# Patient Record
Sex: MALE | Race: OTHER | NOT HISPANIC OR LATINO | URBAN - METROPOLITAN AREA
[De-identification: names, ages, dates, MRNs, and addresses within clinical notes are randomized per-mention and may not be internally consistent; named-entity substitution may affect disease eponyms.]

---

## 2018-12-29 ENCOUNTER — INPATIENT (INPATIENT)
Facility: HOSPITAL | Age: 83
LOS: 3 days | Discharge: HOME | DRG: 392 | End: 2019-01-02
Attending: INTERNAL MEDICINE | Admitting: INTERNAL MEDICINE
Payer: COMMERCIAL

## 2018-12-29 VITALS
SYSTOLIC BLOOD PRESSURE: 124 MMHG | TEMPERATURE: 98 F | HEIGHT: 65 IN | DIASTOLIC BLOOD PRESSURE: 68 MMHG | WEIGHT: 171.96 LBS | RESPIRATION RATE: 20 BRPM | HEART RATE: 99 BPM

## 2018-12-29 DIAGNOSIS — K92.2 GASTROINTESTINAL HEMORRHAGE, UNSPECIFIED: ICD-10-CM

## 2018-12-29 DIAGNOSIS — Z29.9 ENCOUNTER FOR PROPHYLACTIC MEASURES, UNSPECIFIED: ICD-10-CM

## 2018-12-29 DIAGNOSIS — N17.9 ACUTE KIDNEY FAILURE, UNSPECIFIED: ICD-10-CM

## 2018-12-29 DIAGNOSIS — C61 MALIGNANT NEOPLASM OF PROSTATE: ICD-10-CM

## 2018-12-29 DIAGNOSIS — D64.9 ANEMIA, UNSPECIFIED: ICD-10-CM

## 2018-12-29 DIAGNOSIS — I10 ESSENTIAL (PRIMARY) HYPERTENSION: ICD-10-CM

## 2018-12-29 LAB
ALBUMIN SERPL ELPH-MCNC: 2.7 G/DL — LOW (ref 3.5–5)
ALP SERPL-CCNC: 52 U/L — SIGNIFICANT CHANGE UP (ref 40–120)
ALT FLD-CCNC: 24 U/L DA — SIGNIFICANT CHANGE UP (ref 10–60)
ANION GAP SERPL CALC-SCNC: 5 MMOL/L — SIGNIFICANT CHANGE UP (ref 5–17)
APTT BLD: 30.3 SEC — SIGNIFICANT CHANGE UP (ref 27.5–36.3)
AST SERPL-CCNC: 25 U/L — SIGNIFICANT CHANGE UP (ref 10–40)
BASOPHILS # BLD AUTO: 0.1 K/UL — SIGNIFICANT CHANGE UP (ref 0–0.2)
BASOPHILS # BLD AUTO: 0.1 K/UL — SIGNIFICANT CHANGE UP (ref 0–0.2)
BASOPHILS NFR BLD AUTO: 1.6 % — SIGNIFICANT CHANGE UP (ref 0–2)
BASOPHILS NFR BLD AUTO: 1.8 % — SIGNIFICANT CHANGE UP (ref 0–2)
BILIRUB SERPL-MCNC: 0.3 MG/DL — SIGNIFICANT CHANGE UP (ref 0.2–1.2)
BUN SERPL-MCNC: 26 MG/DL — HIGH (ref 7–18)
CALCIUM SERPL-MCNC: 7.8 MG/DL — LOW (ref 8.4–10.5)
CHLORIDE SERPL-SCNC: 108 MMOL/L — SIGNIFICANT CHANGE UP (ref 96–108)
CK MB BLD-MCNC: 0.7 % — SIGNIFICANT CHANGE UP (ref 0–3.5)
CK MB BLD-MCNC: 0.7 % — SIGNIFICANT CHANGE UP (ref 0–3.5)
CK MB CFR SERPL CALC: 3.5 NG/ML — SIGNIFICANT CHANGE UP (ref 0–3.6)
CK MB CFR SERPL CALC: 4.5 NG/ML — HIGH (ref 0–3.6)
CK SERPL-CCNC: 496 U/L — HIGH (ref 35–232)
CK SERPL-CCNC: 603 U/L — HIGH (ref 35–232)
CO2 SERPL-SCNC: 27 MMOL/L — SIGNIFICANT CHANGE UP (ref 22–31)
CREAT SERPL-MCNC: 1.41 MG/DL — HIGH (ref 0.5–1.3)
EOSINOPHIL # BLD AUTO: 0.1 K/UL — SIGNIFICANT CHANGE UP (ref 0–0.5)
EOSINOPHIL # BLD AUTO: 0.2 K/UL — SIGNIFICANT CHANGE UP (ref 0–0.5)
EOSINOPHIL NFR BLD AUTO: 1.8 % — SIGNIFICANT CHANGE UP (ref 0–6)
EOSINOPHIL NFR BLD AUTO: 3.6 % — SIGNIFICANT CHANGE UP (ref 0–6)
GLUCOSE SERPL-MCNC: 123 MG/DL — HIGH (ref 70–99)
HCT VFR BLD CALC: 27.8 % — LOW (ref 39–50)
HCT VFR BLD CALC: 29.9 % — LOW (ref 39–50)
HGB BLD-MCNC: 8.6 G/DL — LOW (ref 13–17)
HGB BLD-MCNC: 9.4 G/DL — LOW (ref 13–17)
INR BLD: 1.07 RATIO — SIGNIFICANT CHANGE UP (ref 0.88–1.16)
LYMPHOCYTES # BLD AUTO: 1.3 K/UL — SIGNIFICANT CHANGE UP (ref 1–3.3)
LYMPHOCYTES # BLD AUTO: 1.5 K/UL — SIGNIFICANT CHANGE UP (ref 1–3.3)
LYMPHOCYTES # BLD AUTO: 23.7 % — SIGNIFICANT CHANGE UP (ref 13–44)
LYMPHOCYTES # BLD AUTO: 29.5 % — SIGNIFICANT CHANGE UP (ref 13–44)
MCHC RBC-ENTMCNC: 30.8 GM/DL — LOW (ref 32–36)
MCHC RBC-ENTMCNC: 31.4 GM/DL — LOW (ref 32–36)
MCHC RBC-ENTMCNC: 31.5 PG — SIGNIFICANT CHANGE UP (ref 27–34)
MCHC RBC-ENTMCNC: 32 PG — SIGNIFICANT CHANGE UP (ref 27–34)
MCV RBC AUTO: 101.9 FL — HIGH (ref 80–100)
MCV RBC AUTO: 102.2 FL — HIGH (ref 80–100)
MONOCYTES # BLD AUTO: 0.8 K/UL — SIGNIFICANT CHANGE UP (ref 0–0.9)
MONOCYTES # BLD AUTO: 0.9 K/UL — SIGNIFICANT CHANGE UP (ref 0–0.9)
MONOCYTES NFR BLD AUTO: 15.4 % — HIGH (ref 2–14)
MONOCYTES NFR BLD AUTO: 16.3 % — HIGH (ref 2–14)
NEUTROPHILS # BLD AUTO: 2.5 K/UL — SIGNIFICANT CHANGE UP (ref 1.8–7.4)
NEUTROPHILS # BLD AUTO: 3.2 K/UL — SIGNIFICANT CHANGE UP (ref 1.8–7.4)
NEUTROPHILS NFR BLD AUTO: 49.5 % — SIGNIFICANT CHANGE UP (ref 43–77)
NEUTROPHILS NFR BLD AUTO: 56.6 % — SIGNIFICANT CHANGE UP (ref 43–77)
OB PNL STL: POSITIVE
PLATELET # BLD AUTO: 188 K/UL — SIGNIFICANT CHANGE UP (ref 150–400)
PLATELET # BLD AUTO: 188 K/UL — SIGNIFICANT CHANGE UP (ref 150–400)
POTASSIUM SERPL-MCNC: 5.2 MMOL/L — SIGNIFICANT CHANGE UP (ref 3.5–5.3)
POTASSIUM SERPL-SCNC: 5.2 MMOL/L — SIGNIFICANT CHANGE UP (ref 3.5–5.3)
PROT SERPL-MCNC: 6 G/DL — SIGNIFICANT CHANGE UP (ref 6–8.3)
PROTHROM AB SERPL-ACNC: 11.9 SEC — SIGNIFICANT CHANGE UP (ref 10–12.9)
RBC # BLD: 2.72 M/UL — LOW (ref 4.2–5.8)
RBC # BLD: 2.93 M/UL — LOW (ref 4.2–5.8)
RBC # FLD: 12.3 % — SIGNIFICANT CHANGE UP (ref 10.3–14.5)
RBC # FLD: 12.6 % — SIGNIFICANT CHANGE UP (ref 10.3–14.5)
SODIUM SERPL-SCNC: 140 MMOL/L — SIGNIFICANT CHANGE UP (ref 135–145)
TROPONIN I SERPL-MCNC: <0.015 NG/ML — SIGNIFICANT CHANGE UP (ref 0–0.04)
TROPONIN I SERPL-MCNC: <0.015 NG/ML — SIGNIFICANT CHANGE UP (ref 0–0.04)
WBC # BLD: 5.1 K/UL — SIGNIFICANT CHANGE UP (ref 3.8–10.5)
WBC # BLD: 5.6 K/UL — SIGNIFICANT CHANGE UP (ref 3.8–10.5)
WBC # FLD AUTO: 5.1 K/UL — SIGNIFICANT CHANGE UP (ref 3.8–10.5)
WBC # FLD AUTO: 5.6 K/UL — SIGNIFICANT CHANGE UP (ref 3.8–10.5)

## 2018-12-29 PROCEDURE — 99285 EMERGENCY DEPT VISIT HI MDM: CPT

## 2018-12-29 RX ORDER — PANTOPRAZOLE SODIUM 20 MG/1
40 TABLET, DELAYED RELEASE ORAL
Qty: 0 | Refills: 0 | Status: DISCONTINUED | OUTPATIENT
Start: 2018-12-29 | End: 2019-01-02

## 2018-12-29 RX ORDER — CEFTRIAXONE 500 MG/1
1 INJECTION, POWDER, FOR SOLUTION INTRAMUSCULAR; INTRAVENOUS ONCE
Qty: 0 | Refills: 0 | Status: DISCONTINUED | OUTPATIENT
Start: 2018-12-29 | End: 2018-12-29

## 2018-12-29 RX ORDER — PANTOPRAZOLE SODIUM 20 MG/1
40 TABLET, DELAYED RELEASE ORAL
Qty: 0 | Refills: 0 | Status: DISCONTINUED | OUTPATIENT
Start: 2018-12-29 | End: 2018-12-29

## 2018-12-29 RX ORDER — PANTOPRAZOLE SODIUM 20 MG/1
80 TABLET, DELAYED RELEASE ORAL ONCE
Qty: 0 | Refills: 0 | Status: COMPLETED | OUTPATIENT
Start: 2018-12-29 | End: 2018-12-29

## 2018-12-29 RX ADMIN — PANTOPRAZOLE SODIUM 40 MILLIGRAM(S): 20 TABLET, DELAYED RELEASE ORAL at 17:50

## 2018-12-29 RX ADMIN — PANTOPRAZOLE SODIUM 80 MILLIGRAM(S): 20 TABLET, DELAYED RELEASE ORAL at 12:36

## 2018-12-29 NOTE — H&P ADULT - NSHPPHYSICALEXAM_GEN_ALL_CORE
· Constitutional	Well-developed, well nourished  · Eyes	conjuctivae clear  · ENMT	No oral lesions; no gross abnormalities  · Neck	No bruits; no thyromegaly or nodules   · Back	No deformity or limitation of movement   · Respiratory	Breath Sounds equal & clear to percussion & auscultation, no accessory muscle use  · Cardiovascular	Regular rate & rhythm, normal S1, S2; no murmurs, gallops or rubs; no S3, S4  · Gastrointestinal	Soft, non-tender, no hepatosplenomegaly, normal bowel sounds  · Extremities	No cyanosis, clubbing or edema   · Neurological	Alert & oriented; no sensory, motor or coordination deficits, normal reflexes  · Musculoskeletal	No joint pain, swelling or deformity; no limitation of movement · Constitutional	Well-developed, well nourished  · Eyes	conjuctivae clear  · ENMT	No oral lesions; no gross abnormalities  · Neck	No bruits; no thyromegaly or nodules   · Back	No deformity or limitation of movement   · Respiratory	Breath Sounds equal & clear to percussion & auscultation, no accessory muscle use  · Cardiovascular	Regular rate & rhythm, normal S1, S2; no murmurs, gallops or rubs; no S3, S4  · Gastrointestinal	Soft, non-tender, no hepatosplenomegaly, normal bowel sounds  · Extremities	left leg - incision site form previous injury - clean site   · Neurological	Alert & oriented; no sensory, motor or coordination deficits, normal reflexes  · Musculoskeletal	No joint pain, swelling or deformity; no limitation of movement

## 2018-12-29 NOTE — ED ADULT NURSE NOTE - CHPI ED NUR SYMPTOMS NEG
no nausea/no dysuria/no fever/no hematuria/no abdominal distension/no burning urination/no chills/no diarrhea/no vomiting

## 2018-12-29 NOTE — H&P ADULT - PROBLEM SELECTOR PLAN 1
presented with blood BM for 2 days - 3-4 episodes   Hb level of 9.4 on admission - h/o anemia with unknown baseline  FOBT positive   maroon stool on rectal exam- no hemorrhoid noted   coags WNL, type and screen obtained, blood consent in chart   EKG- NSR @ 83 with no STT wave changes  GI Dr. Pillai presented with blood BM for 2 days - 3-4 episodes   Hb level of 9.4 on admission - h/o anemia with unknown baseline  FOBT positive   maroon stool on rectal exam- no hemorrhoid noted  orthostatic negative   coags WNL, type and screen obtained, blood consent in chart   EKG- NSR @ 83 with no STT wave changes with T1 negative   holding aspirin and  DVT prophylaxis   CBC q12  GI Dr. Pillai presented with blood BM for 2 days - 3-4 episodes   Hb level of 9.4 on admission - h/o anemia with unknown baseline  FOBT positive   maroon stool on rectal exam- no hemorrhoid noted  orthostatic negative   coags WNL, type and screen obtained, blood consent in chart   EKG- NSR @ 83 with no STT wave changes with T1 negative   holding aspirin and  DVT prophylaxis   will start on clear liquid diet as no signs of active bleeding   protonix daily   CBC q12  GI Dr. Pillai presented with blood BM for 2 days - 3-4 episodes   Hb level of 9.4 on admission - h/o anemia with unknown baseline  FOBT positive   maroon stool on rectal exam- no hemorrhoid noted  orthostatic negative   coags WNL, type and screen obtained, blood consent in chart   EKG- NSR @ 83 with no STT wave changes with T1 negative   holding aspirin and  DVT prophylaxis   will start on clear liquid diet as no signs of active bleeding   protonix daily   CBC q8  GI Dr. Pillai presented with blood BM for 2 days - 3-4 episodes   Hb level of 9.4 on admission - h/o anemia with unknown baseline  FOBT positive   maroon stool on rectal exam- no hemorrhoid noted  orthostatic negative   coags WNL, type and screen obtained, blood consent in chart   EKG- NSR @ 83 with no STT wave changes with T1 negative   holding aspirin and  DVT prophylaxis   will start on clear liquid diet as no signs of active bleeding   protonix 40 BID  CBC q8  GI Dr. Pillai presented with blood BM for 2 days - 3-4 episodes   Hb level of 9.4 on admission - h/o anemia with unknown baseline  FOBT positive   maroon stool on rectal exam- no hemorrhoid noted  orthostatic negative   coags WNL, type and screen obtained, blood consent in chart   EKG- NSR @ 83 with no STT wave changes with T1 negative   holding aspirin and  DVT prophylaxis   will start on clear liquid diet as no signs of active bleeding   protonix 40 BID  CBC q8  GI Dr. Woods

## 2018-12-29 NOTE — H&P ADULT - ASSESSMENT
89/M form home independent at baseline with PMH of prostate cancer, HTN and anemia (unknown baseline) presents to ED with bloody BM for last 2 days. Patient mentions that for last 2 dyas he had about 3-4 BM with tena blood noted with it. Denies any previous episode, abdominal pain, fever, dizziness or history of hemorrhoids in past. Denies nausea, vomiting, diarrhea, abdominal pain, SOB, chest pain, WATSON, palpitations, dizziness, headache, cough, wheezing, joint pain or swelling, fever, chills.       In ED:   vitals: 124/68, 99, 97.8, 20 (99)  labs pertinent for H/H of 9.4/29.9  coags WNL   FOBT positive 89/M form home independent at baseline with PMH of prostate cancer, HTN and anemia (unknown baseline) presents to ED with bloody BM for last 2 days. Patient mentions that for last 2 dyas he had about 3-4 BM with tena blood noted with it. Denies any previous episode, abdominal pain, fever, dizziness or history of hemorrhoids in past. Denies nausea, vomiting, diarrhea, abdominal pain, SOB, chest pain, WATSON, palpitations, dizziness, headache, cough, wheezing, joint pain or swelling, fever, chills.       Never had colonoscopy.     In ED:   vitals: 124/68, 99, 97.8, 20 (99)  labs pertinent for H/H of 9.4/29.9  coags WNL   FOBT positive

## 2018-12-29 NOTE — H&P ADULT - PROBLEM SELECTOR PLAN 2
unknown baseline   follow anemia panel, vitamin B12 and folate level   primary team to obtain records form PCP

## 2018-12-29 NOTE — H&P ADULT - PROBLEM SELECTOR PLAN 4
IMPROVE VTE Individual Risk Assessment          RISK                                                          Points  [  ] Previous VTE                                                3  [  ] Thrombophilia                                             2  [  ] Lower limb paralysis                                   2        (unable to hold up >15 seconds)    [  ] Current Cancer                                             2         (within 6 months)  [x  ] Immobilization > 24 hrs                              1  [  ] ICU/CCU stay > 24 hours                             1  [ x ] Age > 60                                                         1    IMPROVE VTE Score: 2  will hold chemical anticoagulation 2/2 concern for active bleed   SCD on lisinopril 5 mg at home   will hold in setting of bleeding and KOLBY

## 2018-12-29 NOTE — ED PROVIDER NOTE - PROGRESS NOTE DETAILS
coag normal h/h 9.4. normal plt.  no active bleeding, no abd pain  admit to med for GIB stable.  artemio fierro to see

## 2018-12-29 NOTE — ED ADULT NURSE NOTE - OBJECTIVE STATEMENT
Pt c/o dark stools sInce yesterday, denies N/V/D, denies pain, denies previous similar episodes in the past

## 2018-12-29 NOTE — H&P ADULT - NSHPLABSRESULTS_GEN_ALL_CORE
Vital Signs Last 24 Hrs  T(C): 36.6 (29 Dec 2018 10:44), Max: 36.6 (29 Dec 2018 10:44)  T(F): 97.8 (29 Dec 2018 10:44), Max: 97.8 (29 Dec 2018 10:44)  HR: 99 (29 Dec 2018 10:44) (99 - 99)  BP: 124/68 (29 Dec 2018 10:44) (124/68 - 124/68)  RR: 20 (29 Dec 2018 10:44) (20 - 20)                            9.4    5.6   )-----------( 188      ( 29 Dec 2018 12:18 )             29.9       12-29    140  |  108  |  26<H>  ----------------------------<  123<H>  5.2   |  27  |  1.41<H>    Ca    7.8<L>      29 Dec 2018 12:18    TPro  6.0  /  Alb  2.7<L>  /  TBili  0.3  /  DBili  x   /  AST  25  /  ALT  24  /  AlkPhos  52  12-29          PT/INR - ( 29 Dec 2018 12:18 )   PT: 11.9 sec;   INR: 1.07 ratio         PTT - ( 29 Dec 2018 12:18 )  PTT:30.3 sec

## 2018-12-29 NOTE — ED PROVIDER NOTE - OBJECTIVE STATEMENT
89 y/o M pt with a PMHx of Prostate Cancer and HTN and no significant PSHx presents to ED c/o dark stools x2 days. Pt states that is not on any blood thinners. Pt denies nausea, vomiting, abd pain, fevers, chills, or any other acute complaints. NKDA.

## 2018-12-29 NOTE — H&P ADULT - PROBLEM SELECTOR PLAN 5
IMPROVE VTE Individual Risk Assessment          RISK                                                          Points  [  ] Previous VTE                                                3  [  ] Thrombophilia                                             2  [  ] Lower limb paralysis                                   2        (unable to hold up >15 seconds)    [  ] Current Cancer                                             2         (within 6 months)  [x  ] Immobilization > 24 hrs                              1  [  ] ICU/CCU stay > 24 hours                             1  [ x ] Age > 60                                                         1    IMPROVE VTE Score: 2  will hold chemical anticoagulation 2/2 concern for active bleed   SCD

## 2018-12-29 NOTE — H&P ADULT - HISTORY OF PRESENT ILLNESS
89/M form home independent at baseline with PMH of prostate cancer, HTN and anemia presents to ED with bloody BM for last 2 days. Patient mentions that for last 2 dyas he had about 3-4 BM with tena blood noted with it. Denies any previous episode, abdominal pain, fever, dizziness or history of hemorrhoids in past. Denies nausea, vomiting, diarrhea, abdominal pain, SOB, chest pain, WATSON, palpitations, dizziness, headache, cough, wheezing, joint pain or swelling, fever, chills.

## 2018-12-30 LAB
ABO RH CONFIRMATION: SIGNIFICANT CHANGE UP
ANION GAP SERPL CALC-SCNC: 5 MMOL/L — SIGNIFICANT CHANGE UP (ref 5–17)
BASOPHILS # BLD AUTO: 0.1 K/UL — SIGNIFICANT CHANGE UP (ref 0–0.2)
BASOPHILS NFR BLD AUTO: 1.4 % — SIGNIFICANT CHANGE UP (ref 0–2)
BUN SERPL-MCNC: 20 MG/DL — HIGH (ref 7–18)
CALCIUM SERPL-MCNC: 7.6 MG/DL — LOW (ref 8.4–10.5)
CHLORIDE SERPL-SCNC: 111 MMOL/L — HIGH (ref 96–108)
CHOLEST SERPL-MCNC: 115 MG/DL — SIGNIFICANT CHANGE UP (ref 10–199)
CK MB BLD-MCNC: 0.7 % — SIGNIFICANT CHANGE UP (ref 0–3.5)
CK MB CFR SERPL CALC: 3.1 NG/ML — SIGNIFICANT CHANGE UP (ref 0–3.6)
CK SERPL-CCNC: 425 U/L — HIGH (ref 35–232)
CO2 SERPL-SCNC: 27 MMOL/L — SIGNIFICANT CHANGE UP (ref 22–31)
CREAT SERPL-MCNC: 1.09 MG/DL — SIGNIFICANT CHANGE UP (ref 0.5–1.3)
EOSINOPHIL # BLD AUTO: 0.2 K/UL — SIGNIFICANT CHANGE UP (ref 0–0.5)
EOSINOPHIL NFR BLD AUTO: 4.9 % — SIGNIFICANT CHANGE UP (ref 0–6)
FERRITIN SERPL-MCNC: 45 NG/ML — SIGNIFICANT CHANGE UP (ref 30–400)
FOLATE SERPL-MCNC: >20 NG/ML — SIGNIFICANT CHANGE UP
GLUCOSE SERPL-MCNC: 106 MG/DL — HIGH (ref 70–99)
HAPTOGLOB SERPL-MCNC: 105 MG/DL — SIGNIFICANT CHANGE UP (ref 34–200)
HCT VFR BLD CALC: 25.4 % — LOW (ref 39–50)
HCT VFR BLD CALC: 25.4 % — LOW (ref 39–50)
HCT VFR BLD CALC: 29.7 % — LOW (ref 39–50)
HDLC SERPL-MCNC: 39 MG/DL — LOW
HGB BLD-MCNC: 7.7 G/DL — LOW (ref 13–17)
HGB BLD-MCNC: 7.8 G/DL — LOW (ref 13–17)
HGB BLD-MCNC: 9.6 G/DL — LOW (ref 13–17)
IRON SATN MFR SERPL: 32 % — SIGNIFICANT CHANGE UP (ref 20–55)
IRON SATN MFR SERPL: 76 UG/DL — SIGNIFICANT CHANGE UP (ref 65–170)
LDH SERPL L TO P-CCNC: 253 U/L — HIGH (ref 120–225)
LIPID PNL WITH DIRECT LDL SERPL: 59 MG/DL — SIGNIFICANT CHANGE UP
LYMPHOCYTES # BLD AUTO: 1.2 K/UL — SIGNIFICANT CHANGE UP (ref 1–3.3)
LYMPHOCYTES # BLD AUTO: 26.4 % — SIGNIFICANT CHANGE UP (ref 13–44)
MAGNESIUM SERPL-MCNC: 2.2 MG/DL — SIGNIFICANT CHANGE UP (ref 1.6–2.6)
MCHC RBC-ENTMCNC: 30.5 GM/DL — LOW (ref 32–36)
MCHC RBC-ENTMCNC: 30.8 GM/DL — LOW (ref 32–36)
MCHC RBC-ENTMCNC: 30.9 PG — SIGNIFICANT CHANGE UP (ref 27–34)
MCHC RBC-ENTMCNC: 31.2 PG — SIGNIFICANT CHANGE UP (ref 27–34)
MCHC RBC-ENTMCNC: 32 PG — SIGNIFICANT CHANGE UP (ref 27–34)
MCHC RBC-ENTMCNC: 32.4 GM/DL — SIGNIFICANT CHANGE UP (ref 32–36)
MCV RBC AUTO: 100.5 FL — HIGH (ref 80–100)
MCV RBC AUTO: 102.5 FL — HIGH (ref 80–100)
MCV RBC AUTO: 98.9 FL — SIGNIFICANT CHANGE UP (ref 80–100)
MONOCYTES # BLD AUTO: 0.6 K/UL — SIGNIFICANT CHANGE UP (ref 0–0.9)
MONOCYTES NFR BLD AUTO: 13.7 % — SIGNIFICANT CHANGE UP (ref 2–14)
NEUTROPHILS # BLD AUTO: 2.4 K/UL — SIGNIFICANT CHANGE UP (ref 1.8–7.4)
NEUTROPHILS NFR BLD AUTO: 53.7 % — SIGNIFICANT CHANGE UP (ref 43–77)
PHOSPHATE SERPL-MCNC: 3.4 MG/DL — SIGNIFICANT CHANGE UP (ref 2.5–4.5)
PLATELET # BLD AUTO: 146 K/UL — LOW (ref 150–400)
PLATELET # BLD AUTO: 179 K/UL — SIGNIFICANT CHANGE UP (ref 150–400)
PLATELET # BLD AUTO: 187 K/UL — SIGNIFICANT CHANGE UP (ref 150–400)
POTASSIUM SERPL-MCNC: 5.2 MMOL/L — SIGNIFICANT CHANGE UP (ref 3.5–5.3)
POTASSIUM SERPL-SCNC: 5.2 MMOL/L — SIGNIFICANT CHANGE UP (ref 3.5–5.3)
RBC # BLD: 2.48 M/UL — LOW (ref 4.2–5.8)
RBC # BLD: 2.52 M/UL — LOW (ref 4.2–5.8)
RBC # BLD: 3.01 M/UL — LOW (ref 4.2–5.8)
RBC # FLD: 12.1 % — SIGNIFICANT CHANGE UP (ref 10.3–14.5)
RBC # FLD: 12.7 % — SIGNIFICANT CHANGE UP (ref 10.3–14.5)
RBC # FLD: 15 % — HIGH (ref 10.3–14.5)
SODIUM SERPL-SCNC: 143 MMOL/L — SIGNIFICANT CHANGE UP (ref 135–145)
TIBC SERPL-MCNC: 240 UG/DL — LOW (ref 250–450)
TOTAL CHOLESTEROL/HDL RATIO MEASUREMENT: 2.9 RATIO — LOW (ref 3.4–9.6)
TRIGL SERPL-MCNC: 83 MG/DL — SIGNIFICANT CHANGE UP (ref 10–149)
TROPONIN I SERPL-MCNC: <0.015 NG/ML — SIGNIFICANT CHANGE UP (ref 0–0.04)
TSH SERPL-MCNC: 1.21 UU/ML — SIGNIFICANT CHANGE UP (ref 0.34–4.82)
UIBC SERPL-MCNC: 164 UG/DL — SIGNIFICANT CHANGE UP (ref 110–370)
VIT B12 SERPL-MCNC: 307 PG/ML — SIGNIFICANT CHANGE UP (ref 232–1245)
WBC # BLD: 4.4 K/UL — SIGNIFICANT CHANGE UP (ref 3.8–10.5)
WBC # BLD: 5.5 K/UL — SIGNIFICANT CHANGE UP (ref 3.8–10.5)
WBC # BLD: 7.9 K/UL — SIGNIFICANT CHANGE UP (ref 3.8–10.5)
WBC # FLD AUTO: 4.4 K/UL — SIGNIFICANT CHANGE UP (ref 3.8–10.5)
WBC # FLD AUTO: 5.5 K/UL — SIGNIFICANT CHANGE UP (ref 3.8–10.5)
WBC # FLD AUTO: 7.9 K/UL — SIGNIFICANT CHANGE UP (ref 3.8–10.5)

## 2018-12-30 PROCEDURE — 74177 CT ABD & PELVIS W/CONTRAST: CPT | Mod: 26

## 2018-12-30 PROCEDURE — 99223 1ST HOSP IP/OBS HIGH 75: CPT

## 2018-12-30 RX ORDER — SOD SULF/SODIUM/NAHCO3/KCL/PEG
4000 SOLUTION, RECONSTITUTED, ORAL ORAL ONCE
Qty: 0 | Refills: 0 | Status: COMPLETED | OUTPATIENT
Start: 2018-12-30 | End: 2018-12-30

## 2018-12-30 RX ADMIN — PANTOPRAZOLE SODIUM 40 MILLIGRAM(S): 20 TABLET, DELAYED RELEASE ORAL at 17:26

## 2018-12-30 RX ADMIN — Medication 4000 MILLILITER(S): at 17:26

## 2018-12-30 RX ADMIN — PANTOPRAZOLE SODIUM 40 MILLIGRAM(S): 20 TABLET, DELAYED RELEASE ORAL at 06:07

## 2018-12-30 NOTE — CONSULT NOTE ADULT - ASSESSMENT
Continues to have rectal bleeding.   Plan for Colonoscopy tomorrow  Clears today   4 liters Gl   NPO post midnight

## 2018-12-30 NOTE — CHART NOTE - NSCHARTNOTEFT_GEN_A_CORE
Pt had total of 3 bloody bowel movments over the day and got 1 unit of prbc, post transfusion hb was 9.6, will follow cbc am

## 2018-12-30 NOTE — CONSULT NOTE ADULT - SUBJECTIVE AND OBJECTIVE BOX
Patient is a 89y old  Male who presents with a chief complaint of Dark stool for 2 days (30 Dec 2018 09:22)    HPI: 89y Male  presents with a chief complaint of         . The patient has a significant past medical history of           .     REVIEW OF SYSTEMS  Constitutional:   No fever, no fatigue, no pallor, no night sweats, no weight loss.  HEENT:   No eye pain, no vision changes, no icterus, no mouth ulcers.  Respiratory:   No shortness of breath, no cough, no respiratory distress.   Cardiovascular:   No chest pain, no palpitations.   Gastrointestinal: No abdominal pain, no nausea, no vomiting , no diahrrea, no constipation, no hematochezia,no melena.  Skin:   No rashes, no jaundice, no eczema.   Musculoskeletal:   No joint pain, no swelling, no myalgia.   Neurologic:   No headache, no seizure, no weakness.   Genitourinary:   No dysuria, no decreased urine output.  Psychiatric:  No depression, no anxiety,   Endocrine:   No thyroid disease, no diabetes.  Heme/Lymphatic:   No anemia, no blood transfusions, no lymph node enlargement, no bleeding, no bruising.  ___________________________________________________________________________________________  Allergies    No Known Allergies    Intolerances      MEDICATIONS  (STANDING):  pantoprazole  Injectable 40 milliGRAM(s) IV Push two times a day    MEDICATIONS  (PRN):      PAST MEDICAL & SURGICAL HISTORY:  Prostate cancer  Hypertension  No significant past surgical history    FAMILY HISTORY:  No pertinent family history in first degree relatives    Social History: No hsitory of : Tobacco use, IVDA, EToH  ______________________________________________________________________________________    PHYSICAL EXAM    Daily     Daily Weight in k.4 (30 Dec 2018 05:13)  BMI: 28.6 (12-29 @ 10:44)  Change in Weight:  Vital Signs Last 24 Hrs  T(C): 36.7 (30 Dec 2018 10:28), Max: 36.9 (30 Dec 2018 05:13)  T(F): 98.1 (30 Dec 2018 10:28), Max: 98.4 (30 Dec 2018 05:13)  HR: 83 (30 Dec 2018 10:) (80 - 90)  BP: 108/56 (30 Dec 2018 10:) (103/70 - 113/51)  BP(mean): --  RR: 18 (30 Dec 2018 10:28) (17 - 19)  SpO2: 100% (30 Dec 2018 10:) (98% - 100%)    General:  Well developed, well nourished, alert and active, no pallor, NAD.  HEENT:    Normal appearance of conjunctiva, ears, nose, lips, oropharynx, and oral mucosa, anicteric.  Neck:  No masses, no asymmetry.  Lymph Nodes:  No lymphadenopathy.   Cardiovascular:  RRR normal S1/S2, no murmur.  Respiratory:  CTA B/L, normal respiratory effort.   Abdominal:   soft, no masses or tenderness, normoactive BS, NT/ND, no HSM.  Extremities:   No clubbing or cyanosis, normal capillary refill, no edema.   Skin:   No rash, jaundice, lesions, eczema.   Musculoskeletal:  No joint swelling, erythema or tenderness.   Neuro: No focal deficits.   Other:   _______________________________________________________________________________________________  Lab Results:                          7.8    4.4   )-----------( 179      ( 30 Dec 2018 07:34 )             25.4     12-30    143  |  111<H>  |  20<H>  ----------------------------<  106<H>  5.2   |  27  |  1.09    Ca    7.6<L>      30 Dec 2018 07:34  Phos  3.4       Mg     2.2         TPro  6.0  /  Alb  2.7<L>  /  TBili  0.3  /  DBili  x   /  AST  25  /  ALT  24  /  AlkPhos  52      LIVER FUNCTIONS - ( 29 Dec 2018 12:18 )  Alb: 2.7 g/dL / Pro: 6.0 g/dL / ALK PHOS: 52 U/L / ALT: 24 U/L DA / AST: 25 U/L / GGT: x           PT/INR - ( 29 Dec 2018 12:18 )   PT: 11.9 sec;   INR: 1.07 ratio         PTT - ( 29 Dec 2018 12:18 )  PTT:30.3 sec  Triglycerides, Serum: 83 mg/dL ( @ 07:34)    CARDIAC MARKERS ( 30 Dec 2018 07:34 )  <0.015 ng/mL / x     / 425 U/L / x     / 3.1 ng/mL  CARDIAC MARKERS ( 29 Dec 2018 22:41 )  <0.015 ng/mL / x     / 496 U/L / x     / 3.5 ng/mL  CARDIAC MARKERS ( 29 Dec 2018 14:54 )  <0.015 ng/mL / x     / 603 U/L / x     / 4.5 ng/mL      Stool Results:          RADIOLOGY RESULTS:    SURGICAL PATHOLOGY:

## 2018-12-30 NOTE — PROGRESS NOTE ADULT - SUBJECTIVE AND OBJECTIVE BOX
Patient is a 89y old  Male who presents with a chief complaint of Dark stool for 2 days (29 Dec 2018 14:48)    pt seen in icu [  ], reg med floor [ x  ], bed [ x ], chair at bedside [   ], a+o x3 [x], lethargic [  ],  nad [ x ]    no c/o abd pain, n/v, dizziness or sob        Allergies    No Known Allergies        Vitals    T(F): 98.4 (12-30-18 @ 05:13), Max: 98.4 (12-30-18 @ 05:13)  HR: 80 (12-30-18 @ 05:13) (80 - 99)  BP: 106/57 (12-30-18 @ 05:13) (103/70 - 124/68)  RR: 19 (12-30-18 @ 05:13) (17 - 20)  SpO2: 100% (12-30-18 @ 05:13) (98% - 100%)  Wt(kg): --  CAPILLARY BLOOD GLUCOSE          Labs                          7.8    4.4   )-----------( 179      ( 30 Dec 2018 07:34 )             25.4       12-30    143  |  111<H>  |  20<H>  ----------------------------<  106<H>  5.2   |  27  |  1.09    Ca    7.6<L>      30 Dec 2018 07:34  Phos  3.4     12-30  Mg     2.2     12-30    TPro  6.0  /  Alb  2.7<L>  /  TBili  0.3  /  DBili  x   /  AST  25  /  ALT  24  /  AlkPhos  52  12-29      CARDIAC MARKERS ( 30 Dec 2018 07:34 )  <0.015 ng/mL / x     / x     / x     / x      CARDIAC MARKERS ( 29 Dec 2018 22:41 )  <0.015 ng/mL / x     / 496 U/L / x     / 3.5 ng/mL  CARDIAC MARKERS ( 29 Dec 2018 14:54 )  <0.015 ng/mL / x     / 603 U/L / x     / 4.5 ng/mL        Radiology Results            Meds    MEDICATIONS  (STANDING):  pantoprazole  Injectable 40 milliGRAM(s) IV Push two times a day      MEDICATIONS  (PRN):      Physical Exam    Neuro :  no focal deficits  Respiratory: CTA B/L  CV: RRR, S1S2, no murmurs,   Abdominal: Soft, NT, ND +BS,  Extremities: No edema, + peripheral pulses    ASSESSMENT    Gastrointestinal hemorrhage  r/o pud  r/o diverticular bleed  r/o avm's  anemia 2nd to acute blood 2nd to gi bleed  s/p justin  h/o Prostate cancer  Hypertension  No significant past surgical history      PLAN    cont to monitor cbc  cont protonix  transfuse prbc for hemoglobin 7 or less  gi cons  f/u ct abd/pelv with contrast  cont current meds Patient is a 89y old  Male who presents with a chief complaint of Dark stool for 2 days (29 Dec 2018 14:48)    pt seen in icu [  ], reg med floor [ x  ], bed [ x ], chair at bedside [   ], a+o x3 [x], lethargic [  ],  nad [ x ]    no c/o abd pain, n/v, dizziness or sob        Allergies    No Known Allergies        Vitals    T(F): 98.4 (12-30-18 @ 05:13), Max: 98.4 (12-30-18 @ 05:13)  HR: 80 (12-30-18 @ 05:13) (80 - 99)  BP: 106/57 (12-30-18 @ 05:13) (103/70 - 124/68)  RR: 19 (12-30-18 @ 05:13) (17 - 20)  SpO2: 100% (12-30-18 @ 05:13) (98% - 100%)  Wt(kg): --  CAPILLARY BLOOD GLUCOSE          Labs                          7.8    4.4   )-----------( 179      ( 30 Dec 2018 07:34 )             25.4       12-30    143  |  111<H>  |  20<H>  ----------------------------<  106<H>  5.2   |  27  |  1.09    Ca    7.6<L>      30 Dec 2018 07:34  Phos  3.4     12-30  Mg     2.2     12-30    TPro  6.0  /  Alb  2.7<L>  /  TBili  0.3  /  DBili  x   /  AST  25  /  ALT  24  /  AlkPhos  52  12-29      CARDIAC MARKERS ( 30 Dec 2018 07:34 )  <0.015 ng/mL / x     / x     / x     / x      CARDIAC MARKERS ( 29 Dec 2018 22:41 )  <0.015 ng/mL / x     / 496 U/L / x     / 3.5 ng/mL  CARDIAC MARKERS ( 29 Dec 2018 14:54 )  <0.015 ng/mL / x     / 603 U/L / x     / 4.5 ng/mL        Radiology Results            Meds    MEDICATIONS  (STANDING):  pantoprazole  Injectable 40 milliGRAM(s) IV Push two times a day      MEDICATIONS  (PRN):      Physical Exam    Neuro :  no focal deficits  Respiratory: CTA B/L  CV: RRR, S1S2, no murmurs,   Abdominal: Soft, NT, ND +BS,  Extremities: No edema, + peripheral pulses    ASSESSMENT    Gastrointestinal hemorrhage  r/o pud  r/o diverticular bleed  r/o avm's  anemia 2nd to acute blood 2nd to gi bleed  s/p justin  h/o Prostate cancer  Hypertension  No significant past surgical history      PLAN    cont to monitor cbc  cont protonix  transfuse prbc for hemoglobin 7 or less  gi cons  f/u ct abd/pelv with contrast  icu cons if pt continues to bleed  cont current meds

## 2018-12-31 LAB
24R-OH-CALCIDIOL SERPL-MCNC: 13.7 NG/ML — LOW (ref 30–80)
ANION GAP SERPL CALC-SCNC: 7 MMOL/L — SIGNIFICANT CHANGE UP (ref 5–17)
BASOPHILS # BLD AUTO: 0.1 K/UL — SIGNIFICANT CHANGE UP (ref 0–0.2)
BASOPHILS NFR BLD AUTO: 1.1 % — SIGNIFICANT CHANGE UP (ref 0–2)
BUN SERPL-MCNC: 15 MG/DL — SIGNIFICANT CHANGE UP (ref 7–18)
CALCIUM SERPL-MCNC: 7.7 MG/DL — LOW (ref 8.4–10.5)
CHLORIDE SERPL-SCNC: 106 MMOL/L — SIGNIFICANT CHANGE UP (ref 96–108)
CO2 SERPL-SCNC: 28 MMOL/L — SIGNIFICANT CHANGE UP (ref 22–31)
CREAT SERPL-MCNC: 1.06 MG/DL — SIGNIFICANT CHANGE UP (ref 0.5–1.3)
EOSINOPHIL # BLD AUTO: 0.2 K/UL — SIGNIFICANT CHANGE UP (ref 0–0.5)
EOSINOPHIL NFR BLD AUTO: 2.4 % — SIGNIFICANT CHANGE UP (ref 0–6)
GLUCOSE SERPL-MCNC: 123 MG/DL — HIGH (ref 70–99)
HBA1C BLD-MCNC: 5.7 % — HIGH (ref 4–5.6)
HCT VFR BLD CALC: 25.2 % — LOW (ref 39–50)
HCT VFR BLD CALC: 27.1 % — LOW (ref 39–50)
HGB BLD-MCNC: 8.1 G/DL — LOW (ref 13–17)
HGB BLD-MCNC: 8.9 G/DL — LOW (ref 13–17)
INR BLD: 1.07 RATIO — SIGNIFICANT CHANGE UP (ref 0.88–1.16)
LYMPHOCYTES # BLD AUTO: 1.6 K/UL — SIGNIFICANT CHANGE UP (ref 1–3.3)
LYMPHOCYTES # BLD AUTO: 19 % — SIGNIFICANT CHANGE UP (ref 13–44)
MCHC RBC-ENTMCNC: 31.6 PG — SIGNIFICANT CHANGE UP (ref 27–34)
MCHC RBC-ENTMCNC: 31.8 PG — SIGNIFICANT CHANGE UP (ref 27–34)
MCHC RBC-ENTMCNC: 32 GM/DL — SIGNIFICANT CHANGE UP (ref 32–36)
MCHC RBC-ENTMCNC: 32.8 GM/DL — SIGNIFICANT CHANGE UP (ref 32–36)
MCV RBC AUTO: 96.8 FL — SIGNIFICANT CHANGE UP (ref 80–100)
MCV RBC AUTO: 98.9 FL — SIGNIFICANT CHANGE UP (ref 80–100)
MONOCYTES # BLD AUTO: 1 K/UL — HIGH (ref 0–0.9)
MONOCYTES NFR BLD AUTO: 11.6 % — SIGNIFICANT CHANGE UP (ref 2–14)
NEUTROPHILS # BLD AUTO: 5.5 K/UL — SIGNIFICANT CHANGE UP (ref 1.8–7.4)
NEUTROPHILS NFR BLD AUTO: 65.9 % — SIGNIFICANT CHANGE UP (ref 43–77)
PLATELET # BLD AUTO: 187 K/UL — SIGNIFICANT CHANGE UP (ref 150–400)
PLATELET # BLD AUTO: 202 K/UL — SIGNIFICANT CHANGE UP (ref 150–400)
POTASSIUM SERPL-MCNC: 4.2 MMOL/L — SIGNIFICANT CHANGE UP (ref 3.5–5.3)
POTASSIUM SERPL-SCNC: 4.2 MMOL/L — SIGNIFICANT CHANGE UP (ref 3.5–5.3)
PROTHROM AB SERPL-ACNC: 11.9 SEC — SIGNIFICANT CHANGE UP (ref 10–12.9)
RBC # BLD: 2.55 M/UL — LOW (ref 4.2–5.8)
RBC # BLD: 2.8 M/UL — LOW (ref 4.2–5.8)
RBC # FLD: 15 % — HIGH (ref 10.3–14.5)
RBC # FLD: 15.1 % — HIGH (ref 10.3–14.5)
SODIUM SERPL-SCNC: 141 MMOL/L — SIGNIFICANT CHANGE UP (ref 135–145)
TRANSFERRIN SERPL-MCNC: 165 MG/DL — LOW (ref 200–360)
WBC # BLD: 8.4 K/UL — SIGNIFICANT CHANGE UP (ref 3.8–10.5)
WBC # BLD: 8.4 K/UL — SIGNIFICANT CHANGE UP (ref 3.8–10.5)
WBC # FLD AUTO: 8.4 K/UL — SIGNIFICANT CHANGE UP (ref 3.8–10.5)
WBC # FLD AUTO: 8.4 K/UL — SIGNIFICANT CHANGE UP (ref 3.8–10.5)

## 2018-12-31 PROCEDURE — 45378 DIAGNOSTIC COLONOSCOPY: CPT

## 2018-12-31 RX ORDER — SENNA PLUS 8.6 MG/1
2 TABLET ORAL AT BEDTIME
Qty: 0 | Refills: 0 | Status: DISCONTINUED | OUTPATIENT
Start: 2018-12-31 | End: 2019-01-02

## 2018-12-31 RX ORDER — SODIUM CHLORIDE 9 MG/ML
1000 INJECTION, SOLUTION INTRAVENOUS
Qty: 0 | Refills: 0 | Status: DISCONTINUED | OUTPATIENT
Start: 2018-12-31 | End: 2018-12-31

## 2018-12-31 RX ORDER — DOCUSATE SODIUM 100 MG
100 CAPSULE ORAL DAILY
Qty: 0 | Refills: 0 | Status: DISCONTINUED | OUTPATIENT
Start: 2018-12-31 | End: 2019-01-02

## 2018-12-31 RX ADMIN — PANTOPRAZOLE SODIUM 40 MILLIGRAM(S): 20 TABLET, DELAYED RELEASE ORAL at 17:17

## 2018-12-31 RX ADMIN — SENNA PLUS 2 TABLET(S): 8.6 TABLET ORAL at 22:02

## 2018-12-31 RX ADMIN — PANTOPRAZOLE SODIUM 40 MILLIGRAM(S): 20 TABLET, DELAYED RELEASE ORAL at 06:06
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
## 2018-12-31 NOTE — PHYSICAL THERAPY INITIAL EVALUATION ADULT - CRITERIA FOR SKILLED THERAPEUTIC INTERVENTIONS
Pt, present with above noted chronic impairments and is independent in fucntional mobility/activities at his long term baseline status. He is independent in HEP. Therefore, skilled, therapeutic PT intervention is not indicated at this time.

## 2018-12-31 NOTE — PROGRESS NOTE ADULT - SUBJECTIVE AND OBJECTIVE BOX
Patient is a 89y old  Male who presents with a chief complaint of Dark stool for 2 days (30 Dec 2018 10:43)    pt seen in icu [  ], reg med floor [ x  ], bed [ x ], chair at bedside [   ], a+o x3 [x], lethargic [  ],  nad [ x ]    no c/o abd pain, n/v, dizziness or sob      Allergies    No Known Allergies        Vitals    T(F): 98.5 (12-31-18 @ 05:10), Max: 98.5 (12-31-18 @ 05:10)  HR: 84 (12-31-18 @ 05:10) (83 - 89)  BP: 105/57 (12-31-18 @ 05:10) (105/57 - 127/60)  RR: 16 (12-31-18 @ 05:10) (16 - 18)  SpO2: 98% (12-31-18 @ 05:10) (98% - 100%)  Wt(kg): --  CAPILLARY BLOOD GLUCOSE          Labs                          8.1    8.4   )-----------( 187      ( 31 Dec 2018 06:39 )             25.2       12-31    141  |  106  |  15  ----------------------------<  123<H>  4.2   |  28  |  1.06    Ca    7.7<L>      31 Dec 2018 06:39  Phos  3.4     12-30  Mg     2.2     12-30    TPro  6.0  /  Alb  2.7<L>  /  TBili  0.3  /  DBili  x   /  AST  25  /  ALT  24  /  AlkPhos  52  12-29      CARDIAC MARKERS ( 30 Dec 2018 07:34 )  <0.015 ng/mL / x     / 425 U/L / x     / 3.1 ng/mL  CARDIAC MARKERS ( 29 Dec 2018 22:41 )  <0.015 ng/mL / x     / 496 U/L / x     / 3.5 ng/mL  CARDIAC MARKERS ( 29 Dec 2018 14:54 )  <0.015 ng/mL / x     / 603 U/L / x     / 4.5 ng/mL            Radiology Results      < from: CT Abdomen and Pelvis w/ IV Cont (12.30.18 @ 10:18) >    Impression:   -No acute abdominal/pelvic pathology.  -Moderate proximal sigmoid diverticulosis.   -Multilevel vertebral body endplate sclerotic changes likely due to   degenerative disease rather than blastic metastasis; correlation with   bone scan can be obtained if clinically warranted.    < end of copied text >        Meds    MEDICATIONS  (STANDING):  dextrose 5% + sodium chloride 0.45%. 1000 milliLiter(s) (70 mL/Hr) IV Continuous <Continuous>  pantoprazole  Injectable 40 milliGRAM(s) IV Push two times a day      MEDICATIONS  (PRN):      Physical Exam      Neuro :  no focal deficits  Respiratory: CTA B/L  CV: RRR, S1S2, no murmurs,   Abdominal: Soft, NT, ND +BS,  Extremities: No edema, + peripheral pulses    ASSESSMENT    Gastrointestinal hemorrhage  r/o pud  r/o diverticular bleed  r/o avm's  anemia 2nd to acute blood 2nd to gi bleed  s/p justin  h/o Prostate cancer  Hypertension  No significant past surgical history      PLAN    cont to monitor cbc  cont protonix  pt s/p transfusion 1 prbc   hemoglobin still trending down  ct abd/pelv with -No acute abdominal/pelvic pathology. Moderate proximal sigmoid diverticulosis noted above.  gi f/u pt for colonoscopy today  icu cons if pt continues to bleed  cont current meds

## 2018-12-31 NOTE — PROGRESS NOTE ADULT - PROBLEM SELECTOR PLAN 1
p/w blood BM for 2 days - 3-4 episodes   Hb level of 9.4 on admission - h/o anemia with unknown baseline  FOBT positive   maroon stool on rectal exam- no ext. hemorrhoid noted  orthostatic negative   holding aspirin and  DVT prophylaxis   colonoscopy showed diverticulosis and int. hemorrhoid, no active bleeding  on protonix 40 BID, stool softner and high fiber diet  on regular diet  GI Dr. Woods

## 2018-12-31 NOTE — PROGRESS NOTE ADULT - SUBJECTIVE AND OBJECTIVE BOX
PGY 1 Note discussed with supervising resident and primary attending    Patient is a 89y old  Male who presents with a chief complaint of Dark stool for 2 days (31 Dec 2018 09:14)      INTERVAL HPI/OVERNIGHT EVENTS: had 4 episodes of bloody bowel movement overnight, likely due to Golytely, colonoscopy showed diverticuosis, int. hemorrhoid, no active bleeding, advanced diet and added stool softners    MEDICATIONS  (STANDING):  dextrose 5% + sodium chloride 0.9%. 1000 milliLiter(s) (75 mL/Hr) IV Continuous <Continuous>  docusate sodium 100 milliGRAM(s) Oral daily  pantoprazole  Injectable 40 milliGRAM(s) IV Push two times a day  senna 2 Tablet(s) Oral at bedtime    MEDICATIONS  (PRN):      __________________________________________________  REVIEW OF SYSTEMS:    CONSTITUTIONAL: No fever,   EYES: no acute visual disturbances  NECK: No pain or stiffness  RESPIRATORY: No cough; No shortness of breath  CARDIOVASCULAR: No chest pain, no palpitations  GASTROINTESTINAL: No pain. No nausea or vomiting; No diarrhea   NEUROLOGICAL: No headache or numbness, no tremors  MUSCULOSKELETAL: No joint pain, no muscle pain  GENITOURINARY: no dysuria, no frequency, no hesitancy  PSYCHIATRY: no depression , no anxiety  ALL OTHER  ROS negative        Vital Signs Last 24 Hrs  T(C): 36.9 (31 Dec 2018 05:10), Max: 36.9 (31 Dec 2018 05:10)  T(F): 98.5 (31 Dec 2018 05:10), Max: 98.5 (31 Dec 2018 05:10)  HR: 114 (31 Dec 2018 10:00) (84 - 114)  BP: 137/70 (31 Dec 2018 10:00) (105/57 - 137/70)  BP(mean): --  RR: 16 (31 Dec 2018 05:10) (16 - 18)  SpO2: 100% (31 Dec 2018 10:00) (98% - 100%)    ________________________________________________  PHYSICAL EXAM:  GENERAL: NAD  HEENT: Normocephalic;  conjunctivae and sclerae clear; moist mucous membranes;   NECK : supple  CHEST/LUNG: Clear to auscultation bilaterally with good air entry   HEART: S1 S2  regular; no murmurs, gallops or rubs  ABDOMEN: Soft, Nontender, Nondistended; Bowel sounds present  EXTREMITIES: no cyanosis; no edema; no calf tenderness  SKIN: warm and dry; no rash  NERVOUS SYSTEM:  Awake and alert; Oriented  to place, person and time ; no new deficits    _________________________________________________  LABS:                        8.1    8.4   )-----------( 187      ( 31 Dec 2018 06:39 )             25.2     12-31    141  |  106  |  15  ----------------------------<  123<H>  4.2   |  28  |  1.06    Ca    7.7<L>      31 Dec 2018 06:39  Phos  3.4     12-30  Mg     2.2     12-30      PT/INR - ( 31 Dec 2018 06:39 )   PT: 11.9 sec;   INR: 1.07 ratio             CAPILLARY BLOOD GLUCOSE            RADIOLOGY & ADDITIONAL TESTS:    Imaging Personally Reviewed:  YES    Consultant(s) Notes Reviewed:   YES    Care Discussed with Consultants : YES    Plan of care was discussed with patient and /or primary care giver; all questions and concerns were addressed and care was aligned with patient's wishes.
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
## 2018-12-31 NOTE — PROGRESS NOTE ADULT - ASSESSMENT
89/M form home independent at baseline with PMH of prostate cancer, HTN and anemia (unknown baseline) presents to ED with bloody BM for last 2 days. Patient mentions that for last 2 dyas he had about 3-4 BM with tena blood noted with it. Denies any previous episode, abdominal pain, fever, dizziness or history of hemorrhoids in past. Denies nausea, vomiting, diarrhea, abdominal pain, SOB, chest pain, WATSON, palpitations, dizziness, headache, cough, wheezing, joint pain or swelling, fever, chills.       Never had colonoscopy.     In ED:   vitals: 124/68, 99, 97.8, 20 (99)  labs pertinent for H/H of 9.4/29.9  coags WNL   FOBT positive

## 2019-01-01 LAB
ANION GAP SERPL CALC-SCNC: 8 MMOL/L — SIGNIFICANT CHANGE UP (ref 5–17)
BUN SERPL-MCNC: 12 MG/DL — SIGNIFICANT CHANGE UP (ref 7–18)
CALCIUM SERPL-MCNC: 7.5 MG/DL — LOW (ref 8.4–10.5)
CHLORIDE SERPL-SCNC: 106 MMOL/L — SIGNIFICANT CHANGE UP (ref 96–108)
CO2 SERPL-SCNC: 26 MMOL/L — SIGNIFICANT CHANGE UP (ref 22–31)
CREAT SERPL-MCNC: 1.09 MG/DL — SIGNIFICANT CHANGE UP (ref 0.5–1.3)
GLUCOSE SERPL-MCNC: 114 MG/DL — HIGH (ref 70–99)
HCT VFR BLD CALC: 24 % — LOW (ref 39–50)
HCT VFR BLD CALC: 25.7 % — LOW (ref 39–50)
HCT VFR BLD CALC: 28.2 % — LOW (ref 39–50)
HGB BLD-MCNC: 7.4 G/DL — LOW (ref 13–17)
HGB BLD-MCNC: 8.1 G/DL — LOW (ref 13–17)
HGB BLD-MCNC: 9.4 G/DL — LOW (ref 13–17)
MCHC RBC-ENTMCNC: 30.7 PG — SIGNIFICANT CHANGE UP (ref 27–34)
MCHC RBC-ENTMCNC: 30.8 GM/DL — LOW (ref 32–36)
MCHC RBC-ENTMCNC: 30.8 PG — SIGNIFICANT CHANGE UP (ref 27–34)
MCHC RBC-ENTMCNC: 31.6 GM/DL — LOW (ref 32–36)
MCHC RBC-ENTMCNC: 31.7 PG — SIGNIFICANT CHANGE UP (ref 27–34)
MCHC RBC-ENTMCNC: 33.3 GM/DL — SIGNIFICANT CHANGE UP (ref 32–36)
MCV RBC AUTO: 100 FL — SIGNIFICANT CHANGE UP (ref 80–100)
MCV RBC AUTO: 95.1 FL — SIGNIFICANT CHANGE UP (ref 80–100)
MCV RBC AUTO: 97.3 FL — SIGNIFICANT CHANGE UP (ref 80–100)
PLATELET # BLD AUTO: 179 K/UL — SIGNIFICANT CHANGE UP (ref 150–400)
PLATELET # BLD AUTO: 182 K/UL — SIGNIFICANT CHANGE UP (ref 150–400)
PLATELET # BLD AUTO: 202 K/UL — SIGNIFICANT CHANGE UP (ref 150–400)
POTASSIUM SERPL-MCNC: 3.9 MMOL/L — SIGNIFICANT CHANGE UP (ref 3.5–5.3)
POTASSIUM SERPL-SCNC: 3.9 MMOL/L — SIGNIFICANT CHANGE UP (ref 3.5–5.3)
RBC # BLD: 2.4 M/UL — LOW (ref 4.2–5.8)
RBC # BLD: 2.64 M/UL — LOW (ref 4.2–5.8)
RBC # BLD: 2.96 M/UL — LOW (ref 4.2–5.8)
RBC # FLD: 14.5 % — SIGNIFICANT CHANGE UP (ref 10.3–14.5)
RBC # FLD: 14.6 % — HIGH (ref 10.3–14.5)
RBC # FLD: 14.7 % — HIGH (ref 10.3–14.5)
SODIUM SERPL-SCNC: 140 MMOL/L — SIGNIFICANT CHANGE UP (ref 135–145)
WBC # BLD: 6.6 K/UL — SIGNIFICANT CHANGE UP (ref 3.8–10.5)
WBC # BLD: 6.9 K/UL — SIGNIFICANT CHANGE UP (ref 3.8–10.5)
WBC # BLD: 7.2 K/UL — SIGNIFICANT CHANGE UP (ref 3.8–10.5)
WBC # FLD AUTO: 6.6 K/UL — SIGNIFICANT CHANGE UP (ref 3.8–10.5)
WBC # FLD AUTO: 6.9 K/UL — SIGNIFICANT CHANGE UP (ref 3.8–10.5)
WBC # FLD AUTO: 7.2 K/UL — SIGNIFICANT CHANGE UP (ref 3.8–10.5)

## 2019-01-01 RX ADMIN — PANTOPRAZOLE SODIUM 40 MILLIGRAM(S): 20 TABLET, DELAYED RELEASE ORAL at 05:25

## 2019-01-01 RX ADMIN — Medication 100 MILLIGRAM(S): at 11:40

## 2019-01-01 RX ADMIN — SENNA PLUS 2 TABLET(S): 8.6 TABLET ORAL at 22:02

## 2019-01-01 RX ADMIN — PANTOPRAZOLE SODIUM 40 MILLIGRAM(S): 20 TABLET, DELAYED RELEASE ORAL at 17:37

## 2019-01-01 NOTE — PROGRESS NOTE ADULT - SUBJECTIVE AND OBJECTIVE BOX
PGY 1 Note discussed with supervising resident and primary attending    Patient is a 89y old  Male who presents with a chief complaint of Dark stool for 2 days (01 Jan 2019 08:26)      INTERVAL HPI/OVERNIGHT EVENTS: no bowel movement overnight, asymptomatic, no active bleeding however Hb dropped from 8.9 to 7.4, repeat was 8.1, will give 1 PRBC.    MEDICATIONS  (STANDING):  docusate sodium 100 milliGRAM(s) Oral daily  pantoprazole  Injectable 40 milliGRAM(s) IV Push two times a day  senna 2 Tablet(s) Oral at bedtime    MEDICATIONS  (PRN):      __________________________________________________  REVIEW OF SYSTEMS:    CONSTITUTIONAL: No fever,   EYES: no acute visual disturbances  NECK: No pain or stiffness  RESPIRATORY: No cough; No shortness of breath  CARDIOVASCULAR: No chest pain, no palpitations  GASTROINTESTINAL: No pain. No nausea or vomiting; No diarrhea   NEUROLOGICAL: No headache or numbness, no tremors  MUSCULOSKELETAL: No joint pain, no muscle pain  GENITOURINARY: no dysuria, no frequency, no hesitancy  PSYCHIATRY: no depression , no anxiety  ALL OTHER  ROS negative        Vital Signs Last 24 Hrs  T(C): 36.4 (01 Jan 2019 10:31), Max: 36.8 (31 Dec 2018 21:16)  T(F): 97.5 (01 Jan 2019 10:31), Max: 98.3 (31 Dec 2018 21:16)  HR: 87 (01 Jan 2019 10:31) (87 - 88)  BP: 110/57 (01 Jan 2019 10:31) (102/50 - 115/42)  BP(mean): --  RR: 16 (01 Jan 2019 10:31) (14 - 17)  SpO2: 99% (01 Jan 2019 10:31) (98% - 100%)    ________________________________________________  PHYSICAL EXAM:  GENERAL: NAD  HEENT: Normocephalic;  conjunctivae and sclerae clear; moist mucous membranes;   NECK : supple  CHEST/LUNG: Clear to auscultation bilaterally with good air entry   HEART: S1 S2  regular; no murmurs, gallops or rubs  ABDOMEN: Soft, Nontender, Nondistended; Bowel sounds present  EXTREMITIES: no cyanosis; no edema; no calf tenderness  SKIN: warm and dry; no rash  NERVOUS SYSTEM:  Awake and alert; Oriented  to place, person and time ; no new deficits    _________________________________________________  LABS:                        8.1    6.9   )-----------( 202      ( 01 Jan 2019 09:08 )             25.7     01-01    140  |  106  |  12  ----------------------------<  114<H>  3.9   |  26  |  1.09    Ca    7.5<L>      01 Jan 2019 07:26      PT/INR - ( 31 Dec 2018 06:39 )   PT: 11.9 sec;   INR: 1.07 ratio             CAPILLARY BLOOD GLUCOSE            RADIOLOGY & ADDITIONAL TESTS:    Imaging Personally Reviewed:   YES    Consultant(s) Notes Reviewed:   YES    Care Discussed with Consultants :  YES    Plan of care was discussed with patient and /or primary care giver; all questions and concerns were addressed and care was aligned with patient's wishes.

## 2019-01-01 NOTE — PROGRESS NOTE ADULT - PROBLEM SELECTOR PLAN 1
no bowel movement overnight, asymptomatic, no active bleeding however Hb dropped from 8.9 to 7.4, repeat was 8.1, will give 1 PRBC.  p/w blood BM for 2 days - 3-4 episodes   Hb level of 9.4 on admission - h/o anemia with unknown baseline  FOBT positive   maroon stool on rectal exam- no ext. hemorrhoid noted  orthostatic negative   holding aspirin and  DVT prophylaxis   colonoscopy showed diverticulosis and int. hemorrhoid, no active bleeding  on protonix 40 BID, stool softner and high fiber diet  on regular diet  GI Dr. Woods  FU CBC at 4pm

## 2019-01-01 NOTE — PROGRESS NOTE ADULT - SUBJECTIVE AND OBJECTIVE BOX
Patient is a 89y old  Male who presents with a chief complaint of Dark stool for 2 days (31 Dec 2018 15:42)    pt seen in icu [  ], reg med floor [ x  ], bed [ x ], chair at bedside [   ], a+o x3 [x], lethargic [  ],  nad [ x ]    no c/o abd pain, bleeding per rectum, melena, n/v, dizziness or sob        Allergies    No Known Allergies        Vitals    T(F): 98 (01-01-19 @ 04:41), Max: 98.3 (12-31-18 @ 21:16)  HR: 88 (01-01-19 @ 04:41) (88 - 114)  BP: 115/42 (01-01-19 @ 04:41) (102/50 - 137/70)  RR: 17 (01-01-19 @ 04:41) (14 - 17)  SpO2: 98% (01-01-19 @ 04:41) (98% - 100%)  Wt(kg): --  CAPILLARY BLOOD GLUCOSE          Labs                          7.4    6.6   )-----------( 179      ( 01 Jan 2019 07:26 )             24.0       01-01    140  |  106  |  12  ----------------------------<  114<H>  3.9   |  26  |  1.09    Ca    7.5<L>      01 Jan 2019 07:26                  Radiology Results      Meds    MEDICATIONS  (STANDING):  docusate sodium 100 milliGRAM(s) Oral daily  pantoprazole  Injectable 40 milliGRAM(s) IV Push two times a day  senna 2 Tablet(s) Oral at bedtime      MEDICATIONS  (PRN):      Physical Exam    Neuro :  no focal deficits  Respiratory: CTA B/L  CV: RRR, S1S2, no murmurs,   Abdominal: Soft, NT, ND +BS,  Extremities: No edema, + peripheral pulses    ASSESSMENT    Gastrointestinal hemorrhage possible 2nd diverticular bleed  anemia 2nd to acute blood 2nd to gi bleed  s/p justin  h/o Prostate cancer  Hypertension  No significant past surgical history      PLAN    cont to monitor cbc  cont protonix  pt s/p transfusion 1 prbc   hemoglobin still trending down this am  repeat cbc  if hemoglobin still low transfuse 2 units prbc  gi f/u  ct abd/pelv with -No acute abdominal/pelvic pathology. Moderate proximal sigmoid diverticulosis noted   s/p colonoscopy with internal hemorrhoids, diverticulosis, no active bleeding  cont colace and senna  cont current meds

## 2019-01-02 VITALS
OXYGEN SATURATION: 95 % | HEART RATE: 86 BPM | SYSTOLIC BLOOD PRESSURE: 136 MMHG | DIASTOLIC BLOOD PRESSURE: 77 MMHG | TEMPERATURE: 98 F | RESPIRATION RATE: 18 BRPM

## 2019-01-02 LAB
ANION GAP SERPL CALC-SCNC: 8 MMOL/L — SIGNIFICANT CHANGE UP (ref 5–17)
BUN SERPL-MCNC: 16 MG/DL — SIGNIFICANT CHANGE UP (ref 7–18)
CALCIUM SERPL-MCNC: 7.7 MG/DL — LOW (ref 8.4–10.5)
CHLORIDE SERPL-SCNC: 105 MMOL/L — SIGNIFICANT CHANGE UP (ref 96–108)
CO2 SERPL-SCNC: 26 MMOL/L — SIGNIFICANT CHANGE UP (ref 22–31)
CREAT SERPL-MCNC: 1.09 MG/DL — SIGNIFICANT CHANGE UP (ref 0.5–1.3)
GLUCOSE SERPL-MCNC: 110 MG/DL — HIGH (ref 70–99)
HCT VFR BLD CALC: 28.9 % — LOW (ref 39–50)
HGB BLD-MCNC: 9.4 G/DL — LOW (ref 13–17)
MCHC RBC-ENTMCNC: 31.9 PG — SIGNIFICANT CHANGE UP (ref 27–34)
MCHC RBC-ENTMCNC: 32.4 GM/DL — SIGNIFICANT CHANGE UP (ref 32–36)
MCV RBC AUTO: 98.4 FL — SIGNIFICANT CHANGE UP (ref 80–100)
PLATELET # BLD AUTO: 214 K/UL — SIGNIFICANT CHANGE UP (ref 150–400)
POTASSIUM SERPL-MCNC: 3.9 MMOL/L — SIGNIFICANT CHANGE UP (ref 3.5–5.3)
POTASSIUM SERPL-SCNC: 3.9 MMOL/L — SIGNIFICANT CHANGE UP (ref 3.5–5.3)
RBC # BLD: 2.94 M/UL — LOW (ref 4.2–5.8)
RBC # FLD: 15 % — HIGH (ref 10.3–14.5)
SODIUM SERPL-SCNC: 139 MMOL/L — SIGNIFICANT CHANGE UP (ref 135–145)
WBC # BLD: 7.8 K/UL — SIGNIFICANT CHANGE UP (ref 3.8–10.5)
WBC # FLD AUTO: 7.8 K/UL — SIGNIFICANT CHANGE UP (ref 3.8–10.5)

## 2019-01-02 PROCEDURE — 93005 ELECTROCARDIOGRAM TRACING: CPT

## 2019-01-02 PROCEDURE — 83010 ASSAY OF HAPTOGLOBIN QUANT: CPT

## 2019-01-02 PROCEDURE — 99285 EMERGENCY DEPT VISIT HI MDM: CPT | Mod: 25

## 2019-01-02 PROCEDURE — 36415 COLL VENOUS BLD VENIPUNCTURE: CPT

## 2019-01-02 PROCEDURE — 82272 OCCULT BLD FECES 1-3 TESTS: CPT

## 2019-01-02 PROCEDURE — 74177 CT ABD & PELVIS W/CONTRAST: CPT

## 2019-01-02 PROCEDURE — 84100 ASSAY OF PHOSPHORUS: CPT

## 2019-01-02 PROCEDURE — 82728 ASSAY OF FERRITIN: CPT

## 2019-01-02 PROCEDURE — 97162 PT EVAL MOD COMPLEX 30 MIN: CPT

## 2019-01-02 PROCEDURE — 86923 COMPATIBILITY TEST ELECTRIC: CPT

## 2019-01-02 PROCEDURE — 83735 ASSAY OF MAGNESIUM: CPT

## 2019-01-02 PROCEDURE — 82306 VITAMIN D 25 HYDROXY: CPT

## 2019-01-02 PROCEDURE — 86850 RBC ANTIBODY SCREEN: CPT

## 2019-01-02 PROCEDURE — 86900 BLOOD TYPING SEROLOGIC ABO: CPT

## 2019-01-02 PROCEDURE — 80061 LIPID PANEL: CPT

## 2019-01-02 PROCEDURE — 83550 IRON BINDING TEST: CPT

## 2019-01-02 PROCEDURE — 82607 VITAMIN B-12: CPT

## 2019-01-02 PROCEDURE — 83036 HEMOGLOBIN GLYCOSYLATED A1C: CPT

## 2019-01-02 PROCEDURE — 83540 ASSAY OF IRON: CPT

## 2019-01-02 PROCEDURE — P9040: CPT

## 2019-01-02 PROCEDURE — 82550 ASSAY OF CK (CPK): CPT

## 2019-01-02 PROCEDURE — 80053 COMPREHEN METABOLIC PANEL: CPT

## 2019-01-02 PROCEDURE — 82553 CREATINE MB FRACTION: CPT

## 2019-01-02 PROCEDURE — 84443 ASSAY THYROID STIM HORMONE: CPT

## 2019-01-02 PROCEDURE — 80048 BASIC METABOLIC PNL TOTAL CA: CPT

## 2019-01-02 PROCEDURE — 85610 PROTHROMBIN TIME: CPT

## 2019-01-02 PROCEDURE — 83615 LACTATE (LD) (LDH) ENZYME: CPT

## 2019-01-02 PROCEDURE — 96374 THER/PROPH/DIAG INJ IV PUSH: CPT

## 2019-01-02 PROCEDURE — 85027 COMPLETE CBC AUTOMATED: CPT

## 2019-01-02 PROCEDURE — 84484 ASSAY OF TROPONIN QUANT: CPT

## 2019-01-02 PROCEDURE — 84466 ASSAY OF TRANSFERRIN: CPT

## 2019-01-02 PROCEDURE — 36430 TRANSFUSION BLD/BLD COMPNT: CPT

## 2019-01-02 PROCEDURE — 85730 THROMBOPLASTIN TIME PARTIAL: CPT

## 2019-01-02 PROCEDURE — 82746 ASSAY OF FOLIC ACID SERUM: CPT

## 2019-01-02 PROCEDURE — 86901 BLOOD TYPING SEROLOGIC RH(D): CPT

## 2019-01-02 RX ORDER — ASPIRIN/CALCIUM CARB/MAGNESIUM 324 MG
1 TABLET ORAL
Qty: 0 | Refills: 0 | COMMUNITY

## 2019-01-02 RX ORDER — SENNA PLUS 8.6 MG/1
2 TABLET ORAL
Qty: 30 | Refills: 0
Start: 2019-01-02 | End: 2019-01-16

## 2019-01-02 RX ORDER — DOCUSATE SODIUM 100 MG
1 CAPSULE ORAL
Qty: 15 | Refills: 0
Start: 2019-01-02 | End: 2019-01-16

## 2019-01-02 RX ADMIN — Medication 100 MILLIGRAM(S): at 12:00

## 2019-01-02 RX ADMIN — PANTOPRAZOLE SODIUM 40 MILLIGRAM(S): 20 TABLET, DELAYED RELEASE ORAL at 06:16

## 2019-01-02 NOTE — DISCHARGE NOTE ADULT - INSTRUCTIONS
Recommend the DASH Diet. This diet emphasizes vegetables, fruits, and fat-free or low-fat dairy products.  Includes whole grains, fish, poultry, beans, seeds, nuts, and vegetable oils. Please limit sodium, sweets, sugary beverages, and red meats.  Patient oriented on diet and refers to understand.

## 2019-01-02 NOTE — DISCHARGE NOTE ADULT - PLAN OF CARE
Resolution of symptoms You presented with blood in stool. You received total 2 units PRBC. Your colonoscopy showed diverticulosis and internal hemorrhoid, no active bleeding. Please continue taking stool softner and high fiber diet. Your hemoglobin is stable and no bloody bowel movement. Please follow up with gastroenterologist and PCP In 1 week after discharge. Continue with blood pressure medication. Maintain a healthy diet that consist of low sugar, low fat, low sodium diet. Exercise frequently if possible.  Follow up with primary care physician in one week after discharge.

## 2019-01-02 NOTE — PROGRESS NOTE ADULT - SUBJECTIVE AND OBJECTIVE BOX
Patient is a 89y old  Male who presents with a chief complaint of Dark stool for 2 days (02 Jan 2019 09:48)    pt seen in icu [  ], reg med floor [ x  ], bed [ x ], chair at bedside [   ], a+o x3 [x], lethargic [  ],  nad [ x ]      Allergies    No Known Allergies        Vitals    T(F): 98 (01-02-19 @ 12:01), Max: 98.6 (01-01-19 @ 14:00)  HR: 86 (01-02-19 @ 12:01) (67 - 86)  BP: 136/77 (01-02-19 @ 12:01) (119/63 - 136/77)  RR: 18 (01-02-19 @ 12:01) (16 - 18)  SpO2: 95% (01-02-19 @ 12:01) (95% - 100%)  Wt(kg): --  CAPILLARY BLOOD GLUCOSE          Labs                          9.4    7.8   )-----------( 214      ( 02 Jan 2019 08:30 )             28.9       01-02    139  |  105  |  16  ----------------------------<  110<H>  3.9   |  26  |  1.09    Ca    7.7<L>      02 Jan 2019 08:30                  Radiology Results      Meds    MEDICATIONS  (STANDING):  docusate sodium 100 milliGRAM(s) Oral daily  senna 2 Tablet(s) Oral at bedtime      MEDICATIONS  (PRN):      Physical Exam      Neuro :  no focal deficits  Respiratory: CTA B/L  CV: RRR, S1S2, no murmurs,   Abdominal: Soft, NT, ND +BS,  Extremities: No edema, + peripheral pulses    ASSESSMENT    Gastrointestinal hemorrhage possible 2nd diverticular bleed  anemia 2nd to acute blood 2nd to gi bleed  s/p justin  h/o Prostate cancer  Hypertension  No significant past surgical history      PLAN      cont protonix  pt s/p transfusion 1 more prbc   h/h stable  gi f/u  ct abd/pelv with -No acute abdominal/pelvic pathology. Moderate proximal sigmoid diverticulosis noted   s/p colonoscopy with internal hemorrhoids, diverticulosis, no active bleeding  cont colace and senna  cont current meds  pt stable for d/c

## 2019-01-02 NOTE — PROGRESS NOTE ADULT - REASON FOR ADMISSION
Dark stool for 2 days

## 2019-01-02 NOTE — DISCHARGE NOTE ADULT - MEDICATION SUMMARY - MEDICATIONS TO TAKE
I will START or STAY ON the medications listed below when I get home from the hospital:    lisinopril 5 mg oral tablet  --  by mouth 2 times a day  -- Indication: For Hypertension    docusate sodium 100 mg oral capsule  -- 1 cap(s) by mouth once a day  -- Indication: For diverticulosis    senna oral tablet  -- 2 tab(s) by mouth once a day (at bedtime)  -- Indication: For diverticulosis

## 2019-01-02 NOTE — DISCHARGE NOTE ADULT - HOSPITAL COURSE
89/M from home independent at baseline with PMH of prostate cancer, HTN and anemia (unknown baseline) presented to ED with bloody BM for 2 days. In ED: vitals: 124/68, 99, 97.8, 20 (99), labs pertinent for H/H of 9.4/29.9, coags WNL, FOBT positive. His colonoscopy showed diverticulosis and int. hemorrhoid, no active bleeding. He is on stool softner and high fiber diet. He received 2 units PRBC total. His hemoglobin is stable and no bloody bowel movement. GI Dr. Woods was consulted.  His BP is stable and will continue his home meds.  Patient is stable for discharge per attending and is advised to follow up with PCP as outpatient.  Please refer to patient's complete medical chart with documents for a full hospital course, for this is only a brief summary.

## 2019-01-02 NOTE — DISCHARGE NOTE ADULT - CARE PLAN
Principal Discharge DX:	Diverticula of colon  Goal:	Resolution of symptoms  Assessment and plan of treatment:	You presented with blood in stool. You received total 2 units PRBC. Your colonoscopy showed diverticulosis and internal hemorrhoid, no active bleeding. Please continue taking stool softner and high fiber diet. Your hemoglobin is stable and no bloody bowel movement. Please follow up with gastroenterologist and PCP In 1 week after discharge.  Secondary Diagnosis:	Hypertension  Assessment and plan of treatment:	Continue with blood pressure medication. Maintain a healthy diet that consist of low sugar, low fat, low sodium diet. Exercise frequently if possible.  Follow up with primary care physician in one week after discharge.

## 2019-01-02 NOTE — DISCHARGE NOTE ADULT - PATIENT PORTAL LINK FT
You can access the [facility] Patient Portal, offered by [facility], by registering with the following website: [email or web address]

## 2019-08-14 NOTE — PATIENT PROFILE ADULT - DO YOU FEEL UNSAFE AT WORK?
Andreina guido for D/C planning with [employer] [phone]   She would like to be called with information or questions no

## 2019-09-09 ENCOUNTER — EMERGENCY (EMERGENCY)
Facility: HOSPITAL | Age: 84
LOS: 1 days | Discharge: HOME | End: 2019-09-09
Attending: EMERGENCY MEDICINE
Payer: COMMERCIAL

## 2019-09-09 VITALS
SYSTOLIC BLOOD PRESSURE: 147 MMHG | RESPIRATION RATE: 20 BRPM | DIASTOLIC BLOOD PRESSURE: 81 MMHG | OXYGEN SATURATION: 97 % | HEIGHT: 63 IN | WEIGHT: 171.96 LBS | HEART RATE: 81 BPM | TEMPERATURE: 98 F

## 2019-09-09 VITALS — SYSTOLIC BLOOD PRESSURE: 156 MMHG | DIASTOLIC BLOOD PRESSURE: 84 MMHG | HEART RATE: 77 BPM

## 2019-09-09 PROCEDURE — 93971 EXTREMITY STUDY: CPT

## 2019-09-09 PROCEDURE — 99283 EMERGENCY DEPT VISIT LOW MDM: CPT

## 2019-09-09 PROCEDURE — 93971 EXTREMITY STUDY: CPT | Mod: 26,LT

## 2019-09-09 PROCEDURE — 99284 EMERGENCY DEPT VISIT MOD MDM: CPT | Mod: 25

## 2019-09-09 NOTE — ED PROVIDER NOTE - PROGRESS NOTE DETAILS
D/w Dr. Rainey, states it was a negative DVT u/s and Xray and to follow up in her office this week, and agrees will have pt f/u with vascular for possible PAD.
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
## 2019-09-09 NOTE — ED PROVIDER NOTE - PATIENT PORTAL LINK FT
You can access the FollowMyHealth Patient Portal offered by [facility] by registering at the following website: [email or web address]. By joining [facility]’s FollowMyHealth portal, you will also be able to view your health information using other applications (apps) compatible with our system.

## 2019-09-09 NOTE — ED PROVIDER NOTE - PHYSICAL EXAMINATION
Afebrile, hemodynamically stable, saturating well on RA  NAD, well appearing  Head NCAT  EOMI grossly, anicteric  MMM  No JVD  RRR, nml S1/S2, no m/r/g  Lungs CTAB, no w/r/r  Abd soft, NT, ND, nml BS, no rebound or guarding  AAO, CN's 3-12 grossly intact  LLE postop/graft appearance, appearance of woody change of L leg with mild 0.5+ pitting edema, mild 0.5+ RLE pitting edema  Skin warm, dry

## 2019-09-09 NOTE — ED PROVIDER NOTE - CLINICAL SUMMARY MEDICAL DECISION MAKING FREE TEXT BOX
Character low suspicion for acute fluid overload and no e/o overload otherwise. No e/o DVT on today's u/s and 3 wks ago with low suspicion for acute DVT. Appearance low suspicion for acute PAD, will f/u as outpt with vascular and PMD regarding this for further w/u. No e/o cellulitis or compartment syndrome. Character of high suspicion for acute on chronic PVD 2/2 long standing chronic HTN. Character low suspicion for acute fluid overload and no e/o overload otherwise. No e/o DVT on today's u/s and 3 wks ago with low suspicion for acute DVT. Appearance low suspicion for acute PAD, will f/u as outpt with vascular and PMD regarding this for further w/u. No e/o cellulitis or compartment syndrome. Character of high suspicion for acute on chronic PVD 2/2 long standing chronic HTN. Patient is well appearing, NAD, afebrile, hemodynamically stable. Leg wrapped. Discharged with instructions in further symptomatic care, return precautions, and need for PMD and vascular f/u.
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
## 2019-09-09 NOTE — ED PROVIDER NOTE - NSFOLLOWUPINSTRUCTIONS_ED_ALL_ED_FT
Please follow up with your primary care doctor and vascular surgery in 1-2 days.  Please return here if you have worsening swelling, pain, fever, or any other symptoms.

## 2019-09-09 NOTE — ED ADULT TRIAGE NOTE - CHIEF COMPLAINT QUOTE
Swelling to left ankle x 1 week. No recent5 injury Swelling to left ankle x 1 week. No recent5 injury Has brace on left leg.

## 2020-04-07 NOTE — ED PROVIDER NOTE - CROS ED ROS STATEMENT
Subjective:       Patient ID: Kavita Tineo is a 31 y.o. female.    Chief Complaint: No chief complaint on file.    Pt with ADD  Symptoms onset in elementary school- tested positive at that age  Pt states symptoms worsened in her 20s - college, had a baby - difficulty completing task and focusing  Pt has been on vyvanse 40 mg since 2011- no issues , no anxiety, no heart palpitations, no chest pain- pt has increased focus, task completion, and job performance on medication  questionaire complete    The patient location is: [facility]  The chief complaint leading to consultation is: ADD  Visit type: Virtual visit with synchronous audio and video  Total time spent with patient:12 minutes, had to convert to audio after a few minutes due to poor connection  Each patient to whom he or she provides medical services by telemedicine is:  (1) informed of the relationship between the physician and patient and the respective role of any other health care provider with respect to management of the patient; and (2) notified that he or she may decline to receive medical services by telemedicine and may withdraw from such care at any time.        Review of Systems   Constitutional: Negative.    HENT: Negative.    Eyes: Negative.    Respiratory: Negative.    Cardiovascular: Negative.    Gastrointestinal: Negative.    Endocrine: Negative.    Genitourinary: Negative.    Musculoskeletal: Negative.    Skin: Negative.    Hematological: Negative.    Psychiatric/Behavioral: Positive for decreased concentration.       Objective:      Physical Exam   Constitutional: She is oriented to person, place, and time. She appears well-developed and well-nourished.   HENT:   Head: Normocephalic.   Eyes: Right eye exhibits no discharge. Left eye exhibits no discharge.   Cardiovascular:   No perioral cyanosis   Pulmonary/Chest: Effort normal.   No audible wheeze   Musculoskeletal:   Freely moves all visible extremitites   Neurological: She is alert  and oriented to person, place, and time.   Skin:   No visible rash   Psychiatric: She has a normal mood and affect. Her behavior is normal. Judgment and thought content normal.       Assessment:       No diagnosis found.    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

## 2021-03-24 NOTE — H&P ADULT - NSHPROSALLOTHERNEGRD_GEN_ALL_CORE
All other review of systems negative, except as noted in HPI
[FreeTextEntry3] : IEliceo Shabtai MD, personally saw and evaluated the patient and developed the plan as documented above. I concur or have edited the note as appropriate.\par

## 2022-01-01 ENCOUNTER — INPATIENT (INPATIENT)
Facility: HOSPITAL | Age: 87
LOS: 7 days | End: 2022-01-27
Attending: STUDENT IN AN ORGANIZED HEALTH CARE EDUCATION/TRAINING PROGRAM | Admitting: STUDENT IN AN ORGANIZED HEALTH CARE EDUCATION/TRAINING PROGRAM
Payer: MEDICARE

## 2022-01-01 VITALS
DIASTOLIC BLOOD PRESSURE: 54 MMHG | TEMPERATURE: 98 F | RESPIRATION RATE: 22 BRPM | SYSTOLIC BLOOD PRESSURE: 85 MMHG | HEART RATE: 102 BPM

## 2022-01-01 VITALS
SYSTOLIC BLOOD PRESSURE: 111 MMHG | DIASTOLIC BLOOD PRESSURE: 73 MMHG | OXYGEN SATURATION: 100 % | RESPIRATION RATE: 18 BRPM | TEMPERATURE: 99 F | HEART RATE: 81 BPM | HEIGHT: 63 IN

## 2022-01-01 DIAGNOSIS — R31.0 GROSS HEMATURIA: ICD-10-CM

## 2022-01-01 DIAGNOSIS — D64.9 ANEMIA, UNSPECIFIED: ICD-10-CM

## 2022-01-01 DIAGNOSIS — E87.5 HYPERKALEMIA: ICD-10-CM

## 2022-01-01 DIAGNOSIS — N17.9 ACUTE KIDNEY FAILURE, UNSPECIFIED: ICD-10-CM

## 2022-01-01 DIAGNOSIS — I47.1 SUPRAVENTRICULAR TACHYCARDIA: ICD-10-CM

## 2022-01-01 DIAGNOSIS — N19 UNSPECIFIED KIDNEY FAILURE: ICD-10-CM

## 2022-01-01 DIAGNOSIS — Z29.9 ENCOUNTER FOR PROPHYLACTIC MEASURES, UNSPECIFIED: ICD-10-CM

## 2022-01-01 DIAGNOSIS — E87.1 HYPO-OSMOLALITY AND HYPONATREMIA: ICD-10-CM

## 2022-01-01 DIAGNOSIS — C61 MALIGNANT NEOPLASM OF PROSTATE: ICD-10-CM

## 2022-01-01 DIAGNOSIS — Z01.818 ENCOUNTER FOR OTHER PREPROCEDURAL EXAMINATION: ICD-10-CM

## 2022-01-01 DIAGNOSIS — N13.9 OBSTRUCTIVE AND REFLUX UROPATHY, UNSPECIFIED: ICD-10-CM

## 2022-01-01 LAB
ALBUMIN SERPL ELPH-MCNC: 2.1 G/DL — LOW (ref 3.3–5)
ALBUMIN SERPL ELPH-MCNC: 2.2 G/DL — LOW (ref 3.3–5)
ALBUMIN SERPL ELPH-MCNC: 2.4 G/DL — LOW (ref 3.3–5)
ALBUMIN SERPL ELPH-MCNC: 2.6 G/DL — LOW (ref 3.3–5)
ALBUMIN SERPL ELPH-MCNC: 2.6 G/DL — LOW (ref 3.3–5)
ALBUMIN SERPL ELPH-MCNC: 3.1 G/DL — LOW (ref 3.3–5)
ALP SERPL-CCNC: 54 U/L — SIGNIFICANT CHANGE UP (ref 40–120)
ALP SERPL-CCNC: 61 U/L — SIGNIFICANT CHANGE UP (ref 40–120)
ALP SERPL-CCNC: 62 U/L — SIGNIFICANT CHANGE UP (ref 40–120)
ALP SERPL-CCNC: 63 U/L — SIGNIFICANT CHANGE UP (ref 40–120)
ALP SERPL-CCNC: 65 U/L — SIGNIFICANT CHANGE UP (ref 40–120)
ALP SERPL-CCNC: 67 U/L — SIGNIFICANT CHANGE UP (ref 40–120)
ALP SERPL-CCNC: 73 U/L — SIGNIFICANT CHANGE UP (ref 40–120)
ALP SERPL-CCNC: 74 U/L — SIGNIFICANT CHANGE UP (ref 40–120)
ALT FLD-CCNC: 12 U/L — SIGNIFICANT CHANGE UP (ref 4–41)
ALT FLD-CCNC: 12 U/L — SIGNIFICANT CHANGE UP (ref 4–41)
ALT FLD-CCNC: 13 U/L — SIGNIFICANT CHANGE UP (ref 4–41)
ALT FLD-CCNC: 15 U/L — SIGNIFICANT CHANGE UP (ref 4–41)
ALT FLD-CCNC: 16 U/L — SIGNIFICANT CHANGE UP (ref 4–41)
ALT FLD-CCNC: 17 U/L — SIGNIFICANT CHANGE UP (ref 4–41)
ALT FLD-CCNC: 18 U/L — SIGNIFICANT CHANGE UP (ref 4–41)
ALT FLD-CCNC: 20 U/L — SIGNIFICANT CHANGE UP (ref 4–41)
ANION GAP SERPL CALC-SCNC: 10 MMOL/L — SIGNIFICANT CHANGE UP (ref 7–14)
ANION GAP SERPL CALC-SCNC: 11 MMOL/L — SIGNIFICANT CHANGE UP (ref 7–14)
ANION GAP SERPL CALC-SCNC: 12 MMOL/L — SIGNIFICANT CHANGE UP (ref 7–14)
ANION GAP SERPL CALC-SCNC: 14 MMOL/L — SIGNIFICANT CHANGE UP (ref 7–14)
ANION GAP SERPL CALC-SCNC: 14 MMOL/L — SIGNIFICANT CHANGE UP (ref 7–14)
ANION GAP SERPL CALC-SCNC: 15 MMOL/L — HIGH (ref 7–14)
ANION GAP SERPL CALC-SCNC: 15 MMOL/L — HIGH (ref 7–14)
ANION GAP SERPL CALC-SCNC: 17 MMOL/L — HIGH (ref 7–14)
ANION GAP SERPL CALC-SCNC: 20 MMOL/L — HIGH (ref 7–14)
ANION GAP SERPL CALC-SCNC: 8 MMOL/L — SIGNIFICANT CHANGE UP (ref 7–14)
ANION GAP SERPL CALC-SCNC: 9 MMOL/L — SIGNIFICANT CHANGE UP (ref 7–14)
APPEARANCE UR: ABNORMAL
APTT BLD: 29.1 SEC — SIGNIFICANT CHANGE UP (ref 27–36.3)
APTT BLD: 31.5 SEC — SIGNIFICANT CHANGE UP (ref 27–36.3)
APTT BLD: 32.9 SEC — SIGNIFICANT CHANGE UP (ref 27–36.3)
AST SERPL-CCNC: 14 U/L — SIGNIFICANT CHANGE UP (ref 4–40)
AST SERPL-CCNC: 14 U/L — SIGNIFICANT CHANGE UP (ref 4–40)
AST SERPL-CCNC: 15 U/L — SIGNIFICANT CHANGE UP (ref 4–40)
AST SERPL-CCNC: 16 U/L — SIGNIFICANT CHANGE UP (ref 4–40)
AST SERPL-CCNC: 17 U/L — SIGNIFICANT CHANGE UP (ref 4–40)
AST SERPL-CCNC: 17 U/L — SIGNIFICANT CHANGE UP (ref 4–40)
AST SERPL-CCNC: 18 U/L — SIGNIFICANT CHANGE UP (ref 4–40)
AST SERPL-CCNC: 21 U/L — SIGNIFICANT CHANGE UP (ref 4–40)
BACTERIA # UR AUTO: ABNORMAL
BASE EXCESS BLDV CALC-SCNC: -6.4 MMOL/L — LOW (ref -2–3)
BASE EXCESS BLDV CALC-SCNC: -7.6 MMOL/L — LOW (ref -2–3)
BASE EXCESS BLDV CALC-SCNC: -9.1 MMOL/L — LOW (ref -2–3)
BASE EXCESS BLDV CALC-SCNC: -9.2 MMOL/L — LOW (ref -2–3)
BASE EXCESS BLDV CALC-SCNC: 0.1 MMOL/L — SIGNIFICANT CHANGE UP (ref -2–3)
BASOPHILS # BLD AUTO: 0 K/UL — SIGNIFICANT CHANGE UP (ref 0–0.2)
BASOPHILS # BLD AUTO: 0.01 K/UL — SIGNIFICANT CHANGE UP (ref 0–0.2)
BASOPHILS # BLD AUTO: 0.02 K/UL — SIGNIFICANT CHANGE UP (ref 0–0.2)
BASOPHILS NFR BLD AUTO: 0 % — SIGNIFICANT CHANGE UP (ref 0–2)
BASOPHILS NFR BLD AUTO: 0.1 % — SIGNIFICANT CHANGE UP (ref 0–2)
BASOPHILS NFR BLD AUTO: 0.2 % — SIGNIFICANT CHANGE UP (ref 0–2)
BILIRUB SERPL-MCNC: 0.3 MG/DL — SIGNIFICANT CHANGE UP (ref 0.2–1.2)
BILIRUB SERPL-MCNC: 0.4 MG/DL — SIGNIFICANT CHANGE UP (ref 0.2–1.2)
BILIRUB SERPL-MCNC: 0.4 MG/DL — SIGNIFICANT CHANGE UP (ref 0.2–1.2)
BILIRUB SERPL-MCNC: 0.6 MG/DL — SIGNIFICANT CHANGE UP (ref 0.2–1.2)
BILIRUB SERPL-MCNC: 0.6 MG/DL — SIGNIFICANT CHANGE UP (ref 0.2–1.2)
BILIRUB SERPL-MCNC: 0.7 MG/DL — SIGNIFICANT CHANGE UP (ref 0.2–1.2)
BILIRUB SERPL-MCNC: 0.8 MG/DL — SIGNIFICANT CHANGE UP (ref 0.2–1.2)
BILIRUB SERPL-MCNC: 1 MG/DL — SIGNIFICANT CHANGE UP (ref 0.2–1.2)
BILIRUB UR-MCNC: NEGATIVE — SIGNIFICANT CHANGE UP
BLD GP AB SCN SERPL QL: NEGATIVE — SIGNIFICANT CHANGE UP
BLOOD GAS VENOUS COMPREHENSIVE RESULT: SIGNIFICANT CHANGE UP
BUN SERPL-MCNC: 41 MG/DL — HIGH (ref 7–23)
BUN SERPL-MCNC: 42 MG/DL — HIGH (ref 7–23)
BUN SERPL-MCNC: 46 MG/DL — HIGH (ref 7–23)
BUN SERPL-MCNC: 51 MG/DL — HIGH (ref 7–23)
BUN SERPL-MCNC: 52 MG/DL — HIGH (ref 7–23)
BUN SERPL-MCNC: 56 MG/DL — HIGH (ref 7–23)
BUN SERPL-MCNC: 63 MG/DL — HIGH (ref 7–23)
BUN SERPL-MCNC: 64 MG/DL — HIGH (ref 7–23)
BUN SERPL-MCNC: 64 MG/DL — HIGH (ref 7–23)
BUN SERPL-MCNC: 65 MG/DL — HIGH (ref 7–23)
BUN SERPL-MCNC: 93 MG/DL — HIGH (ref 7–23)
BUN SERPL-MCNC: 95 MG/DL — HIGH (ref 7–23)
BUN SERPL-MCNC: 96 MG/DL — HIGH (ref 7–23)
BUN SERPL-MCNC: 97 MG/DL — HIGH (ref 7–23)
CALCIUM SERPL-MCNC: 7.3 MG/DL — LOW (ref 8.4–10.5)
CALCIUM SERPL-MCNC: 7.4 MG/DL — LOW (ref 8.4–10.5)
CALCIUM SERPL-MCNC: 7.4 MG/DL — LOW (ref 8.4–10.5)
CALCIUM SERPL-MCNC: 7.5 MG/DL — LOW (ref 8.4–10.5)
CALCIUM SERPL-MCNC: 7.5 MG/DL — LOW (ref 8.4–10.5)
CALCIUM SERPL-MCNC: 7.9 MG/DL — LOW (ref 8.4–10.5)
CALCIUM SERPL-MCNC: 8 MG/DL — LOW (ref 8.4–10.5)
CALCIUM SERPL-MCNC: 8.2 MG/DL — LOW (ref 8.4–10.5)
CALCIUM SERPL-MCNC: 8.2 MG/DL — LOW (ref 8.4–10.5)
CALCIUM SERPL-MCNC: 8.3 MG/DL — LOW (ref 8.4–10.5)
CALCIUM SERPL-MCNC: 8.4 MG/DL — SIGNIFICANT CHANGE UP (ref 8.4–10.5)
CALCIUM SERPL-MCNC: 8.5 MG/DL — SIGNIFICANT CHANGE UP (ref 8.4–10.5)
CALCIUM SERPL-MCNC: 8.5 MG/DL — SIGNIFICANT CHANGE UP (ref 8.4–10.5)
CALCIUM SERPL-MCNC: 8.6 MG/DL — SIGNIFICANT CHANGE UP (ref 8.4–10.5)
CALCIUM SERPL-MCNC: 8.6 MG/DL — SIGNIFICANT CHANGE UP (ref 8.4–10.5)
CALCIUM SERPL-MCNC: 8.7 MG/DL — SIGNIFICANT CHANGE UP (ref 8.4–10.5)
CALCIUM SERPL-MCNC: 8.8 MG/DL — SIGNIFICANT CHANGE UP (ref 8.4–10.5)
CALCIUM SERPL-MCNC: 8.8 MG/DL — SIGNIFICANT CHANGE UP (ref 8.4–10.5)
CHLORIDE BLDV-SCNC: 100 MMOL/L — SIGNIFICANT CHANGE UP (ref 96–108)
CHLORIDE BLDV-SCNC: 93 MMOL/L — LOW (ref 96–108)
CHLORIDE BLDV-SCNC: 94 MMOL/L — LOW (ref 96–108)
CHLORIDE BLDV-SCNC: 95 MMOL/L — LOW (ref 96–108)
CHLORIDE BLDV-SCNC: 96 MMOL/L — SIGNIFICANT CHANGE UP (ref 96–108)
CHLORIDE SERPL-SCNC: 100 MMOL/L — SIGNIFICANT CHANGE UP (ref 98–107)
CHLORIDE SERPL-SCNC: 88 MMOL/L — LOW (ref 98–107)
CHLORIDE SERPL-SCNC: 89 MMOL/L — LOW (ref 98–107)
CHLORIDE SERPL-SCNC: 91 MMOL/L — LOW (ref 98–107)
CHLORIDE SERPL-SCNC: 91 MMOL/L — LOW (ref 98–107)
CHLORIDE SERPL-SCNC: 93 MMOL/L — LOW (ref 98–107)
CHLORIDE SERPL-SCNC: 93 MMOL/L — LOW (ref 98–107)
CHLORIDE SERPL-SCNC: 94 MMOL/L — LOW (ref 98–107)
CHLORIDE SERPL-SCNC: 95 MMOL/L — LOW (ref 98–107)
CHLORIDE SERPL-SCNC: 96 MMOL/L — LOW (ref 98–107)
CHLORIDE SERPL-SCNC: 97 MMOL/L — LOW (ref 98–107)
CHLORIDE SERPL-SCNC: 97 MMOL/L — LOW (ref 98–107)
CHLORIDE SERPL-SCNC: 98 MMOL/L — SIGNIFICANT CHANGE UP (ref 98–107)
CK MB BLD-MCNC: 2.5 % — SIGNIFICANT CHANGE UP (ref 0–2.5)
CK MB CFR SERPL CALC: 3.2 NG/ML — SIGNIFICANT CHANGE UP
CK SERPL-CCNC: 127 U/L — SIGNIFICANT CHANGE UP (ref 30–200)
CO2 BLDV-SCNC: 18.1 MMOL/L — LOW (ref 22–26)
CO2 BLDV-SCNC: 18.9 MMOL/L — LOW (ref 22–26)
CO2 BLDV-SCNC: 20.2 MMOL/L — LOW (ref 22–26)
CO2 BLDV-SCNC: 20.7 MMOL/L — LOW (ref 22–26)
CO2 BLDV-SCNC: 25.8 MMOL/L — SIGNIFICANT CHANGE UP (ref 22–26)
CO2 SERPL-SCNC: 16 MMOL/L — LOW (ref 22–31)
CO2 SERPL-SCNC: 17 MMOL/L — LOW (ref 22–31)
CO2 SERPL-SCNC: 18 MMOL/L — LOW (ref 22–31)
CO2 SERPL-SCNC: 19 MMOL/L — LOW (ref 22–31)
CO2 SERPL-SCNC: 20 MMOL/L — LOW (ref 22–31)
CO2 SERPL-SCNC: 22 MMOL/L — SIGNIFICANT CHANGE UP (ref 22–31)
CO2 SERPL-SCNC: 23 MMOL/L — SIGNIFICANT CHANGE UP (ref 22–31)
CO2 SERPL-SCNC: 24 MMOL/L — SIGNIFICANT CHANGE UP (ref 22–31)
CO2 SERPL-SCNC: 25 MMOL/L — SIGNIFICANT CHANGE UP (ref 22–31)
CO2 SERPL-SCNC: 26 MMOL/L — SIGNIFICANT CHANGE UP (ref 22–31)
CO2 SERPL-SCNC: 27 MMOL/L — SIGNIFICANT CHANGE UP (ref 22–31)
COLOR SPEC: ABNORMAL
CORTIS AM PEAK SERPL-MCNC: SIGNIFICANT CHANGE UP UG/DL (ref 6–18.4)
CREAT ?TM UR-MCNC: <4 MG/DL — SIGNIFICANT CHANGE UP
CREAT SERPL-MCNC: 10.11 MG/DL — HIGH (ref 0.5–1.3)
CREAT SERPL-MCNC: 10.68 MG/DL — HIGH (ref 0.5–1.3)
CREAT SERPL-MCNC: 10.79 MG/DL — HIGH (ref 0.5–1.3)
CREAT SERPL-MCNC: 5.97 MG/DL — HIGH (ref 0.5–1.3)
CREAT SERPL-MCNC: 6.96 MG/DL — HIGH (ref 0.5–1.3)
CREAT SERPL-MCNC: 7.62 MG/DL — HIGH (ref 0.5–1.3)
CREAT SERPL-MCNC: 7.62 MG/DL — HIGH (ref 0.5–1.3)
CREAT SERPL-MCNC: 8.04 MG/DL — HIGH (ref 0.5–1.3)
CREAT SERPL-MCNC: 8.07 MG/DL — HIGH (ref 0.5–1.3)
CREAT SERPL-MCNC: 8.09 MG/DL — HIGH (ref 0.5–1.3)
CREAT SERPL-MCNC: 8.09 MG/DL — HIGH (ref 0.5–1.3)
CREAT SERPL-MCNC: 8.14 MG/DL — HIGH (ref 0.5–1.3)
CREAT SERPL-MCNC: 8.59 MG/DL — HIGH (ref 0.5–1.3)
CREAT SERPL-MCNC: 8.62 MG/DL — HIGH (ref 0.5–1.3)
CREAT SERPL-MCNC: 9.82 MG/DL — HIGH (ref 0.5–1.3)
CREAT SERPL-MCNC: 9.87 MG/DL — HIGH (ref 0.5–1.3)
CREAT SERPL-MCNC: 9.9 MG/DL — HIGH (ref 0.5–1.3)
CREAT SERPL-MCNC: 9.96 MG/DL — HIGH (ref 0.5–1.3)
CRP SERPL-MCNC: 123.9 MG/L — HIGH
CULTURE RESULTS: NO GROWTH — SIGNIFICANT CHANGE UP
CULTURE RESULTS: SIGNIFICANT CHANGE UP
DIALYSIS INSTRUMENT RESULT - HEPATITIS B SURFACE ANTIGEN: NEGATIVE — SIGNIFICANT CHANGE UP
DIFF PNL FLD: ABNORMAL
EOSINOPHIL # BLD AUTO: 0.04 K/UL — SIGNIFICANT CHANGE UP (ref 0–0.5)
EOSINOPHIL # BLD AUTO: 0.08 K/UL — SIGNIFICANT CHANGE UP (ref 0–0.5)
EOSINOPHIL # BLD AUTO: 0.11 K/UL — SIGNIFICANT CHANGE UP (ref 0–0.5)
EOSINOPHIL # BLD AUTO: 0.13 K/UL — SIGNIFICANT CHANGE UP (ref 0–0.5)
EOSINOPHIL # BLD AUTO: 0.15 K/UL — SIGNIFICANT CHANGE UP (ref 0–0.5)
EOSINOPHIL NFR BLD AUTO: 0.4 % — SIGNIFICANT CHANGE UP (ref 0–6)
EOSINOPHIL NFR BLD AUTO: 0.8 % — SIGNIFICANT CHANGE UP (ref 0–6)
EOSINOPHIL NFR BLD AUTO: 0.8 % — SIGNIFICANT CHANGE UP (ref 0–6)
EOSINOPHIL NFR BLD AUTO: 1.2 % — SIGNIFICANT CHANGE UP (ref 0–6)
EOSINOPHIL NFR BLD AUTO: 1.4 % — SIGNIFICANT CHANGE UP (ref 0–6)
FERRITIN SERPL-MCNC: 102 NG/ML — SIGNIFICANT CHANGE UP (ref 30–400)
FERRITIN SERPL-MCNC: 170 NG/ML — SIGNIFICANT CHANGE UP (ref 30–400)
FIBRINOGEN PPP-MCNC: 515 MG/DL — SIGNIFICANT CHANGE UP (ref 290–520)
GAS PNL BLDV: 121 MMOL/L — LOW (ref 136–145)
GAS PNL BLDV: 122 MMOL/L — LOW (ref 136–145)
GAS PNL BLDV: 123 MMOL/L — LOW (ref 136–145)
GAS PNL BLDV: 125 MMOL/L — LOW (ref 136–145)
GAS PNL BLDV: 131 MMOL/L — LOW (ref 136–145)
GAS PNL BLDV: SIGNIFICANT CHANGE UP
GLUCOSE BLDC GLUCOMTR-MCNC: 102 MG/DL — HIGH (ref 70–99)
GLUCOSE BLDC GLUCOMTR-MCNC: 108 MG/DL — HIGH (ref 70–99)
GLUCOSE BLDC GLUCOMTR-MCNC: 121 MG/DL — HIGH (ref 70–99)
GLUCOSE BLDC GLUCOMTR-MCNC: 122 MG/DL — HIGH (ref 70–99)
GLUCOSE BLDC GLUCOMTR-MCNC: 133 MG/DL — HIGH (ref 70–99)
GLUCOSE BLDC GLUCOMTR-MCNC: 158 MG/DL — HIGH (ref 70–99)
GLUCOSE BLDC GLUCOMTR-MCNC: 184 MG/DL — HIGH (ref 70–99)
GLUCOSE BLDC GLUCOMTR-MCNC: 73 MG/DL — SIGNIFICANT CHANGE UP (ref 70–99)
GLUCOSE BLDC GLUCOMTR-MCNC: 76 MG/DL — SIGNIFICANT CHANGE UP (ref 70–99)
GLUCOSE BLDC GLUCOMTR-MCNC: 84 MG/DL — SIGNIFICANT CHANGE UP (ref 70–99)
GLUCOSE BLDC GLUCOMTR-MCNC: 84 MG/DL — SIGNIFICANT CHANGE UP (ref 70–99)
GLUCOSE BLDC GLUCOMTR-MCNC: 86 MG/DL — SIGNIFICANT CHANGE UP (ref 70–99)
GLUCOSE BLDC GLUCOMTR-MCNC: 91 MG/DL — SIGNIFICANT CHANGE UP (ref 70–99)
GLUCOSE BLDV-MCNC: 100 MG/DL — HIGH (ref 70–99)
GLUCOSE BLDV-MCNC: 116 MG/DL — HIGH (ref 70–99)
GLUCOSE BLDV-MCNC: 145 MG/DL — HIGH (ref 70–99)
GLUCOSE BLDV-MCNC: 237 MG/DL — HIGH (ref 70–99)
GLUCOSE BLDV-MCNC: 85 MG/DL — SIGNIFICANT CHANGE UP (ref 70–99)
GLUCOSE SERPL-MCNC: 100 MG/DL — HIGH (ref 70–99)
GLUCOSE SERPL-MCNC: 103 MG/DL — HIGH (ref 70–99)
GLUCOSE SERPL-MCNC: 107 MG/DL — HIGH (ref 70–99)
GLUCOSE SERPL-MCNC: 108 MG/DL — HIGH (ref 70–99)
GLUCOSE SERPL-MCNC: 109 MG/DL — HIGH (ref 70–99)
GLUCOSE SERPL-MCNC: 115 MG/DL — HIGH (ref 70–99)
GLUCOSE SERPL-MCNC: 121 MG/DL — HIGH (ref 70–99)
GLUCOSE SERPL-MCNC: 137 MG/DL — HIGH (ref 70–99)
GLUCOSE SERPL-MCNC: 137 MG/DL — HIGH (ref 70–99)
GLUCOSE SERPL-MCNC: 157 MG/DL — HIGH (ref 70–99)
GLUCOSE SERPL-MCNC: 242 MG/DL — HIGH (ref 70–99)
GLUCOSE SERPL-MCNC: 76 MG/DL — SIGNIFICANT CHANGE UP (ref 70–99)
GLUCOSE SERPL-MCNC: 80 MG/DL — SIGNIFICANT CHANGE UP (ref 70–99)
GLUCOSE SERPL-MCNC: 82 MG/DL — SIGNIFICANT CHANGE UP (ref 70–99)
GLUCOSE SERPL-MCNC: 86 MG/DL — SIGNIFICANT CHANGE UP (ref 70–99)
GLUCOSE SERPL-MCNC: 96 MG/DL — SIGNIFICANT CHANGE UP (ref 70–99)
GLUCOSE UR QL: ABNORMAL
HBV CORE AB SER-ACNC: SIGNIFICANT CHANGE UP
HBV CORE IGM SER-ACNC: SIGNIFICANT CHANGE UP
HBV SURFACE AB SER-ACNC: <3 MIU/ML — LOW
HBV SURFACE AB SER-ACNC: SIGNIFICANT CHANGE UP
HBV SURFACE AG SER-ACNC: SIGNIFICANT CHANGE UP
HCO3 BLDV-SCNC: 17 MMOL/L — LOW (ref 22–29)
HCO3 BLDV-SCNC: 18 MMOL/L — LOW (ref 22–29)
HCO3 BLDV-SCNC: 19 MMOL/L — LOW (ref 22–29)
HCO3 BLDV-SCNC: 19 MMOL/L — LOW (ref 22–29)
HCO3 BLDV-SCNC: 25 MMOL/L — SIGNIFICANT CHANGE UP (ref 22–29)
HCT VFR BLD CALC: 21.1 % — LOW (ref 39–50)
HCT VFR BLD CALC: 21.4 % — LOW (ref 39–50)
HCT VFR BLD CALC: 21.9 % — LOW (ref 39–50)
HCT VFR BLD CALC: 22.3 % — LOW (ref 39–50)
HCT VFR BLD CALC: 22.4 % — LOW (ref 39–50)
HCT VFR BLD CALC: 22.8 % — LOW (ref 39–50)
HCT VFR BLD CALC: 22.8 % — LOW (ref 39–50)
HCT VFR BLD CALC: 23.5 % — LOW (ref 39–50)
HCT VFR BLD CALC: 23.9 % — LOW (ref 39–50)
HCT VFR BLD CALC: 24.5 % — LOW (ref 39–50)
HCT VFR BLD CALC: 26.3 % — LOW (ref 39–50)
HCT VFR BLD CALC: 26.4 % — LOW (ref 39–50)
HCT VFR BLD CALC: 26.8 % — LOW (ref 39–50)
HCT VFR BLDA CALC: 17 % — CRITICAL LOW (ref 39–51)
HCT VFR BLDA CALC: 17 % — CRITICAL LOW (ref 39–51)
HCT VFR BLDA CALC: 20 % — CRITICAL LOW (ref 39–51)
HCT VFR BLDA CALC: 22 % — LOW (ref 39–51)
HCT VFR BLDA CALC: 25 % — LOW (ref 39–51)
HCV AB S/CO SERPL IA: 0.13 S/CO — SIGNIFICANT CHANGE UP (ref 0–0.99)
HCV AB SERPL-IMP: SIGNIFICANT CHANGE UP
HGB BLD CALC-MCNC: 5.6 G/DL — CRITICAL LOW (ref 13–17)
HGB BLD CALC-MCNC: 5.7 G/DL — CRITICAL LOW (ref 13–17)
HGB BLD CALC-MCNC: 6.5 G/DL — CRITICAL LOW (ref 13–17)
HGB BLD CALC-MCNC: 7.3 G/DL — LOW (ref 13–17)
HGB BLD CALC-MCNC: 8.2 G/DL — LOW (ref 13–17)
HGB BLD-MCNC: 6.5 G/DL — CRITICAL LOW (ref 13–17)
HGB BLD-MCNC: 6.7 G/DL — CRITICAL LOW (ref 13–17)
HGB BLD-MCNC: 6.9 G/DL — CRITICAL LOW (ref 13–17)
HGB BLD-MCNC: 7.1 G/DL — LOW (ref 13–17)
HGB BLD-MCNC: 7.2 G/DL — LOW (ref 13–17)
HGB BLD-MCNC: 7.3 G/DL — LOW (ref 13–17)
HGB BLD-MCNC: 7.3 G/DL — LOW (ref 13–17)
HGB BLD-MCNC: 7.5 G/DL — LOW (ref 13–17)
HGB BLD-MCNC: 7.8 G/DL — LOW (ref 13–17)
HGB BLD-MCNC: 7.8 G/DL — LOW (ref 13–17)
HGB BLD-MCNC: 8.4 G/DL — LOW (ref 13–17)
HGB BLD-MCNC: 8.5 G/DL — LOW (ref 13–17)
HGB BLD-MCNC: 8.5 G/DL — LOW (ref 13–17)
HYALINE CASTS # UR AUTO: SIGNIFICANT CHANGE UP /LPF (ref 0–7)
IANC: 11.1 K/UL — HIGH (ref 1.5–8.5)
IANC: 7.62 K/UL — SIGNIFICANT CHANGE UP (ref 1.5–8.5)
IANC: 8.06 K/UL — SIGNIFICANT CHANGE UP (ref 1.5–8.5)
IANC: 8.23 K/UL — SIGNIFICANT CHANGE UP (ref 1.5–8.5)
IANC: 8.43 K/UL — SIGNIFICANT CHANGE UP (ref 1.5–8.5)
IMM GRANULOCYTES NFR BLD AUTO: 0.6 % — SIGNIFICANT CHANGE UP (ref 0–1.5)
IMM GRANULOCYTES NFR BLD AUTO: 0.6 % — SIGNIFICANT CHANGE UP (ref 0–1.5)
IMM GRANULOCYTES NFR BLD AUTO: 0.7 % — SIGNIFICANT CHANGE UP (ref 0–1.5)
IMM GRANULOCYTES NFR BLD AUTO: 0.9 % — SIGNIFICANT CHANGE UP (ref 0–1.5)
IMM GRANULOCYTES NFR BLD AUTO: 0.9 % — SIGNIFICANT CHANGE UP (ref 0–1.5)
INR BLD: 1.08 RATIO — SIGNIFICANT CHANGE UP (ref 0.88–1.16)
INR BLD: 1.1 RATIO — SIGNIFICANT CHANGE UP (ref 0.88–1.16)
INR BLD: 1.14 RATIO — SIGNIFICANT CHANGE UP (ref 0.88–1.16)
IRON SATN MFR SERPL: 126 UG/DL — SIGNIFICANT CHANGE UP (ref 45–165)
IRON SATN MFR SERPL: 66 % — HIGH (ref 14–50)
KETONES UR-MCNC: NEGATIVE — SIGNIFICANT CHANGE UP
LACTATE BLDV-MCNC: 1.2 MMOL/L — SIGNIFICANT CHANGE UP (ref 0.5–2)
LACTATE BLDV-MCNC: 2.2 MMOL/L — HIGH (ref 0.5–2)
LACTATE BLDV-MCNC: 3.2 MMOL/L — HIGH (ref 0.5–2)
LACTATE BLDV-MCNC: 4 MMOL/L — CRITICAL HIGH (ref 0.5–2)
LACTATE BLDV-MCNC: 4.5 MMOL/L — CRITICAL HIGH (ref 0.5–2)
LDH SERPL L TO P-CCNC: 329 U/L — HIGH (ref 135–225)
LEUKOCYTE ESTERASE UR-ACNC: NEGATIVE — SIGNIFICANT CHANGE UP
LYMPHOCYTES # BLD AUTO: 0.65 K/UL — LOW (ref 1–3.3)
LYMPHOCYTES # BLD AUTO: 0.76 K/UL — LOW (ref 1–3.3)
LYMPHOCYTES # BLD AUTO: 0.81 K/UL — LOW (ref 1–3.3)
LYMPHOCYTES # BLD AUTO: 1.01 K/UL — SIGNIFICANT CHANGE UP (ref 1–3.3)
LYMPHOCYTES # BLD AUTO: 1.04 K/UL — SIGNIFICANT CHANGE UP (ref 1–3.3)
LYMPHOCYTES # BLD AUTO: 10.3 % — LOW (ref 13–44)
LYMPHOCYTES # BLD AUTO: 4.9 % — LOW (ref 13–44)
LYMPHOCYTES # BLD AUTO: 7.3 % — LOW (ref 13–44)
LYMPHOCYTES # BLD AUTO: 7.9 % — LOW (ref 13–44)
LYMPHOCYTES # BLD AUTO: 9.7 % — LOW (ref 13–44)
MAGNESIUM SERPL-MCNC: 2 MG/DL — SIGNIFICANT CHANGE UP (ref 1.6–2.6)
MAGNESIUM SERPL-MCNC: 2.1 MG/DL — SIGNIFICANT CHANGE UP (ref 1.6–2.6)
MAGNESIUM SERPL-MCNC: 2.2 MG/DL — SIGNIFICANT CHANGE UP (ref 1.6–2.6)
MAGNESIUM SERPL-MCNC: 2.3 MG/DL — SIGNIFICANT CHANGE UP (ref 1.6–2.6)
MAGNESIUM SERPL-MCNC: 2.3 MG/DL — SIGNIFICANT CHANGE UP (ref 1.6–2.6)
MAGNESIUM SERPL-MCNC: 2.4 MG/DL — SIGNIFICANT CHANGE UP (ref 1.6–2.6)
MAGNESIUM SERPL-MCNC: 2.4 MG/DL — SIGNIFICANT CHANGE UP (ref 1.6–2.6)
MAGNESIUM SERPL-MCNC: 2.5 MG/DL — SIGNIFICANT CHANGE UP (ref 1.6–2.6)
MCHC RBC-ENTMCNC: 26.3 PG — LOW (ref 27–34)
MCHC RBC-ENTMCNC: 26.9 PG — LOW (ref 27–34)
MCHC RBC-ENTMCNC: 27 PG — SIGNIFICANT CHANGE UP (ref 27–34)
MCHC RBC-ENTMCNC: 27.2 PG — SIGNIFICANT CHANGE UP (ref 27–34)
MCHC RBC-ENTMCNC: 27.2 PG — SIGNIFICANT CHANGE UP (ref 27–34)
MCHC RBC-ENTMCNC: 27.5 PG — SIGNIFICANT CHANGE UP (ref 27–34)
MCHC RBC-ENTMCNC: 27.6 PG — SIGNIFICANT CHANGE UP (ref 27–34)
MCHC RBC-ENTMCNC: 27.7 PG — SIGNIFICANT CHANGE UP (ref 27–34)
MCHC RBC-ENTMCNC: 27.7 PG — SIGNIFICANT CHANGE UP (ref 27–34)
MCHC RBC-ENTMCNC: 27.8 PG — SIGNIFICANT CHANGE UP (ref 27–34)
MCHC RBC-ENTMCNC: 27.9 PG — SIGNIFICANT CHANGE UP (ref 27–34)
MCHC RBC-ENTMCNC: 27.9 PG — SIGNIFICANT CHANGE UP (ref 27–34)
MCHC RBC-ENTMCNC: 28.1 PG — SIGNIFICANT CHANGE UP (ref 27–34)
MCHC RBC-ENTMCNC: 29.9 GM/DL — LOW (ref 32–36)
MCHC RBC-ENTMCNC: 30.8 GM/DL — LOW (ref 32–36)
MCHC RBC-ENTMCNC: 31.3 GM/DL — LOW (ref 32–36)
MCHC RBC-ENTMCNC: 31.4 GM/DL — LOW (ref 32–36)
MCHC RBC-ENTMCNC: 31.6 GM/DL — LOW (ref 32–36)
MCHC RBC-ENTMCNC: 31.8 GM/DL — LOW (ref 32–36)
MCHC RBC-ENTMCNC: 31.8 GM/DL — LOW (ref 32–36)
MCHC RBC-ENTMCNC: 32 GM/DL — SIGNIFICANT CHANGE UP (ref 32–36)
MCHC RBC-ENTMCNC: 32.2 GM/DL — SIGNIFICANT CHANGE UP (ref 32–36)
MCHC RBC-ENTMCNC: 32.2 GM/DL — SIGNIFICANT CHANGE UP (ref 32–36)
MCHC RBC-ENTMCNC: 32.3 GM/DL — SIGNIFICANT CHANGE UP (ref 32–36)
MCHC RBC-ENTMCNC: 33.2 GM/DL — SIGNIFICANT CHANGE UP (ref 32–36)
MCHC RBC-ENTMCNC: 33.3 GM/DL — SIGNIFICANT CHANGE UP (ref 32–36)
MCV RBC AUTO: 83.2 FL — SIGNIFICANT CHANGE UP (ref 80–100)
MCV RBC AUTO: 83.6 FL — SIGNIFICANT CHANGE UP (ref 80–100)
MCV RBC AUTO: 83.6 FL — SIGNIFICANT CHANGE UP (ref 80–100)
MCV RBC AUTO: 83.9 FL — SIGNIFICANT CHANGE UP (ref 80–100)
MCV RBC AUTO: 85.1 FL — SIGNIFICANT CHANGE UP (ref 80–100)
MCV RBC AUTO: 85.4 FL — SIGNIFICANT CHANGE UP (ref 80–100)
MCV RBC AUTO: 86.6 FL — SIGNIFICANT CHANGE UP (ref 80–100)
MCV RBC AUTO: 87.1 FL — SIGNIFICANT CHANGE UP (ref 80–100)
MCV RBC AUTO: 87.1 FL — SIGNIFICANT CHANGE UP (ref 80–100)
MCV RBC AUTO: 87.6 FL — SIGNIFICANT CHANGE UP (ref 80–100)
MCV RBC AUTO: 88 FL — SIGNIFICANT CHANGE UP (ref 80–100)
MCV RBC AUTO: 88.2 FL — SIGNIFICANT CHANGE UP (ref 80–100)
MCV RBC AUTO: 91.1 FL — SIGNIFICANT CHANGE UP (ref 80–100)
MONOCYTES # BLD AUTO: 1.04 K/UL — HIGH (ref 0–0.9)
MONOCYTES # BLD AUTO: 1.06 K/UL — HIGH (ref 0–0.9)
MONOCYTES # BLD AUTO: 1.14 K/UL — HIGH (ref 0–0.9)
MONOCYTES # BLD AUTO: 1.22 K/UL — HIGH (ref 0–0.9)
MONOCYTES # BLD AUTO: 1.27 K/UL — HIGH (ref 0–0.9)
MONOCYTES NFR BLD AUTO: 10 % — SIGNIFICANT CHANGE UP (ref 2–14)
MONOCYTES NFR BLD AUTO: 10.4 % — SIGNIFICANT CHANGE UP (ref 2–14)
MONOCYTES NFR BLD AUTO: 10.9 % — SIGNIFICANT CHANGE UP (ref 2–14)
MONOCYTES NFR BLD AUTO: 12.6 % — SIGNIFICANT CHANGE UP (ref 2–14)
MONOCYTES NFR BLD AUTO: 9.2 % — SIGNIFICANT CHANGE UP (ref 2–14)
MRSA PCR RESULT.: SIGNIFICANT CHANGE UP
NEUTROPHILS # BLD AUTO: 11.1 K/UL — HIGH (ref 1.8–7.4)
NEUTROPHILS # BLD AUTO: 7.62 K/UL — HIGH (ref 1.8–7.4)
NEUTROPHILS # BLD AUTO: 8.06 K/UL — HIGH (ref 1.8–7.4)
NEUTROPHILS # BLD AUTO: 8.23 K/UL — HIGH (ref 1.8–7.4)
NEUTROPHILS # BLD AUTO: 8.43 K/UL — HIGH (ref 1.8–7.4)
NEUTROPHILS NFR BLD AUTO: 75.3 % — SIGNIFICANT CHANGE UP (ref 43–77)
NEUTROPHILS NFR BLD AUTO: 77.2 % — HIGH (ref 43–77)
NEUTROPHILS NFR BLD AUTO: 80.6 % — HIGH (ref 43–77)
NEUTROPHILS NFR BLD AUTO: 80.9 % — HIGH (ref 43–77)
NEUTROPHILS NFR BLD AUTO: 84 % — HIGH (ref 43–77)
NITRITE UR-MCNC: NEGATIVE — SIGNIFICANT CHANGE UP
NRBC # BLD: 0 /100 WBCS — SIGNIFICANT CHANGE UP
NRBC # BLD: 1 /100 WBCS — SIGNIFICANT CHANGE UP
NRBC # FLD: 0 K/UL — SIGNIFICANT CHANGE UP
NRBC # FLD: 0.08 K/UL — HIGH
OSMOLALITY SERPL: 299 MOSM/KG — HIGH (ref 275–295)
OSMOLALITY UR: 275 MOSM/KG — SIGNIFICANT CHANGE UP (ref 50–1200)
PCO2 BLDV: 37 MMHG — LOW (ref 42–55)
PCO2 BLDV: 37 MMHG — LOW (ref 42–55)
PCO2 BLDV: 38 MMHG — LOW (ref 42–55)
PCO2 BLDV: 42 MMHG — SIGNIFICANT CHANGE UP (ref 42–55)
PCO2 BLDV: 45 MMHG — SIGNIFICANT CHANGE UP (ref 42–55)
PH BLDV: 7.23 — LOW (ref 7.32–7.43)
PH BLDV: 7.24 — LOW (ref 7.32–7.43)
PH BLDV: 7.27 — LOW (ref 7.32–7.43)
PH BLDV: 7.32 — SIGNIFICANT CHANGE UP (ref 7.32–7.43)
PH BLDV: 7.42 — SIGNIFICANT CHANGE UP (ref 7.32–7.43)
PH UR: 8.5 — HIGH (ref 5–8)
PHOSPHATE SERPL-MCNC: 3.6 MG/DL — SIGNIFICANT CHANGE UP (ref 2.5–4.5)
PHOSPHATE SERPL-MCNC: 3.7 MG/DL — SIGNIFICANT CHANGE UP (ref 2.5–4.5)
PHOSPHATE SERPL-MCNC: 4.6 MG/DL — HIGH (ref 2.5–4.5)
PHOSPHATE SERPL-MCNC: 4.7 MG/DL — HIGH (ref 2.5–4.5)
PHOSPHATE SERPL-MCNC: 4.7 MG/DL — HIGH (ref 2.5–4.5)
PHOSPHATE SERPL-MCNC: 4.8 MG/DL — HIGH (ref 2.5–4.5)
PHOSPHATE SERPL-MCNC: 4.9 MG/DL — HIGH (ref 2.5–4.5)
PHOSPHATE SERPL-MCNC: 5.2 MG/DL — HIGH (ref 2.5–4.5)
PHOSPHATE SERPL-MCNC: 5.4 MG/DL — HIGH (ref 2.5–4.5)
PHOSPHATE SERPL-MCNC: 5.6 MG/DL — HIGH (ref 2.5–4.5)
PHOSPHATE SERPL-MCNC: 5.7 MG/DL — HIGH (ref 2.5–4.5)
PHOSPHATE SERPL-MCNC: 6 MG/DL — HIGH (ref 2.5–4.5)
PHOSPHATE SERPL-MCNC: 6.2 MG/DL — HIGH (ref 2.5–4.5)
PLATELET # BLD AUTO: 220 K/UL — SIGNIFICANT CHANGE UP (ref 150–400)
PLATELET # BLD AUTO: 230 K/UL — SIGNIFICANT CHANGE UP (ref 150–400)
PLATELET # BLD AUTO: 230 K/UL — SIGNIFICANT CHANGE UP (ref 150–400)
PLATELET # BLD AUTO: 235 K/UL — SIGNIFICANT CHANGE UP (ref 150–400)
PLATELET # BLD AUTO: 238 K/UL — SIGNIFICANT CHANGE UP (ref 150–400)
PLATELET # BLD AUTO: 245 K/UL — SIGNIFICANT CHANGE UP (ref 150–400)
PLATELET # BLD AUTO: 246 K/UL — SIGNIFICANT CHANGE UP (ref 150–400)
PLATELET # BLD AUTO: 247 K/UL — SIGNIFICANT CHANGE UP (ref 150–400)
PLATELET # BLD AUTO: 249 K/UL — SIGNIFICANT CHANGE UP (ref 150–400)
PLATELET # BLD AUTO: 261 K/UL — SIGNIFICANT CHANGE UP (ref 150–400)
PLATELET # BLD AUTO: 269 K/UL — SIGNIFICANT CHANGE UP (ref 150–400)
PLATELET # BLD AUTO: 283 K/UL — SIGNIFICANT CHANGE UP (ref 150–400)
PLATELET # BLD AUTO: 335 K/UL — SIGNIFICANT CHANGE UP (ref 150–400)
PO2 BLDV: 23 MMHG — SIGNIFICANT CHANGE UP
PO2 BLDV: 31 MMHG — SIGNIFICANT CHANGE UP
PO2 BLDV: 33 MMHG — SIGNIFICANT CHANGE UP
PO2 BLDV: 33 MMHG — SIGNIFICANT CHANGE UP
PO2 BLDV: 45 MMHG — SIGNIFICANT CHANGE UP
POTASSIUM BLDV-SCNC: 4.2 MMOL/L — SIGNIFICANT CHANGE UP (ref 3.5–5.1)
POTASSIUM BLDV-SCNC: 6.2 MMOL/L — CRITICAL HIGH (ref 3.5–5.1)
POTASSIUM BLDV-SCNC: 6.4 MMOL/L — CRITICAL HIGH (ref 3.5–5.1)
POTASSIUM BLDV-SCNC: 6.9 MMOL/L — CRITICAL HIGH (ref 3.5–5.1)
POTASSIUM BLDV-SCNC: 7.4 MMOL/L — CRITICAL HIGH (ref 3.5–5.1)
POTASSIUM SERPL-MCNC: 4.1 MMOL/L — SIGNIFICANT CHANGE UP (ref 3.5–5.3)
POTASSIUM SERPL-MCNC: 4.2 MMOL/L — SIGNIFICANT CHANGE UP (ref 3.5–5.3)
POTASSIUM SERPL-MCNC: 4.9 MMOL/L — SIGNIFICANT CHANGE UP (ref 3.5–5.3)
POTASSIUM SERPL-MCNC: 5 MMOL/L — SIGNIFICANT CHANGE UP (ref 3.5–5.3)
POTASSIUM SERPL-MCNC: 5.1 MMOL/L — SIGNIFICANT CHANGE UP (ref 3.5–5.3)
POTASSIUM SERPL-MCNC: 5.2 MMOL/L — SIGNIFICANT CHANGE UP (ref 3.5–5.3)
POTASSIUM SERPL-MCNC: 5.2 MMOL/L — SIGNIFICANT CHANGE UP (ref 3.5–5.3)
POTASSIUM SERPL-MCNC: 5.4 MMOL/L — HIGH (ref 3.5–5.3)
POTASSIUM SERPL-MCNC: 5.5 MMOL/L — HIGH (ref 3.5–5.3)
POTASSIUM SERPL-MCNC: 6.3 MMOL/L — CRITICAL HIGH (ref 3.5–5.3)
POTASSIUM SERPL-MCNC: 6.3 MMOL/L — CRITICAL HIGH (ref 3.5–5.3)
POTASSIUM SERPL-MCNC: 6.4 MMOL/L — CRITICAL HIGH (ref 3.5–5.3)
POTASSIUM SERPL-MCNC: 6.5 MMOL/L — CRITICAL HIGH (ref 3.5–5.3)
POTASSIUM SERPL-MCNC: 6.8 MMOL/L — CRITICAL HIGH (ref 3.5–5.3)
POTASSIUM SERPL-MCNC: 7.1 MMOL/L — CRITICAL HIGH (ref 3.5–5.3)
POTASSIUM SERPL-MCNC: 7.2 MMOL/L — CRITICAL HIGH (ref 3.5–5.3)
POTASSIUM SERPL-SCNC: 4.1 MMOL/L — SIGNIFICANT CHANGE UP (ref 3.5–5.3)
POTASSIUM SERPL-SCNC: 4.2 MMOL/L — SIGNIFICANT CHANGE UP (ref 3.5–5.3)
POTASSIUM SERPL-SCNC: 4.9 MMOL/L — SIGNIFICANT CHANGE UP (ref 3.5–5.3)
POTASSIUM SERPL-SCNC: 5 MMOL/L — SIGNIFICANT CHANGE UP (ref 3.5–5.3)
POTASSIUM SERPL-SCNC: 5.1 MMOL/L — SIGNIFICANT CHANGE UP (ref 3.5–5.3)
POTASSIUM SERPL-SCNC: 5.2 MMOL/L — SIGNIFICANT CHANGE UP (ref 3.5–5.3)
POTASSIUM SERPL-SCNC: 5.2 MMOL/L — SIGNIFICANT CHANGE UP (ref 3.5–5.3)
POTASSIUM SERPL-SCNC: 5.4 MMOL/L — HIGH (ref 3.5–5.3)
POTASSIUM SERPL-SCNC: 5.5 MMOL/L — HIGH (ref 3.5–5.3)
POTASSIUM SERPL-SCNC: 6.3 MMOL/L — CRITICAL HIGH (ref 3.5–5.3)
POTASSIUM SERPL-SCNC: 6.3 MMOL/L — CRITICAL HIGH (ref 3.5–5.3)
POTASSIUM SERPL-SCNC: 6.4 MMOL/L — CRITICAL HIGH (ref 3.5–5.3)
POTASSIUM SERPL-SCNC: 6.5 MMOL/L — CRITICAL HIGH (ref 3.5–5.3)
POTASSIUM SERPL-SCNC: 6.8 MMOL/L — CRITICAL HIGH (ref 3.5–5.3)
POTASSIUM SERPL-SCNC: 7.1 MMOL/L — CRITICAL HIGH (ref 3.5–5.3)
POTASSIUM SERPL-SCNC: 7.2 MMOL/L — CRITICAL HIGH (ref 3.5–5.3)
PROCALCITONIN SERPL-MCNC: 0.49 NG/ML — HIGH (ref 0.02–0.1)
PROCALCITONIN SERPL-MCNC: SIGNIFICANT CHANGE UP NG/ML (ref 0.02–0.1)
PROT SERPL-MCNC: 5.1 G/DL — LOW (ref 6–8.3)
PROT SERPL-MCNC: 5.2 G/DL — LOW (ref 6–8.3)
PROT SERPL-MCNC: 5.4 G/DL — LOW (ref 6–8.3)
PROT SERPL-MCNC: 5.6 G/DL — LOW (ref 6–8.3)
PROT SERPL-MCNC: 5.9 G/DL — LOW (ref 6–8.3)
PROT SERPL-MCNC: 6.1 G/DL — SIGNIFICANT CHANGE UP (ref 6–8.3)
PROT SERPL-MCNC: 6.3 G/DL — SIGNIFICANT CHANGE UP (ref 6–8.3)
PROT SERPL-MCNC: 7.3 G/DL — SIGNIFICANT CHANGE UP (ref 6–8.3)
PROT UR-MCNC: ABNORMAL
PROTHROM AB SERPL-ACNC: 12.4 SEC — SIGNIFICANT CHANGE UP (ref 10.6–13.6)
PROTHROM AB SERPL-ACNC: 12.6 SEC — SIGNIFICANT CHANGE UP (ref 10.6–13.6)
PROTHROM AB SERPL-ACNC: 13 SEC — SIGNIFICANT CHANGE UP (ref 10.6–13.6)
RBC # BLD: 2.46 M/UL — LOW (ref 4.2–5.8)
RBC # BLD: 2.47 M/UL — LOW (ref 4.2–5.8)
RBC # BLD: 2.56 M/UL — LOW (ref 4.2–5.8)
RBC # BLD: 2.56 M/UL — LOW (ref 4.2–5.8)
RBC # BLD: 2.59 M/UL — LOW (ref 4.2–5.8)
RBC # BLD: 2.62 M/UL — LOW (ref 4.2–5.8)
RBC # BLD: 2.68 M/UL — LOW (ref 4.2–5.8)
RBC # BLD: 2.71 M/UL — LOW (ref 4.2–5.8)
RBC # BLD: 2.8 M/UL — LOW (ref 4.2–5.8)
RBC # BLD: 2.83 M/UL — LOW (ref 4.2–5.8)
RBC # BLD: 3.03 M/UL — LOW (ref 4.2–5.8)
RBC # BLD: 3.06 M/UL — LOW (ref 4.2–5.8)
RBC # BLD: 3.16 M/UL — LOW (ref 4.2–5.8)
RBC # FLD: 15 % — HIGH (ref 10.3–14.5)
RBC # FLD: 15.4 % — HIGH (ref 10.3–14.5)
RBC # FLD: 15.6 % — HIGH (ref 10.3–14.5)
RBC # FLD: 15.7 % — HIGH (ref 10.3–14.5)
RBC # FLD: 15.7 % — HIGH (ref 10.3–14.5)
RBC # FLD: 15.9 % — HIGH (ref 10.3–14.5)
RBC # FLD: 15.9 % — HIGH (ref 10.3–14.5)
RBC # FLD: 16 % — HIGH (ref 10.3–14.5)
RBC # FLD: 16.3 % — HIGH (ref 10.3–14.5)
RBC # FLD: 16.4 % — HIGH (ref 10.3–14.5)
RBC # FLD: 16.6 % — HIGH (ref 10.3–14.5)
RBC # FLD: 16.8 % — HIGH (ref 10.3–14.5)
RBC # FLD: 17.1 % — HIGH (ref 10.3–14.5)
RBC CASTS # UR COMP ASSIST: >50 /HPF — SIGNIFICANT CHANGE UP (ref 0–4)
RH IG SCN BLD-IMP: POSITIVE — SIGNIFICANT CHANGE UP
S AUREUS DNA NOSE QL NAA+PROBE: SIGNIFICANT CHANGE UP
SAO2 % BLDV: 37.7 % — SIGNIFICANT CHANGE UP
SAO2 % BLDV: 45.3 % — SIGNIFICANT CHANGE UP
SAO2 % BLDV: 46.9 % — SIGNIFICANT CHANGE UP
SAO2 % BLDV: 54.8 % — SIGNIFICANT CHANGE UP
SAO2 % BLDV: 71.6 % — SIGNIFICANT CHANGE UP
SARS-COV-2 RNA SPEC QL NAA+PROBE: DETECTED
SODIUM SERPL-SCNC: 121 MMOL/L — LOW (ref 135–145)
SODIUM SERPL-SCNC: 122 MMOL/L — LOW (ref 135–145)
SODIUM SERPL-SCNC: 123 MMOL/L — LOW (ref 135–145)
SODIUM SERPL-SCNC: 123 MMOL/L — LOW (ref 135–145)
SODIUM SERPL-SCNC: 127 MMOL/L — LOW (ref 135–145)
SODIUM SERPL-SCNC: 127 MMOL/L — LOW (ref 135–145)
SODIUM SERPL-SCNC: 128 MMOL/L — LOW (ref 135–145)
SODIUM SERPL-SCNC: 130 MMOL/L — LOW (ref 135–145)
SODIUM SERPL-SCNC: 131 MMOL/L — LOW (ref 135–145)
SODIUM SERPL-SCNC: 131 MMOL/L — LOW (ref 135–145)
SODIUM SERPL-SCNC: 132 MMOL/L — LOW (ref 135–145)
SODIUM SERPL-SCNC: 133 MMOL/L — LOW (ref 135–145)
SODIUM SERPL-SCNC: 135 MMOL/L — SIGNIFICANT CHANGE UP (ref 135–145)
SODIUM UR-SCNC: 154 MMOL/L — SIGNIFICANT CHANGE UP
SP GR SPEC: 1.04 — SIGNIFICANT CHANGE UP (ref 1–1.05)
SPECIMEN SOURCE: SIGNIFICANT CHANGE UP
T4 FREE SERPL-MCNC: 0.7 NG/DL — LOW (ref 0.9–1.8)
TIBC SERPL-MCNC: 190 UG/DL — LOW (ref 220–430)
TROPONIN T, HIGH SENSITIVITY RESULT: 53 NG/L — CRITICAL HIGH
TROPONIN T, HIGH SENSITIVITY RESULT: 57 NG/L — CRITICAL HIGH
TROPONIN T, HIGH SENSITIVITY RESULT: 65 NG/L — CRITICAL HIGH
TSH SERPL-MCNC: 1.76 UIU/ML — SIGNIFICANT CHANGE UP (ref 0.27–4.2)
UIBC SERPL-MCNC: 64 UG/DL — LOW (ref 110–370)
UROBILINOGEN FLD QL: SIGNIFICANT CHANGE UP
WBC # BLD: 10.11 K/UL — SIGNIFICANT CHANGE UP (ref 3.8–10.5)
WBC # BLD: 10.21 K/UL — SIGNIFICANT CHANGE UP (ref 3.8–10.5)
WBC # BLD: 10.42 K/UL — SIGNIFICANT CHANGE UP (ref 3.8–10.5)
WBC # BLD: 10.44 K/UL — SIGNIFICANT CHANGE UP (ref 3.8–10.5)
WBC # BLD: 11.02 K/UL — HIGH (ref 3.8–10.5)
WBC # BLD: 11.12 K/UL — HIGH (ref 3.8–10.5)
WBC # BLD: 11.61 K/UL — HIGH (ref 3.8–10.5)
WBC # BLD: 11.68 K/UL — HIGH (ref 3.8–10.5)
WBC # BLD: 12.47 K/UL — HIGH (ref 3.8–10.5)
WBC # BLD: 12.81 K/UL — HIGH (ref 3.8–10.5)
WBC # BLD: 13.22 K/UL — HIGH (ref 3.8–10.5)
WBC # BLD: 5.61 K/UL — SIGNIFICANT CHANGE UP (ref 3.8–10.5)
WBC # BLD: 9.13 K/UL — SIGNIFICANT CHANGE UP (ref 3.8–10.5)
WBC # FLD AUTO: 10.11 K/UL — SIGNIFICANT CHANGE UP (ref 3.8–10.5)
WBC # FLD AUTO: 10.21 K/UL — SIGNIFICANT CHANGE UP (ref 3.8–10.5)
WBC # FLD AUTO: 10.42 K/UL — SIGNIFICANT CHANGE UP (ref 3.8–10.5)
WBC # FLD AUTO: 10.44 K/UL — SIGNIFICANT CHANGE UP (ref 3.8–10.5)
WBC # FLD AUTO: 11.02 K/UL — HIGH (ref 3.8–10.5)
WBC # FLD AUTO: 11.12 K/UL — HIGH (ref 3.8–10.5)
WBC # FLD AUTO: 11.61 K/UL — HIGH (ref 3.8–10.5)
WBC # FLD AUTO: 11.68 K/UL — HIGH (ref 3.8–10.5)
WBC # FLD AUTO: 12.47 K/UL — HIGH (ref 3.8–10.5)
WBC # FLD AUTO: 12.81 K/UL — HIGH (ref 3.8–10.5)
WBC # FLD AUTO: 13.22 K/UL — HIGH (ref 3.8–10.5)
WBC # FLD AUTO: 5.61 K/UL — SIGNIFICANT CHANGE UP (ref 3.8–10.5)
WBC # FLD AUTO: 9.13 K/UL — SIGNIFICANT CHANGE UP (ref 3.8–10.5)
WBC UR QL: >50 /HPF — SIGNIFICANT CHANGE UP (ref 0–5)

## 2022-01-01 PROCEDURE — 93010 ELECTROCARDIOGRAM REPORT: CPT

## 2022-01-01 PROCEDURE — 93308 TTE F-UP OR LMTD: CPT | Mod: 26,GC

## 2022-01-01 PROCEDURE — 71045 X-RAY EXAM CHEST 1 VIEW: CPT | Mod: 26

## 2022-01-01 PROCEDURE — 99233 SBSQ HOSP IP/OBS HIGH 50: CPT | Mod: GC

## 2022-01-01 PROCEDURE — 99232 SBSQ HOSP IP/OBS MODERATE 35: CPT | Mod: GC

## 2022-01-01 PROCEDURE — 76770 US EXAM ABDO BACK WALL COMP: CPT | Mod: 26

## 2022-01-01 PROCEDURE — 99223 1ST HOSP IP/OBS HIGH 75: CPT | Mod: GC

## 2022-01-01 PROCEDURE — 99233 SBSQ HOSP IP/OBS HIGH 50: CPT

## 2022-01-01 PROCEDURE — 99232 SBSQ HOSP IP/OBS MODERATE 35: CPT

## 2022-01-01 PROCEDURE — 74176 CT ABD & PELVIS W/O CONTRAST: CPT | Mod: 26,MA

## 2022-01-01 PROCEDURE — 99291 CRITICAL CARE FIRST HOUR: CPT

## 2022-01-01 PROCEDURE — 93306 TTE W/DOPPLER COMPLETE: CPT | Mod: 26

## 2022-01-01 PROCEDURE — 99291 CRITICAL CARE FIRST HOUR: CPT | Mod: 25

## 2022-01-01 PROCEDURE — 76604 US EXAM CHEST: CPT | Mod: 26,GC

## 2022-01-01 RX ORDER — ALTEPLASE 100 MG
2 KIT INTRAVENOUS ONCE
Refills: 0 | Status: COMPLETED | OUTPATIENT
Start: 2022-01-01 | End: 2022-01-01

## 2022-01-01 RX ORDER — SODIUM CHLORIDE 9 MG/ML
1000 INJECTION, SOLUTION INTRAVENOUS
Refills: 0 | Status: DISCONTINUED | OUTPATIENT
Start: 2022-01-01 | End: 2022-01-01

## 2022-01-01 RX ORDER — CALCIUM GLUCONATE 100 MG/ML
1 VIAL (ML) INTRAVENOUS ONCE
Refills: 0 | Status: COMPLETED | OUTPATIENT
Start: 2022-01-01 | End: 2022-01-01

## 2022-01-01 RX ORDER — ACETAMINOPHEN 500 MG
1000 TABLET ORAL ONCE
Refills: 0 | Status: COMPLETED | OUTPATIENT
Start: 2022-01-01 | End: 2022-01-01

## 2022-01-01 RX ORDER — FUROSEMIDE 40 MG
20 TABLET ORAL ONCE
Refills: 0 | Status: COMPLETED | OUTPATIENT
Start: 2022-01-01 | End: 2022-01-01

## 2022-01-01 RX ORDER — SODIUM CHLORIDE 9 MG/ML
1000 INJECTION, SOLUTION INTRAVENOUS ONCE
Refills: 0 | Status: COMPLETED | OUTPATIENT
Start: 2022-01-01 | End: 2022-01-01

## 2022-01-01 RX ORDER — HYDROMORPHONE HYDROCHLORIDE 2 MG/ML
0.5 INJECTION INTRAMUSCULAR; INTRAVENOUS; SUBCUTANEOUS EVERY 4 HOURS
Refills: 0 | Status: DISCONTINUED | OUTPATIENT
Start: 2022-01-01 | End: 2022-01-01

## 2022-01-01 RX ORDER — INSULIN HUMAN 100 [IU]/ML
10 INJECTION, SOLUTION SUBCUTANEOUS ONCE
Refills: 0 | Status: COMPLETED | OUTPATIENT
Start: 2022-01-01 | End: 2022-01-01

## 2022-01-01 RX ORDER — AMIODARONE HYDROCHLORIDE 400 MG/1
150 TABLET ORAL ONCE
Refills: 0 | Status: COMPLETED | OUTPATIENT
Start: 2022-01-01 | End: 2022-01-01

## 2022-01-01 RX ORDER — DEXTROSE 50 % IN WATER 50 %
50 SYRINGE (ML) INTRAVENOUS ONCE
Refills: 0 | Status: COMPLETED | OUTPATIENT
Start: 2022-01-01 | End: 2022-01-01

## 2022-01-01 RX ORDER — MIDODRINE HYDROCHLORIDE 2.5 MG/1
20 TABLET ORAL ONCE
Refills: 0 | Status: COMPLETED | OUTPATIENT
Start: 2022-01-01 | End: 2022-01-01

## 2022-01-01 RX ORDER — CHLORHEXIDINE GLUCONATE 213 G/1000ML
1 SOLUTION TOPICAL DAILY
Refills: 0 | Status: DISCONTINUED | OUTPATIENT
Start: 2022-01-01 | End: 2022-01-01

## 2022-01-01 RX ORDER — ALBUTEROL 90 UG/1
10 AEROSOL, METERED ORAL ONCE
Refills: 0 | Status: COMPLETED | OUTPATIENT
Start: 2022-01-01 | End: 2022-01-01

## 2022-01-01 RX ORDER — SODIUM ZIRCONIUM CYCLOSILICATE 10 G/10G
5 POWDER, FOR SUSPENSION ORAL ONCE
Refills: 0 | Status: COMPLETED | OUTPATIENT
Start: 2022-01-01 | End: 2022-01-01

## 2022-01-01 RX ORDER — INSULIN HUMAN 100 [IU]/ML
5 INJECTION, SOLUTION SUBCUTANEOUS ONCE
Refills: 0 | Status: COMPLETED | OUTPATIENT
Start: 2022-01-01 | End: 2022-01-01

## 2022-01-01 RX ORDER — SODIUM ZIRCONIUM CYCLOSILICATE 10 G/10G
10 POWDER, FOR SUSPENSION ORAL ONCE
Refills: 0 | Status: COMPLETED | OUTPATIENT
Start: 2022-01-01 | End: 2022-01-01

## 2022-01-01 RX ORDER — METOPROLOL TARTRATE 50 MG
12.5 TABLET ORAL EVERY 12 HOURS
Refills: 0 | Status: DISCONTINUED | OUTPATIENT
Start: 2022-01-01 | End: 2022-01-01

## 2022-01-01 RX ORDER — SODIUM ZIRCONIUM CYCLOSILICATE 10 G/10G
10 POWDER, FOR SUSPENSION ORAL ONCE
Refills: 0 | Status: DISCONTINUED | OUTPATIENT
Start: 2022-01-01 | End: 2022-01-01

## 2022-01-01 RX ORDER — INFLUENZA VIRUS VACCINE 15; 15; 15; 15 UG/.5ML; UG/.5ML; UG/.5ML; UG/.5ML
0.7 SUSPENSION INTRAMUSCULAR ONCE
Refills: 0 | Status: DISCONTINUED | OUTPATIENT
Start: 2022-01-01 | End: 2022-01-01

## 2022-01-01 RX ORDER — SODIUM CHLORIDE 9 MG/ML
500 INJECTION, SOLUTION INTRAVENOUS ONCE
Refills: 0 | Status: COMPLETED | OUTPATIENT
Start: 2022-01-01 | End: 2022-01-01

## 2022-01-01 RX ORDER — MORPHINE SULFATE 50 MG/1
1 CAPSULE, EXTENDED RELEASE ORAL ONCE
Refills: 0 | Status: DISCONTINUED | OUTPATIENT
Start: 2022-01-01 | End: 2022-01-01

## 2022-01-01 RX ORDER — INSULIN HUMAN 100 [IU]/ML
10 INJECTION, SOLUTION SUBCUTANEOUS ONCE
Refills: 0 | Status: DISCONTINUED | OUTPATIENT
Start: 2022-01-01 | End: 2022-01-01

## 2022-01-01 RX ORDER — SODIUM BICARBONATE 1 MEQ/ML
50 SYRINGE (ML) INTRAVENOUS ONCE
Refills: 0 | Status: COMPLETED | OUTPATIENT
Start: 2022-01-01 | End: 2022-01-01

## 2022-01-01 RX ORDER — CALCIUM CHLORIDE
500 POWDER (GRAM) MISCELLANEOUS ONCE
Refills: 0 | Status: DISCONTINUED | OUTPATIENT
Start: 2022-01-01 | End: 2022-01-01

## 2022-01-01 RX ORDER — ACETAMINOPHEN 500 MG
650 TABLET ORAL EVERY 6 HOURS
Refills: 0 | Status: DISCONTINUED | OUTPATIENT
Start: 2022-01-01 | End: 2022-01-01

## 2022-01-01 RX ORDER — LACTULOSE 10 G/15ML
10 SOLUTION ORAL
Refills: 0 | Status: DISCONTINUED | OUTPATIENT
Start: 2022-01-01 | End: 2022-01-01

## 2022-01-01 RX ORDER — CALCIUM GLUCONATE 100 MG/ML
500 VIAL (ML) INTRAVENOUS ONCE
Refills: 0 | Status: DISCONTINUED | OUTPATIENT
Start: 2022-01-01 | End: 2022-01-01

## 2022-01-01 RX ORDER — SODIUM POLYSTYRENE SULFONATE 4.1 MEQ/G
30 POWDER, FOR SUSPENSION ORAL ONCE
Refills: 0 | Status: COMPLETED | OUTPATIENT
Start: 2022-01-01 | End: 2022-01-01

## 2022-01-01 RX ORDER — SODIUM CHLORIDE 9 MG/ML
500 INJECTION, SOLUTION INTRAVENOUS ONCE
Refills: 0 | Status: DISCONTINUED | OUTPATIENT
Start: 2022-01-01 | End: 2022-01-01

## 2022-01-01 RX ORDER — ALBUTEROL 90 UG/1
10 AEROSOL, METERED ORAL ONCE
Refills: 0 | Status: DISCONTINUED | OUTPATIENT
Start: 2022-01-01 | End: 2022-01-01

## 2022-01-01 RX ADMIN — SODIUM ZIRCONIUM CYCLOSILICATE 10 GRAM(S): 10 POWDER, FOR SUSPENSION ORAL at 20:01

## 2022-01-01 RX ADMIN — LACTULOSE 10 GRAM(S): 10 SOLUTION ORAL at 17:36

## 2022-01-01 RX ADMIN — INSULIN HUMAN 10 UNIT(S): 100 INJECTION, SOLUTION SUBCUTANEOUS at 03:30

## 2022-01-01 RX ADMIN — LACTULOSE 10 GRAM(S): 10 SOLUTION ORAL at 05:38

## 2022-01-01 RX ADMIN — Medication 50 MILLILITER(S): at 06:23

## 2022-01-01 RX ADMIN — Medication 50 MILLILITER(S): at 10:12

## 2022-01-01 RX ADMIN — SODIUM CHLORIDE 75 MILLILITER(S): 9 INJECTION, SOLUTION INTRAVENOUS at 10:12

## 2022-01-01 RX ADMIN — CHLORHEXIDINE GLUCONATE 1 APPLICATION(S): 213 SOLUTION TOPICAL at 19:45

## 2022-01-01 RX ADMIN — LACTULOSE 10 GRAM(S): 10 SOLUTION ORAL at 07:01

## 2022-01-01 RX ADMIN — LACTULOSE 10 GRAM(S): 10 SOLUTION ORAL at 17:38

## 2022-01-01 RX ADMIN — SODIUM CHLORIDE 1000 MILLILITER(S): 9 INJECTION, SOLUTION INTRAVENOUS at 09:58

## 2022-01-01 RX ADMIN — LACTULOSE 10 GRAM(S): 10 SOLUTION ORAL at 17:26

## 2022-01-01 RX ADMIN — MORPHINE SULFATE 1 MILLIGRAM(S): 50 CAPSULE, EXTENDED RELEASE ORAL at 12:49

## 2022-01-01 RX ADMIN — LACTULOSE 10 GRAM(S): 10 SOLUTION ORAL at 06:21

## 2022-01-01 RX ADMIN — LACTULOSE 10 GRAM(S): 10 SOLUTION ORAL at 05:02

## 2022-01-01 RX ADMIN — ALBUTEROL 10 MILLIGRAM(S): 90 AEROSOL, METERED ORAL at 12:29

## 2022-01-01 RX ADMIN — SODIUM ZIRCONIUM CYCLOSILICATE 5 GRAM(S): 10 POWDER, FOR SUSPENSION ORAL at 11:48

## 2022-01-01 RX ADMIN — LACTULOSE 10 GRAM(S): 10 SOLUTION ORAL at 12:29

## 2022-01-01 RX ADMIN — CHLORHEXIDINE GLUCONATE 1 APPLICATION(S): 213 SOLUTION TOPICAL at 11:26

## 2022-01-01 RX ADMIN — CHLORHEXIDINE GLUCONATE 1 APPLICATION(S): 213 SOLUTION TOPICAL at 12:04

## 2022-01-01 RX ADMIN — SODIUM CHLORIDE 1000 MILLILITER(S): 9 INJECTION, SOLUTION INTRAVENOUS at 09:21

## 2022-01-01 RX ADMIN — Medication 50 MILLILITER(S): at 02:00

## 2022-01-01 RX ADMIN — ALBUTEROL 10 MILLIGRAM(S): 90 AEROSOL, METERED ORAL at 02:00

## 2022-01-01 RX ADMIN — Medication 12.5 MILLIGRAM(S): at 17:46

## 2022-01-01 RX ADMIN — CHLORHEXIDINE GLUCONATE 1 APPLICATION(S): 213 SOLUTION TOPICAL at 11:04

## 2022-01-01 RX ADMIN — INSULIN HUMAN 5 UNIT(S): 100 INJECTION, SOLUTION SUBCUTANEOUS at 20:51

## 2022-01-01 RX ADMIN — AMIODARONE HYDROCHLORIDE 618 MILLIGRAM(S): 400 TABLET ORAL at 11:30

## 2022-01-01 RX ADMIN — SODIUM POLYSTYRENE SULFONATE 30 GRAM(S): 4.1 POWDER, FOR SUSPENSION ORAL at 12:30

## 2022-01-01 RX ADMIN — MIDODRINE HYDROCHLORIDE 20 MILLIGRAM(S): 2.5 TABLET ORAL at 15:50

## 2022-01-01 RX ADMIN — SODIUM CHLORIDE 1500 MILLILITER(S): 9 INJECTION, SOLUTION INTRAVENOUS at 09:53

## 2022-01-01 RX ADMIN — ALTEPLASE 2 MILLIGRAM(S): KIT at 22:13

## 2022-01-01 RX ADMIN — Medication 100 GRAM(S): at 19:55

## 2022-01-01 RX ADMIN — SODIUM ZIRCONIUM CYCLOSILICATE 10 GRAM(S): 10 POWDER, FOR SUSPENSION ORAL at 03:33

## 2022-01-01 RX ADMIN — SODIUM ZIRCONIUM CYCLOSILICATE 5 GRAM(S): 10 POWDER, FOR SUSPENSION ORAL at 12:50

## 2022-01-01 RX ADMIN — Medication 400 MILLIGRAM(S): at 02:35

## 2022-01-01 RX ADMIN — LACTULOSE 10 GRAM(S): 10 SOLUTION ORAL at 06:50

## 2022-01-01 RX ADMIN — LACTULOSE 10 GRAM(S): 10 SOLUTION ORAL at 18:11

## 2022-01-01 RX ADMIN — LACTULOSE 10 GRAM(S): 10 SOLUTION ORAL at 06:07

## 2022-01-01 RX ADMIN — INSULIN HUMAN 5 UNIT(S): 100 INJECTION, SOLUTION SUBCUTANEOUS at 06:53

## 2022-01-01 RX ADMIN — CHLORHEXIDINE GLUCONATE 1 APPLICATION(S): 213 SOLUTION TOPICAL at 11:57

## 2022-01-01 RX ADMIN — ALBUTEROL 10 MILLIGRAM(S): 90 AEROSOL, METERED ORAL at 03:33

## 2022-01-01 RX ADMIN — INSULIN HUMAN 5 UNIT(S): 100 INJECTION, SOLUTION SUBCUTANEOUS at 10:37

## 2022-01-01 RX ADMIN — CHLORHEXIDINE GLUCONATE 1 APPLICATION(S): 213 SOLUTION TOPICAL at 11:12

## 2022-01-01 RX ADMIN — AMIODARONE HYDROCHLORIDE 618 MILLIGRAM(S): 400 TABLET ORAL at 03:04

## 2022-01-01 RX ADMIN — Medication 20 MILLIGRAM(S): at 02:00

## 2022-01-01 RX ADMIN — LACTULOSE 10 GRAM(S): 10 SOLUTION ORAL at 17:47

## 2022-01-01 RX ADMIN — Medication 50 MILLILITER(S): at 03:33

## 2022-01-01 RX ADMIN — ALBUTEROL 10 MILLIGRAM(S): 90 AEROSOL, METERED ORAL at 06:30

## 2022-01-01 RX ADMIN — SODIUM CHLORIDE 1000 MILLILITER(S): 9 INJECTION, SOLUTION INTRAVENOUS at 09:41

## 2022-01-01 RX ADMIN — INSULIN HUMAN 5 UNIT(S): 100 INJECTION, SOLUTION SUBCUTANEOUS at 03:14

## 2022-01-01 RX ADMIN — LACTULOSE 10 GRAM(S): 10 SOLUTION ORAL at 19:46

## 2022-01-01 RX ADMIN — CHLORHEXIDINE GLUCONATE 1 APPLICATION(S): 213 SOLUTION TOPICAL at 11:34

## 2022-01-01 RX ADMIN — Medication 50 MILLIEQUIVALENT(S): at 08:29

## 2022-01-01 RX ADMIN — SODIUM CHLORIDE 1000 MILLILITER(S): 9 INJECTION, SOLUTION INTRAVENOUS at 03:04

## 2022-01-01 RX ADMIN — Medication 50 MILLILITER(S): at 20:53

## 2022-01-01 RX ADMIN — LACTULOSE 10 GRAM(S): 10 SOLUTION ORAL at 06:45

## 2022-01-01 RX ADMIN — Medication 100 GRAM(S): at 08:30

## 2022-01-01 RX ADMIN — SODIUM ZIRCONIUM CYCLOSILICATE 10 GRAM(S): 10 POWDER, FOR SUSPENSION ORAL at 10:40

## 2022-01-01 RX ADMIN — Medication 650 MILLIGRAM(S): at 02:22

## 2022-01-19 NOTE — ED PROVIDER NOTE - CLINICAL SUMMARY MEDICAL DECISION MAKING FREE TEXT BOX
89 y/o man w hx cystoscopy october 28 2021 with tumour removal(benign) hematuria has been getting worse since october. He lives at home with son, has had low appetite. Likely symptomatic anemia, pending labs 89 y/o man w hx cystoscopy october 28 2021 with tumour removal(benign) hematuria has been getting worse since october, presenting with fatigue/weakness. Likely symptomatic anemia, pending labs. 89 y/o man w hx cystoscopy october 28 2021 with tumour removal(benign) hematuria has been getting worse since october, presenting with fatigue/weakness. Likely symptomatic anemia as hb 6.5, receiving blood transfusions. Cr elevated, hyperkalemia. 89 y/o man w hx cystoscopy october 28 2021 with tumour removal(benign) hematuria has been getting worse since october, presenting with fatigue/weakness. Likely symptomatic anemia as hb 6.5, receiving blood transfusions. Cr elevated, hyperkalemia. Found to have blood clot obstruction in urinary tract, urology intervention pending.

## 2022-01-19 NOTE — ED PROVIDER NOTE - CARE PLAN
1 Principal Discharge DX:	Hydronephrosis   Principal Discharge DX:	Hyperkalemia   Principal Discharge DX:	Hyperkalemia  Secondary Diagnosis:	Gross hematuria  Secondary Diagnosis:	Renal failure  Secondary Diagnosis:	Anemia due to acute blood loss

## 2022-01-19 NOTE — ED ADULT NURSE NOTE - OBJECTIVE STATEMENT
Pt received to RM 21: A&Ox3, c/o worsening hematuria, weakness, and nausea x 1 week. Pt recently had a cytoscopy in 10/21 with benign tumor removal. PMHx of HTN and anemia (takes iron). Respirations are even and unlabored, sating at 100% on RA, NSR on cardiac monitor. One attempt was made by primary RN for IV and was unsuccessful as well as one more attempt from float RN and still unsuccessful. MD made aware for the need of ultrasound guided IV. An 18 F curved coude placed with Eloisa BOLES while maintaining sterility. Pt tolerated procedure well and drained 30 mL of bright red urine with clots.

## 2022-01-19 NOTE — ED PROVIDER NOTE - OBJECTIVE STATEMENT
89 y/o man w hx cystoscopy october 28 2021 with tumour removal(benign) hematuria has been getting worse since october. He lives at home with son, has had low appetite. Per family at bedside, he is alert and oriented x3 and ambulates at baseline. Currently not altered. He is on omeprazole, iron supplement, "blood pressure medication." No other medical hx/medications. Not on anticoagulation. 89 y/o man w hx cystoscopy october 28 2021 with tumour removal(benign) hematuria has been getting worse since october in quantity. He lives at home with son, has had low appetite. Per family at bedside, he is alert and oriented x3 and ambulates at baseline. Currently not altered. He is on omeprazole, iron supplement, "blood pressure medication." No other medical hx/medications. Not on anticoagulation. 89 y/o man w hx cystoscopy october 28 2021 with tumour removal(benign) hematuria has been getting worse since october in quantity. He lives at home with son, has had low appetite. Per niece at bedside, he is alert and oriented x3 and ambulates at baseline. Currently not altered. He is on omeprazole, iron supplement, "blood pressure medication." No other medical hx/medications. Not on anticoagulation.

## 2022-01-19 NOTE — ED PROVIDER NOTE - PROGRESS NOTE DETAILS
Cr elevated, hyperkalemia. Given insulin and dextrose, repeat BMP pending. Jesus HANNAH: Pt signed out to me.  Initial labs reveal anemia, KOLBY with Cr of 9 and hyperkalemia (no old labs for comparison).  No EKG changes of hyperkalemia, pt given Ca with albuterol, lasix, insulin, and dextrose.  Repeat labs show no change in renal function and hyperkalemia.  He cont to be stable, no EKG changes and no complaints.  Renal consulted for KOLBY and hyperkalemia, pt re-dosed with additional ca, insulin, dex, albuterol, lokalma.  Pt noted to have minimal uop since initial mendez placement <20cc with red urine, no noted clots.  Bedside us showing bilat hydronephrosis, stat CT scan done showing large amount of blood clot in bladder with associated bladder distension and hydroureter.  Urology consulted for obstructive uropathy.  Pt and niece at bedside, daughter over the phone updated on kidney failure, hyperkalemia, urinary obstruction.  Discussed current medical management, potential for dialysis, to discuss with pt and other family.    SHABNAM Ware ([facility]) Jesus HANNAH: Pt seen by urology - they were able to pull approx 200cc clot from bladder, started on CBI.  Repeat K 6.3, no EKG changes of hyperK - will give additional hyperK meds, repeat bmp, pending final  recs. Jasiel, PGY3: pt signed out to me pending repeat labs. stable no new complaints

## 2022-01-19 NOTE — ED PROVIDER NOTE - ATTENDING CONTRIBUTION TO CARE
89M p/w persistent hematuria, apparently since he had cystoscopy in october and had tumor removed.  Hematuria now worse.  Thick red dribbles in urinal at bedside.  Stool dark as well, seen in toilet, dark but pt takes iron.  Pt also with gen weakness.  Took Po today.  Unvaccinated vs covid.  Chronic productive cough.  Appears pale.  HD stable.  Plan check labs, covid, CXR, mendez cath.  Possible urology consult if thick blood in mendez.  Likely blood transfusion and admission.  VS:  unremarkable    GEN - malaise, pallor, occ productive cough;   A+O x3   HEAD - NC/AT     ENT - PEERL, EOMI, mucous membranes  dry, no discharge      NECK: Neck supple, non-tender without lymphadenopathy, no masses, no JVD  PULM - CTA b/l,  symmetric breath sounds  COR -  normal heart sounds    ABD - , ND, NT, soft,  BACK - no CVA tenderness, nontender spine     EXTREMS - no edema, no deformity, warm and well perfused .  LLE in brace (+)venous stasis change   SKIN - no rash    or bruising      NEUROLOGIC - alert, face symmetric, speech fluent, sensation nl, motor no focal deficit. 89M p/w persistent hematuria, apparently since he had cystoscopy in october and had tumor removed.  Hematuria now worse.  Thick red dribbles in urinal at bedside.  Stool dark as well, seen in toilet, dark but pt takes iron.  Pt also with gen weakness.  Took Po today.  Unvaccinated vs covid.  Chronic productive cough.  Appears pale.  HD stable.  Plan check labs, covid, CXR, mendez cath.  Possible urology consult if thick blood in mendez.  Likely blood transfusion and admission.  VS:  unremarkable    GEN - malaise, pallor, occ productive cough;   A+O x3   HEAD - NC/AT     ENT - PEERL, EOMI, mucous membranes  dry, no discharge      NECK: Neck supple, non-tender without lymphadenopathy, no masses, no JVD  PULM - CTA b/l,  symmetric breath sounds  COR -  normal heart sounds    ABD - , ND, NT, soft,  BACK - no CVA tenderness, nontender spine     EXTREMS - no edema, no deformity, warm and well perfused .  LLE in brace (+)venous stasis change   SKIN - no rash    or bruising      NEUROLOGIC - alert, face symmetric, speech fluent, sensation nl, motor no focal deficit.    Upon my evaluation, this patient had a high probability of imminent or life-threatening deterioration due to hematuria, bladder obstruction, anemia, hyperkalemia, acute renal failure which required my direct attention, intervention, and personal management.  The patient has a  medical condition that impairs one or more vital organ systems.  Frequent personal assessment and adjustment of medical interventions was performed.      I have personally provided 60 minutes of critical care time exclusive of time spent on separately billable procedures. Time includes review of laboratory data, radiology results, discussion with consultants, patient and family; monitoring for potential decompensation, as well as time spent retrieving data and reviewing the chart and documenting the visit. Interventions were performed as documented above.

## 2022-01-19 NOTE — ED ADULT NURSE NOTE - NSIMPLEMENTINTERV_GEN_ALL_ED
Implemented All Fall with Harm Risk Interventions:  [place] to call system. Call bell, personal items and telephone within reach. Instruct patient to call for assistance. Room bathroom lighting operational. Non-slip footwear when patient is off stretcher. Physically safe environment: no spills, clutter or unnecessary equipment. Stretcher in lowest position, wheels locked, appropriate side rails in place. Provide visual cue, wrist band, yellow gown, etc. Monitor gait and stability. Monitor for mental status changes and reorient to person, place, and time. Review medications for side effects contributing to fall risk. Reinforce activity limits and safety measures with patient and family. Provide visual clues: red socks.

## 2022-01-19 NOTE — ED ADULT TRIAGE NOTE - CHIEF COMPLAINT QUOTE
had cystoscopy october 28 2021 with tumour removal(benign) hematuria has been getting worse in the last week was told by MD pt has low H/H  pt has not been eating c/o some nausea

## 2022-01-19 NOTE — ED PROVIDER NOTE - PHYSICAL EXAMINATION
PHYSICAL EXAM:  GENERAL: lying down, appears comfortable, on RA  HEAD:  Atraumatic, Normocephalic  EYES: EOMI, PERRLA, conjunctiva and sclera clear  ENT: dry mucus membranes  CHEST/LUNG: mild wheeze b/l anteriorly, no crackles   HEART: Regular rate and rhythm; No murmurs, rubs, or gallops  ABDOMEN:  EXTREMITIES:  left leg in brace, no edema  NERVOUS SYSTEM:  alert and oriented, responds briefly coherently

## 2022-01-20 NOTE — H&P ADULT - PROBLEM SELECTOR PLAN 5
-hematuria for 2 weeks, s/p cystoscopy + tumor removal in October  -Leija placed by urology, recommend CBI

## 2022-01-20 NOTE — PROCEDURE NOTE - NSPOSTPRCRAD_GEN_A_CORE
Post-Procedure radiography pending/line adjusted to depth of insertion central line located in the superior vena cava/line adjusted to depth of insertion/no pneumothorax/post-procedure radiography performed

## 2022-01-20 NOTE — PATIENT PROFILE ADULT - FALL HARM RISK - HARM RISK INTERVENTIONS

## 2022-01-20 NOTE — H&P ADULT - HISTORY OF PRESENT ILLNESS
Mr. Aguilar is a 89YOM with PMH of prostate Ca s/p XRT Mr. Aguilar is a 89YOM with PMH of HTN and previous cystoscopy on 10/28/2021 Mr. Aguilar is a 89YOM with PMH of HTN, prostate cancer s/p XRT, and previous cystoscopy with bladder tumor removal on 10/28/2021 at [facility] presents with 2 week history of hematuria, malaise, and lethargy. Per patient's niece, the patient had recovered well from the cystoscopy in October but was known to have persistent anemia after the procedure. She reports that he was otherwise well until appx. 2 weeks ago when he began to have hematuria and increased urinary frequency.   Mr. Aguilar is a 89YOM with PMH of HTN, prostate cancer s/p XRT, and previous cystoscopy with bladder tumor removal on 10/28/2021 at [facility] presents with 2 week history of hematuria, malaise, and lethargy. Per patient's niece, the patient had recovered well from the cystoscopy in October but was known to have persistent anemia after the procedure. She reports that he was otherwise well, ambulating independently and A&Ox3 until appx. 2 weeks ago when he began to have hematuria, increased urinary frequency, and was witnessed to be more lethargic and confused at which point he was brought to the ED.    In the ED the patient was found to have anemia with Hgb of 6.5, K of 7.2, and Cr of 9.87. Lactate was initially elevated at 2.2 but uptrended to 4.0. He was given a unit of PRBC to address the anemia. EKG showed no acute changes of hyperkalemia and he received calcium gluconate, albuterol, lasix, insulin, and dextrose without change in K level, but patient remained stable without complaints. Renal was consulted for the patient's hyperkalemia and KOLBY and was re-dosed with calcium, lokelma, insulin, dextrose, and albuterol. Ultrasound was conducted for low UOP that showed bilateral hydronephrosis and substantial blood clot in bladder with associated distention + hydroureter. Urology was consulted and were able to extract 200cc clot from the bladder and start CBI. Mr. Aguilar is a 89YOM with PMH of HTN, prostate cancer s/p XRT, and [facility] presents with 2 week history of hematuria, malaise, and lethargy. Per patient's niece, the patient had recovered well from the cystoscopy in October but was known to have persistent anemia after the procedure. She reports that he was otherwise well, ambulating independently and A&Ox3 until appx. 2 weeks ago when he began to have hematuria, increased urinary frequency, and was witnessed to be more lethargic and confused at which point he was brought to the ED. Patient defers medical decision making to niece at this time who wishes for patient to remain full code.    In the ED the patient was found to have anemia with Hgb of 6.5, K of 7.2, and Cr of 9.87. Lactate was initially elevated at 2.2 but uptrended to 4.0. He was given a unit of PRBC to address the anemia. EKG showed no acute changes of hyperkalemia and he received calcium gluconate, albuterol, lasix, insulin, and dextrose without change in K level, but patient remained stable without complaints. Renal was consulted for the patient's hyperkalemia and KOLBY and was re-dosed with calcium, lokelma, insulin, dextrose, and albuterol. Ultrasound was conducted for low UOP that showed bilateral hydronephrosis and substantial blood clot in bladder with associated distention + hydroureter. Urology was consulted and were able to extract 200cc clot from the bladder and start CBI.

## 2022-01-20 NOTE — ED ADULT NURSE REASSESSMENT NOTE - NS ED NURSE REASSESS COMMENT FT1
Pt received AOx4, ambulatory at baseline w. assistance. 20G noted to LAC, IV line clear and patent. Pt on continuous bladder irrigation. Family by the bedside. Safety precautions maintained. Pt on the cardiac monitor, reading sinus tachy. Breathing even and unlabored, saturating 100% on RA. Donor Site Anesthesia Type: same as repair anesthesia

## 2022-01-20 NOTE — H&P ADULT - NSHPREVIEWOFSYSTEMS_GEN_ALL_CORE

## 2022-01-20 NOTE — CONSULT NOTE ADULT - PROBLEM SELECTOR RECOMMENDATION 3
In the setting of severe obstruction and bladder distention with inappropriate ADH release. SNa 122 on admission. Currently asymptomatic. Will likely improve with relieving obstruction. Send UA, UOsm, Femi and SOsm with next set of labs. Monitor SNa. Goal 128-130 over the next 24 hours.    Overall prognosis extremely guarded. Prefer extended GOC discussion by primary team.    Fahad Lowe  Nephrology Fellow  [phone]  (After 5pm or on weekends please page the on-call fellow) In the setting of severe obstruction and bladder distention serving as stimulus for inappropriate ADH release. SNa 122 on admission. Currently asymptomatic. Will likely improve with relieving obstruction. Send UA, UOsm, Femi and SOsm with next set of labs. Monitor SNa. Goal 128-130 over the next 24 hours.    Overall prognosis extremely guarded. Prefer extended GOC discussion by primary team.    Fahad Lowe  Nephrology Fellow  [phone]  (After 5pm or on weekends please page the on-call fellow)

## 2022-01-20 NOTE — CONSULT NOTE ADULT - PROBLEM SELECTOR RECOMMENDATION 9
Pt. with renal failure in the setting of obstructive uropathy / moderate to severe B/L hydronephrosis with a secondary component of acute blood loss anemia. No prior history of kidney failure. Scr WNL in 2/2019. Scr on admission elevated at 9.87. Reviewed CT scan findings with clot burden and bladder distention. Pt. with intermittent hematuria since cystoscopy and mass resection in 10/2021. Pt. now with ? septic shock given rising lactic acidosis. Pt. also with severe hyperkalemia. Urology present at bedside at the time of evaluation. Discussed risks and benefits of RRT/HD with patient and niece at bedside at length. If hyperkalemia fails to resolve, pt. would like to consider HD despite risks. Dialysis would be unsafe at present. Prefer stable hemodynamics and Hb > 7.5 prior to HD if needed. Management for hyperkalemia and hyponatremia as outlined above. Strongly recommend establishing GOC.
-Continue mendez  -Continue CBI, monitor color, wean as able   -transfuse prn  -Urology to manage CBI, pager [phone]

## 2022-01-20 NOTE — ED ADULT NURSE REASSESSMENT NOTE - NS ED NURSE REASSESS COMMENT FT1
MD Carpio made aware pt only has 1US[facility]IV in [facility], okay to administer calcium gluconate and D5 infusion after blood transfusion.

## 2022-01-20 NOTE — ED ADULT NURSE REASSESSMENT NOTE - NS ED NURSE REASSESS COMMENT FT1
Pt awake and alert, respirations are even and unlabored, sating at 100% on RA, sinus tachycardia on monitor, and in no acute distress at this time. 2000 mL of pink urine was drained from mendez.

## 2022-01-20 NOTE — H&P ADULT - PROBLEM SELECTOR PLAN 4
-Hgb 6.5 on admission, 6.9 after 1unit PRBC, will get an additional unit  -likely 2/2 acute blood loss from hematuria for 2 weeks, justin transfuse to Hgb>7  -will get iron studies to assess for other etiologies of anemia

## 2022-01-20 NOTE — ED ADULT NURSE REASSESSMENT NOTE - NS ED NURSE REASSESS COMMENT FT1
Pt awake and alert, respirations are even and unlabored, sating at 100% on RA, sinus tachycardia on monitor, and in no acute distress at this time. Pt and pt family both educated on blood transfusion reactions, consent in the chart, and verified with 2 RNs.

## 2022-01-20 NOTE — CONSULT NOTE ADULT - SUBJECTIVE AND OBJECTIVE BOX
[facility] DIVISION OF KIDNEY DISEASES AND HYPERTENSION -- [phone]  -- INITIAL CONSULT NOTE  --------------------------------------------------------------------------------  HPI: 89 year old male with HTN, ? prostate cancer s/p cystoscopy and mass resection in October 2021 admitted to [facility] with worsening hematuria, malaise and poor appetite. Admission labs relevant for renal failure and hyperkalemia. CT scan with evidence of B/L hydronephrosis, severe on the right side with bladder distention and clot burden. Pt. with minimal UOP since Leija insertion. As per ninathan at bedside, pt. with intermittent hematuria since October, worse over the last ~ 10 days with minimal UOP. Pt. hasn't followed with Urology at [facility]. No prior history of kidney disease as per family. Last Scr in 2/2019 was WNL at 1.09.  Pt. seen and examined in ER. Pt. appeared in NAD however complained of lower abdominal fullness / tenderness. Pt. endorses poor appetite, malaise and states he wants to feel better.     PAST HISTORY  --------------------------------------------------------------------------------  PAST MEDICAL & SURGICAL HISTORY:  Hypertension  Prostate cancer  No significant past surgical history    FAMILY HISTORY: HTN    PAST SOCIAL HISTORY: Denies heavy alcohol use    ALLERGIES & MEDICATIONS  --------------------------------------------------------------------------------  Allergies    Stool Softener (Swelling)    Intolerances    Standing Inpatient Medications  calcium gluconate IVPB 500 Gram(s) IV Intermittent Once    REVIEW OF SYSTEMS  --------------------------------------------------------------------------------  Limited ROS as per HPI    VITALS/PHYSICAL EXAM  --------------------------------------------------------------------------------  T(C): 36.7 (01-20-22 @ 03:55), Max: 37.4 (01-19-22 @ 19:55)  HR: 102 (01-20-22 @ 03:55) (79 - 102)  BP: 125/71 (01-20-22 @ 03:55) (101/44 - 125/71)  RR: 17 (01-20-22 @ 03:55) (17 - 22)  SpO2: 100% (01-20-22 @ 03:55) (100% - 100%)  Wt(kg): --  Height (cm): 160 (01-19-22 @ 19:55)    Physical Exam:  	Gen: Frail, ill-appearing  	HEENT: Anicteric  	Pulm: CTA B/L  	CV: S1S2  	Abd: Soft, Distended, supra-pubic tenderness to palpation  	Ext: No LE edema B/L  	Neuro: Awake  	Skin: Warm and dry    LABS/STUDIES  --------------------------------------------------------------------------------              6.5    10.21 >-----------<  335      [01-20-22 @ 00:21]              21.1     121  |  88  |  95  ----------------------------<  242      [01-20-22 @ 02:50]  7.1   |  19  |  9.82        Ca     8.5     [01-20-22 @ 02:50]      Mg     2.40     [01-20-22 @ 02:50]      Phos  4.9     [01-20-22 @ 02:50]    TPro  7.3  /  Alb  3.1  /  TBili  0.3  /  DBili  x   /  AST  21  /  ALT  20  /  AlkPhos  73  [01-20-22 @ 00:21]    Creatinine Trend:  SCr 9.82 [01-20 @ 02:50]  SCr 9.87 [01-20 @ 00:21]    Urinalysis - [01-20-22 @ 02:48]      Color Dark Brown / Appearance Turbid / SG 1.039 / pH 8.5      Gluc Trace / Ketone Negative  / Bili Negative / Urobili <2 mg/dL       Blood Moderate / Protein >600 mg/dL / Leuk Est Negative / Nitrite Negative      RBC >50 / WBC >50 / Hyaline 4-8 / Gran  / Sq Epi  / Non Sq Epi  / Bacteria Moderate    HbA1c 5.7      [12-30-18 @ 14:04]

## 2022-01-20 NOTE — CONSULT NOTE ADULT - PROBLEM SELECTOR RECOMMENDATION 2
In the setting of renal failure and obstructive uropathy. Plans for re-insertion of Mendez with possible clot aspiration / irrigation with Urology at bedside. Serum potassium elevated at 7.2 on admission which improved to 6.4 on most recent VBG. Awaiting BMP results. Will monitor potassium closely after re-insertion of mendez catheter. Continue telemetry and medical management. Consider an amp of bicarb given worsening lactic acidosis. If hyperkalemia persists, would need to consider HD if in line with GOC.

## 2022-01-20 NOTE — CONSULT NOTE ADULT - SUBJECTIVE AND OBJECTIVE BOX
HPI:  This is a 89 year old male with a medical history significant for HTN, prostate ca, s/p XRT, and recent cystoscopy 10/28/21 for a bladder tumor, presenting to the ED with hematuria, malaise, lethargy and altered mental status x 2 weeks.  ED workup significant for hgb 6.5, hct 21.1, creatinine 9.87, K 7.1, Na 122.    CT with findings of : Moderate to severe right and moderate left hydroureteronephrosis to the level of the urinary bladder which is distended with a large amount of clot that is likely obstructing the UV junctions. Additionally there is a 3 mm right UVJ stone.  Patient is also Covid +.  Family denies fevers.     PAST MEDICAL & SURGICAL HISTORY:  Hypertension    Prostate cancer    No significant past surgical history    MEDICATIONS  (STANDING):  calcium gluconate IVPB 500 Gram(s) IV Intermittent Once  insulin regular  human recombinant 5 Unit(s) IV Push Once    FAMILY HISTORY:    Allergies  Stool Softener (Swelling)    SOCIAL HISTORY:    REVIEW OF SYSTEMS: Otherwise negative as stated in HPI    Vital Signs Last 24 Hrs  T(C): 36.7 (2022 03:55), Max: 37.4 (2022 19:55)  T(F): 98.1 (2022 03:55), Max: 99.3 (2022 19:55)  HR: 102 (2022 03:55) (79 - 102)  BP: 125/71 (2022 03:55) (101/44 - 125/71)  BP(mean): --  RR: 17 (2022 03:55) (17 - 22)  SpO2: 100% (2022 03:55) (100% - 100%)    PHYSICAL EXAM:  General: lethargic    Respiratory and Thorax: no resp distress   	  Cardiovascular: regular     Gastrointestinal: soft, non tender, no appreciable CVAT     Genitourinary: Glans not circumcised. 18F mendez in place, minimally draining, punch colored urine.  18F catheter removed.  20F Six eye catheter placed in sterile technique. Removed ~200cc of clot.  Manually irrigated with 2L of normal saline, color improved from merlot to punch color.  Six eye catheter was removed, and a 24F three way catheter placed in sterile technique with a 30cc balloon.  CBI started, urine cleared on CBI.      LABS:                        6.5    10.21 )-----------( 335      ( 2022 00:21 )             21.1         123<L>  |  88<L>  |  96<H>  ----------------------------<  107<H>  6.3<HH>   |  18<L>  |  9.96<H>    Ca    8.7      2022 05:00  Phos  4.6       Mg     2.50         TPro  7.3  /  Alb  3.1<L>  /  TBili  0.3  /  DBili  x   /  AST    /  ALT    /  AlkPhos  73      PT/INR - ( 2022 00:21 )   PT: 12.4 sec;   INR: 1.08 ratio    PTT - ( 2022 00:21 )  PTT:29.1 sec    Urinalysis Basic - ( 2022 02:48 )  Color: Dark Brown / Appearance: Turbid / S.039 / pH: x  Gluc: x / Ketone: Negative  / Bili: Negative / Urobili: <2 mg/dL   Blood: x / Protein: >600 mg/dL / Nitrite: Negative   Leuk Esterase: Negative / RBC: >50 /HPF / WBC >50 /HPF   Sq Epi: x / Non Sq Epi: x / Bacteria: Moderate    RADIOLOGY:  < from: CT Abdomen and Pelvis No Cont (22 @ 04:08) >  PROCEDURE:  CT of the Abdomen and Pelvis was performed.  Sagittal and coronal reformats were performed.    FINDINGS:  LOWER CHEST: Coronary artery and aortic calcification.    LIVER: Within normallimits.  BILE DUCTS: Normal caliber.  GALLBLADDER: High density material within the gallbladder, likely sludge.  SPLEEN: Within normal limits.  PANCREAS: Within normal limits.  ADRENALS: Right adrenal gland thickening.  KIDNEYS/URETERS: Moderate to severe right hydronephrosis to the level of   the urinary bladder. A right UVJ stone measures 3 mm. Moderate left   hydronephrosis to the level of the urinary bladder without obstructing   calculi. Right lower pole renal cyst.    BLADDER: Distended urinary bladder containing a large amount of   hyperdense material compatible with blood clot. Diverticular outpouchings   of the bladder posteriorly, likely sequela of chronic outlet obstruction.   4 mm bladder wall calculus. Mendez catheter in the lumen.  REPRODUCTIVE ORGANS: Prostate is enlarged.    BOWEL: No bowel obstruction. Appendix is normal. Moderate stool   distention of the colon. Diverticulosis coli.  PERITONEUM: No ascites.  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 Atherosclerotic calcifications.  RETROPERITONEUM/LYMPH NODES: No lymphadenopathy.  ABDOMINAL WALL: Small fat-containing left inguinal hernia. High riding   right testicle in the inguinal canal. Small right hydrocele.  BONES: Multilevel degenerative changes of the spine.    IMPRESSION:  Moderate to severe right and moderate left hydroureteronephrosis to the   level of the urinary bladder which is distended with a large amount of   clot that is likely obstructing the UV junctions. Additionally there is a   3 mm right UVJ stone.    --- End of Report ---    PRADIP SAL MD; Resident Radiology  This document has been electronically signed.  MARISA VILLARREAL MD; Attending Radiologist  This document has been electronically signed. 2022  4:46AM    < end of copied text >

## 2022-01-20 NOTE — H&P ADULT - PROBLEM SELECTOR PLAN 1
-K 7.2 on admission, improved to 6.3 after he received calcium gluconate, albuterol, lasix, insulin 20U, and dextrose in ED  -EKG showed no acute changes of hyperkalemia   -Renal was consulted, would consider HD if hyperkalemia fails to resolve  -can consider bicarb amp given worsening lactic acidosis, however latest pH wnl.  -fingersticks ordered given large insulin dose, likely will remain in system for some time due to decreased kidney function.

## 2022-01-20 NOTE — H&P ADULT - NSHPSOCIALHISTORY_GEN_ALL_CORE
Smoking - former, quit 15+ years ago  Alcohol use   Drug use    Lives with son, niece and nephew help take care of him. Smoking - former, quit 15+ years ago  Alcohol use -niece  Drug use - denies    Lives with son, niece and nephew help take care of him.

## 2022-01-20 NOTE — ED ADULT NURSE REASSESSMENT NOTE - NS ED NURSE REASSESS COMMENT FT1
Pt awake and alert, respirations are even and unlabored, sating at 100% on RA, pt is restless. Lab called for abnormal result on blood gas: potassium 7.5, hemoglobin 5.7, and hematocrit of 17.0, read back by Tarun MANNING

## 2022-01-20 NOTE — PATIENT PROFILE ADULT - FUNCTIONAL ASSESSMENT - BASIC MOBILITY SCORE.
Notified by Chante in lab that WBC are 1.8. Platelets 37. Results read back and verified. Dr. Pimentel returned page and read back wbc and plt lab results, no new orders at this time. MD aware of potassium being low as well.    12

## 2022-01-20 NOTE — H&P ADULT - NSHPPHYSICALEXAM_GEN_ALL_CORE
VITALS:   T(C): 36.5 (01-20-22 @ 07:41), Max: 37.4 (01-19-22 @ 19:55)  HR: 104 (01-20-22 @ 09:01) (79 - 104)  BP: 107/63 (01-20-22 @ 09:01) (101/44 - 130/62)  RR: 20 (01-20-22 @ 09:01) (15 - 22)  SpO2: 100% (01-20-22 @ 09:01) (100% - 100%)    GENERAL: NAD, lying in bed comfortably  HEAD:  Atraumatic, normocephalic  EYES: EOMI, PERRLA, conjunctiva and sclera clear  ENT: Moist mucous membranes  NECK: Supple, no JVD  HEART: Regular rate and rhythm, no murmurs, rubs, or gallops  LUNGS: Unlabored respirations.  Clear to auscultation bilaterally, no crackles, wheezing, or rhonchi  ABDOMEN: Soft, nontender, nondistended, +BS  EXTREMITIES: 2+ peripheral pulses bilaterally. No clubbing, cyanosis, or edema  NERVOUS SYSTEM:  A&Ox3, no focal deficits   SKIN: No rashes or lesions VITALS:   T(C): 36.5 (01-20-22 @ 07:41), Max: 37.4 (01-19-22 @ 19:55)  HR: 104 (01-20-22 @ 09:01) (79 - 104)  BP: 107/63 (01-20-22 @ 09:01) (101/44 - 130/62)  RR: 20 (01-20-22 @ 09:01) (15 - 22)  SpO2: 100% (01-20-22 @ 09:01) (100% - 100%)    GENERAL: NAD, lying in bed comfortably, thin appearance  HEAD:  Atraumatic, normocephalic  EYES: EOMI, PERRLA, conjunctiva and sclera clear  ENT: Moist mucous membranes  NECK: Supple, no JVD  HEART: Regular rate and rhythm, no murmurs, rubs, or gallops  LUNGS: Unlabored respirations. Mild wheeze b/l anteriorly but otherwise clear to auscultation bilaterally  ABDOMEN: Soft, nontender, mildly distended, +BS  EXTREMITIES: 2+ peripheral pulses bilaterally. No clubbing, cyanosis, or edema  NERVOUS SYSTEM:  A&Ox3, no focal deficits   SKIN: No rashes or lesions

## 2022-01-20 NOTE — H&P ADULT - PROBLEM SELECTOR PLAN 3
-Na 122 on admission, asymptomatic without neurological changes.  -will send for UA, Urine osm and lytes + serum osm with next labs  -Na trending up to 127, switched fluids to D5 to avoid overcorrection.

## 2022-01-20 NOTE — H&P ADULT - ASSESSMENT
Mr. Aguilar is a 89YOM with PMH of HTN, prostate cancer s/p XRT, and previous cystoscopy with bladder tumor removal on 10/28/2021 at [facility] presents with 2 week history of hematuria, malaise, and lethargy.

## 2022-01-20 NOTE — H&P ADULT - NSHPLABSRESULTS_GEN_ALL_CORE
LABS:                          6.9    9.13  )-----------( 283      ( 20 Jan 2022 07:25 )             21.4     01-20    127<L>  |  91<L>  |  95<H>  ----------------------------<  108<H>  6.3<HH>   |  19<L>  |  9.90<H>    Ca    8.4      20 Jan 2022 11:11  Phos  4.6     01-20  Mg     2.50     01-20    TPro  5.4<L>  /  Alb  2.4<L>  /  TBili  0.6  /  DBili  x   /  AST  15  /  ALT  15  /  AlkPhos  54  01-20    PT/INR - ( 20 Jan 2022 00:21 )   PT: 12.4 sec;   INR: 1.08 ratio         PTT - ( 20 Jan 2022 00:21 )  PTT:29.1 sec

## 2022-01-20 NOTE — CHART NOTE - NSCHARTNOTEFT_GEN_A_CORE
Repeat Labs reviewed. Serum potassium persistently elevated to 6.8. Discussed with family - Priya Aguilar (Niece, [phone]), Melvi Baron (Daughter, [phone], [phone]) regarding urgent HD in view of severe hyperkalemia. Risks and benefits associated with the HD explained at length. Verbal consent obtained.   Discussed with medical team regarding urgent HD. Will place HD orders once HD catheter is placed.     If you have any questions, please feel free to contact me  Ian Vela  Nephrology Fellow  [phone]  (After 5pm or on weekends please page the on-call fellow). Repeat Labs reviewed. Serum potassium persistently elevated to 6.8. Discussed with family - Priya Aguilar (Niece, [phone]), Melvi Baron (Daughter, [phone], [phone]) regarding urgent HD in view of severe hyperkalemia. Risks and benefits associated with the HD explained at length. Verbal consent obtained.   Discussed with medical team regarding urgent HD. Will place HD orders once HD catheter is placed.   Discussed with attending on call.    If you have any questions, please feel free to contact me  Ian Vela  Nephrology Fellow  [phone]  (After 5pm or on weekends please page the on-call fellow).

## 2022-01-20 NOTE — H&P ADULT - ATTENDING COMMENTS
89M HTN s/p cystocopy and TURBT in October 2021 p/w KLOBY due to obstructive uropathy and hematuria with lactic acidosis, anemia, hyperkalemia  --appreciate urology recs, on CBI, confirm that obstruction is relieved  --appreciate nephrology recs, treating for hyperkalemia, close monitoring of K, s/p maximal medical treatments to lower K, urgent HD may be required  --s/p 2 U PRBC, urine clearing  --telemetry monitoring

## 2022-01-20 NOTE — H&P ADULT - PROBLEM SELECTOR PLAN 2
-renal failure in setting of obstructive uropathy + b/l hydronephrosis + anemia   -no previous history of renal failure  -creatinine 9.87 on admission, latest 9.90, renal to consider HD/RRT once hemoglobin stable  -pt. found to have large clot in bladder causing distention, urology consulted for mendez placement and CBI  -urology recommends continuing CBI and weaning as needed.

## 2022-01-20 NOTE — CHART NOTE - NSCHARTNOTEFT_GEN_A_CORE
Hugo Brian MD  Internal Medicine, PGY3  [phone]    MICU Accept Note    HPI / INTERVAL HISTORY:  HPI:  Mr. Freeman is a 89YOM with PMH of HTN, prostate cancer s/p XRT, and previous cystoscopy with bladder tumor removal on 10/28/2021 at [facility] presents with 2 week history of hematuria, malaise, and lethargy. Per patient's niece, the patient had recovered well from the cystoscopy in October but was known to have persistent anemia after the procedure. She reports that he was otherwise well, ambulating independently and A&Ox3 until appx. 2 weeks ago when he began to have hematuria, increased urinary frequency, and was witnessed to be more lethargic and confused at which point he was brought to the ED. Patient defers medical decision making to niece at this time who wishes for patient to remain full code.    In the ED the patient was found to have anemia with Hgb of 6.5, K of 7.2, and Cr of 9.87. Lactate was initially elevated at 2.2 but uptrended to 4.0. He was given a unit of PRBC to address the anemia. EKG showed no acute changes of hyperkalemia and he received calcium gluconate, albuterol, lasix, insulin, and dextrose without change in K level, but patient remained stable without complaints. Renal was consulted for the patient's hyperkalemia and KOLBY and was re-dosed with calcium, lokelma, insulin, dextrose, and albuterol. Ultrasound was conducted for low UOP that showed bilateral hydronephrosis and substantial blood clot in bladder with associated distention + hydroureter. Urology was consulted and were able to extract 200cc clot from the bladder and start CBI. (2022 11:52)        REVIEW OF SYSTEMS:  [ ] Unable to assess ROS because ______  [ ] Negative except as stated in HPI  CONSTITUTIONAL: No fever, chills, night sweats, or fatigue  EYES: No eye pain, visual disturbances, or discharge  ENMT:  No difficulty hearing, tinnitus, vertigo; No sinus or throat pain  NECK: No pain or stiffness  BREASTS: No pain, masses, or nipple discharge  RESPIRATORY: No cough, wheezing, or hemoptysis; No shortness of breath  CARDIOVASCULAR: No chest pain, palpitations, dizziness, or leg swelling  GASTROINTESTINAL: No abdominal or epigastric pain. No nausea, vomiting, or hematemesis; No diarrhea or constipation. No melena or hematochezia.  GENITOURINARY: No dysuria, frequency, hematuria, or incontinence  NEUROLOGICAL: No headaches, memory loss, loss of strength, numbness, or tremors  SKIN: No itching, burning, rashes, or lesions   LYMPH NODES: No enlarged glands  ENDOCRINE: No heat or cold intolerance; No hair loss  MUSCULOSKELETAL: No joint pain or swelling; No muscle, back, or extremity pain  PSYCHIATRIC: No depression, anxiety, mood swings, or difficulty sleeping  HEME/LYMPH: No easy bruising, or bleeding gums  ALLERGY AND IMMUNOLOGIC: No hives or eczema    PMH/PSH:  PAST MEDICAL & SURGICAL HISTORY:  Hypertension    Prostate cancer    No significant past surgical history        FAMILY HISTORY:  No pertinent family history in first degree relatives        SOCIAL HISTORY:  Smoking: [  ] Never Smoked  [  ] Former Smoker (# packs x # years), quit   [  ] Current Smoker (# packs x # years)  Substance Use: [  ] None; [   ] Yes:  EtOH Use: [   ] None; [   ] Rare; [   ] Social; [   ] Frequent:   Marital Status: [  ] Single  [  ]   [  ]   [  ]   Dependents:  Occupation:  Barriers to treatment:   Advance Directives:     HOME MEDICATIONS:  Home Medications:  lisinopril 5 mg oral tablet:  orally 2 times a day (2022 13:12)      ALLERGIES:  Allergies    No Known Allergies    Intolerances            OBJECTIVE:  ICU Vital Signs Last 24 Hrs  T(C): 36.6 (2022 17:04), Max: 37.2 (2022 22:47)  T(F): 97.8 (2022 17:04), Max: 98.9 (2022 22:47)  HR: 91 (2022 17:04) (79 - 104)  BP: 115/68 (2022 17:04) (101/44 - 130/62)  BP(mean): --  ABP: --  ABP(mean): --  RR: 18 (2022 17:04) (15 - 22)  SpO2: 100% (2022 17:04) (100% - 100%)      I/O Summary 24H      CAPILLARY BLOOD GLUCOSE      POCT Blood Glucose.: 133 mg/dL (2022 12:34)      PHYSICAL EXAM  GENERAL: NAD, lying in bed comfortably  HEAD:  Atraumatic, Normocephalic  EYES: EOMI, PERRLA, conjunctiva and sclera clear  ENT: Moist mucous membranes  NECK: Supple, No JVD  CHEST/LUNG: Clear to auscultation bilaterally; No rales, rhonchi, wheezing, or rubs. Unlabored respirations  HEART: Regular rate and rhythm; No murmurs, rubs, or gallops  ABDOMEN: Bowel sounds present; Soft, Nontender, Nondistended. No hepatomegaly  EXTREMITIES:  2+ Peripheral Pulses, brisk capillary refill. No clubbing, cyanosis, or edema  NERVOUS SYSTEM:  Alert & Oriented X3, speech clear. No deficits   MSK: FROM all 4 extremities, full and equal strength  SKIN: No rashes or lesions    LINES:       HOSPITAL MEDICATIONS:  MEDICATIONS  (STANDING):  ALBUTerol   0.5% 10 milliGRAM(s) Nebulizer once  dextrose 5%. 1000 milliLiter(s) (75 mL/Hr) IV Continuous <Continuous>  dextrose 50% Injectable 50 milliLiter(s) IV Push once  influenza  Vaccine (HIGH DOSE) 0.7 milliLiter(s) IntraMuscular once  insulin regular  human recombinant 10 Unit(s) IV Push once  lactulose Syrup 10 Gram(s) Oral two times a day    MEDICATIONS  (PRN):        LABS:  CBC 22 @ 18:42                        8.5    12.81 )-----------( 261                   26.4       Hgb trend: 8.5 <-- , 6.9 <-- , 6.5 <--   WBC trend: 12.81 <-- , 9.13 <-- , 10.21 <--     CMP 22 @ 18:42    127<L>  |  91<L>  |  95<H>  ----------------------------<  121<H>  6.8<HH>   |  19<L>  |  10.68<H>    Ca    8.8      22 @ 18:42  Phos  6.2       Mg     2.40         TPro  6.1  /  Alb  2.6<L>  /  TBili  0.8  /  DBili  x   /  AST  17  /  ALT  18  /  AlkPhos  63        Serum Cr trend: 10.68 <-- , 9.90 <-- , 10.11 <-- , 9.96 <-- , 9.82 <-- , 9.87 <--   PT/INR - ( 2022 00:21 )   PT: 12.4 sec;   INR: 1.08 ratio         PTT - ( 2022 00:21 ):29.1 sec  ABG Trend:     VBG Trend:   22 @ 18:42 - pH: 7.24  | pCO2: 45    | pO2: 33    | HCO3: 19    | Lactate: 4.5    22 @ 11:11 - pH: 7.36  | pCO2: 28    | pO2: 187   | HCO3: 16    | Lactate: 4.1    22 @ 07:25 - pH: 7.23  | pCO2: 42    | pO2: 31    | HCO3: 18    | Lactate: 3.2    22 @ 05:00 - pH: 7.27  | pCO2: 37    | pO2: 33    | HCO3: 17    | Lactate: 4.0    22 @ 02:50 - pH: 7.32  | pCO2: 37    | pO2: 23    | HCO3: 19    | Lactate: 2.2        Urinalysis Basic - ( 2022 02:48 )    Color: Dark Brown / Appearance: Turbid / S.039 / pH: x  Gluc: x / Ketone: Negative  / Bili: Negative / Urobili: <2 mg/dL   Blood: x / Protein: >600 mg/dL / Nitrite: Negative   Leuk Esterase: Negative / RBC: >50 /HPF / WBC >50 /HPF   Sq Epi: x / Non Sq Epi: x / Bacteria: Moderate        MICROBIOLOGY:       RADIOLOGY & ADDITIONAL TESTS: Reviewed.    ASSESSMENT  GEOFFREY FREEMAN is a 89YOM with PMH of HTN, prostate cancer s/p XRT, and previous cystoscopy with bladder tumor removal on 10/28/2021 at [facility] presents with 2 week history of hematuria, malaise, and lethargy, found to have acute renal failure and hyperkalemia, worsening despite medical therapy, requiring urgent HD.     PLAN  Neuro  -AOx3, no acute issues    Cardiovascular  -No acute issues    Pulmonary  -No acute issues, on RA    GI  -Regular/renal diet    Renal  -Urgent HD    ID    Endo    Heme    Ppx    Lines/Tubes    GOC Hugo Brian MD  Internal Medicine, PGY3  [phone]    MICU Accept Note    HPI / INTERVAL HISTORY:  HPI:  Mr. Freeman is a 89YOM with PMH of HTN, prostate cancer s/p XRT, and previous cystoscopy with bladder tumor removal on 10/28/2021 at [facility] presents with 2 week history of hematuria, malaise, and lethargy. Per patient's niece, the patient had recovered well from the cystoscopy in October but was known to have persistent anemia after the procedure. She reports that he was otherwise well, ambulating independently and A&Ox3 until appx. 2 weeks ago when he began to have hematuria, increased urinary frequency, and was witnessed to be more lethargic and confused at which point he was brought to the ED. Patient defers medical decision making to niece at this time who wishes for patient to remain full code.    In the ED the patient was found to have anemia with Hgb of 6.5, K of 7.2, and Cr of 9.87. Lactate was initially elevated at 2.2 but uptrended to 4.0. He was given a unit of PRBC to address the anemia. EKG showed no acute changes of hyperkalemia and he received calcium gluconate, albuterol, lasix, insulin, and dextrose without change in K level, but patient remained stable without complaints. Renal was consulted for the patient's hyperkalemia and KOLBY and was re-dosed with calcium, lokelma, insulin, dextrose, and albuterol. Ultrasound was conducted for low UOP that showed bilateral hydronephrosis and substantial blood clot in bladder with associated distention + hydroureter. Urology was consulted and were able to extract 200cc clot from the bladder and start CBI. (2022 11:52)    Accepted to MICU for urgent dialysis.         REVIEW OF SYSTEMS:  [ ] Unable to assess ROS because ______  [x ] Negative except as stated in HPI  CONSTITUTIONAL: No fever, chills, night sweats, or fatigue  EYES: No eye pain, visual disturbances, or discharge  ENMT:  No difficulty hearing, tinnitus, vertigo; No sinus or throat pain  NECK: No pain or stiffness  BREASTS: No pain, masses, or nipple discharge  RESPIRATORY: No cough, wheezing, or hemoptysis; No shortness of breath  CARDIOVASCULAR: No chest pain, palpitations, dizziness, or leg swelling  GASTROINTESTINAL: No abdominal or epigastric pain. No nausea, vomiting, or hematemesis; No diarrhea or constipation. No melena or hematochezia.  GENITOURINARY: No dysuria, frequency, hematuria, or incontinence  NEUROLOGICAL: No headaches, memory loss, loss of strength, numbness, or tremors  SKIN: No itching, burning, rashes, or lesions   LYMPH NODES: No enlarged glands  ENDOCRINE: No heat or cold intolerance; No hair loss  MUSCULOSKELETAL: No joint pain or swelling; No muscle, back, or extremity pain  PSYCHIATRIC: No depression, anxiety, mood swings, or difficulty sleeping  HEME/LYMPH: No easy bruising, or bleeding gums  ALLERGY AND IMMUNOLOGIC: No hives or eczema    PMH/PSH:  PAST MEDICAL & SURGICAL HISTORY:  Hypertension    Prostate cancer    No significant past surgical history        FAMILY HISTORY:  No pertinent family history in first degree relatives        SOCIAL HISTORY:  Smoking: [  ] Never Smoked  [  ] Former Smoker (# packs x # years), quit   [  ] Current Smoker (# packs x # years)  Substance Use: [  ] None; [   ] Yes:  EtOH Use: [   ] None; [   ] Rare; [   ] Social; [   ] Frequent:   Marital Status: [  ] Single  [  ]   [  ]   [  ]   Dependents:  Occupation:  Barriers to treatment:   Advance Directives:     HOME MEDICATIONS:  Home Medications:  lisinopril 5 mg oral tablet:  orally 2 times a day (2022 13:12)      ALLERGIES:  Allergies    No Known Allergies    Intolerances            OBJECTIVE:  ICU Vital Signs Last 24 Hrs  T(C): 36.6 (2022 17:04), Max: 37.2 (2022 22:47)  T(F): 97.8 (2022 17:04), Max: 98.9 (2022 22:47)  HR: 91 (2022 17:04) (79 - 104)  BP: 115/68 (2022 17:04) (101/44 - 130/62)  BP(mean): --  ABP: --  ABP(mean): --  RR: 18 (2022 17:04) (15 - 22)  SpO2: 100% (2022 17:04) (100% - 100%)      I/O Summary 24H      CAPILLARY BLOOD GLUCOSE      POCT Blood Glucose.: 133 mg/dL (2022 12:34)      PHYSICAL EXAM  GENERAL: NAD, lying in bed comfortably  HEAD:  Atraumatic, Normocephalic  EYES: EOMI, PERRLA, conjunctiva and sclera clear  ENT: Moist mucous membranes  NECK: Supple, No JVD  CHEST/LUNG: Clear to auscultation bilaterally; No rales, rhonchi, wheezing, or rubs. Unlabored respirations  HEART: Regular rate and rhythm; No murmurs, rubs, or gallops  ABDOMEN: Bowel sounds present; Soft, Nontender, Nondistended. No hepatomegaly  EXTREMITIES:  2+ Peripheral Pulses, brisk capillary refill. No clubbing, cyanosis, or edema  NERVOUS SYSTEM:  Alert & Oriented X3, speech clear. No deficits   MSK: FROM all 4 extremities, full and equal strength  SKIN: No rashes or lesions    LINES:       HOSPITAL MEDICATIONS:  MEDICATIONS  (STANDING):  ALBUTerol   0.5% 10 milliGRAM(s) Nebulizer once  dextrose 5%. 1000 milliLiter(s) (75 mL/Hr) IV Continuous <Continuous>  dextrose 50% Injectable 50 milliLiter(s) IV Push once  influenza  Vaccine (HIGH DOSE) 0.7 milliLiter(s) IntraMuscular once  insulin regular  human recombinant 10 Unit(s) IV Push once  lactulose Syrup 10 Gram(s) Oral two times a day    MEDICATIONS  (PRN):        LABS:  CBC 22 @ 18:42                        8.5    12.81 )-----------( 261                   26.4       Hgb trend: 8.5 <-- , 6.9 <-- , 6.5 <--   WBC trend: 12.81 <-- , 9.13 <-- , 10.21 <--     CMP 22 @ 18:42    127<L>  |  91<L>  |  95<H>  ----------------------------<  121<H>  6.8<HH>   |  19<L>  |  10.68<H>    Ca    8.8      22 @ 18:42  Phos  6.2       Mg     2.40         TPro  6.1  /  Alb  2.6<L>  /  TBili  0.8  /  DBili  x   /  AST  17  /  ALT  18  /  AlkPhos  63        Serum Cr trend: 10.68 <-- , 9.90 <-- , 10.11 <-- , 9.96 <-- , 9.82 <-- , 9.87 <--   PT/INR - ( 2022 00:21 )   PT: 12.4 sec;   INR: 1.08 ratio         PTT - ( 2022 00:21 ):29.1 sec  ABG Trend:     VBG Trend:   22 @ 18:42 - pH: 7.24  | pCO2: 45    | pO2: 33    | HCO3: 19    | Lactate: 4.5    22 @ 11:11 - pH: 7.36  | pCO2: 28    | pO2: 187   | HCO3: 16    | Lactate: 4.1    22 @ 07:25 - pH: 7.23  | pCO2: 42    | pO2: 31    | HCO3: 18    | Lactate: 3.2    22 @ 05:00 - pH: 7.27  | pCO2: 37    | pO2: 33    | HCO3: 17    | Lactate: 4.0    22 @ 02:50 - pH: 7.32  | pCO2: 37    | pO2: 23    | HCO3: 19    | Lactate: 2.2        Urinalysis Basic - ( 2022 02:48 )    Color: Dark Brown / Appearance: Turbid / S.039 / pH: x  Gluc: x / Ketone: Negative  / Bili: Negative / Urobili: <2 mg/dL   Blood: x / Protein: >600 mg/dL / Nitrite: Negative   Leuk Esterase: Negative / RBC: >50 /HPF / WBC >50 /HPF   Sq Epi: x / Non Sq Epi: x / Bacteria: Moderate        MICROBIOLOGY:       RADIOLOGY & ADDITIONAL TESTS: Reviewed.    ASSESSMENT  GEOFFREY FREEMAN is a 89YOM with PMH of HTN, prostate cancer s/p XRT, and previous cystoscopy with bladder tumor removal on 10/28/2021 at [facility] presents with 2 week history of hematuria, malaise, and lethargy, found to have acute renal failure and hyperkalemia, worsening despite medical therapy, requiring urgent HD.     PLAN  Neuro  -AOx3, no acute issues    Cardiovascular  -No acute issues    Pulmonary  -No acute issues, on RA    GI  -renal diet    Renal  -Urgent HD    ID  -no concern for infection at this time  -cont to monitor off abx    Endo  -given large doses of insulin due to the hyperkalemia   -monitor his FS Q4 as pt has poor renal clearance and will keep insulin longer in his system    Heme  -anemia in the setting of hematuria and poor renal function  -cont to trend CBC Q12 and monitor for stability, can transfuse if less than 7     Ppx  -SCDs for now as pt is anemic in the setting of hematuria and will hold off AC at this time    Lines/Tubes    GOC  -pt full code at this time  -will discuss with family in the AM for further GOC Hugo Brian MD  Internal Medicine, PGY3  [phone]    MICU Accept Note    HPI / INTERVAL HISTORY:  HPI:  Mr. Freeman is a 89YOM with PMH of HTN, prostate cancer s/p XRT, and previous cystoscopy with bladder tumor removal on 10/28/2021 at [facility] presents with 2 week history of hematuria, malaise, and lethargy. Per patient's niece, the patient had recovered well from the cystoscopy in October but was known to have persistent anemia after the procedure. She reports that he was otherwise well, ambulating independently and A&Ox3 until appx. 2 weeks ago when he began to have hematuria, increased urinary frequency, and was witnessed to be more lethargic and confused at which point he was brought to the ED. Patient defers medical decision making to niece at this time who wishes for patient to remain full code.    In the ED the patient was found to have anemia with Hgb of 6.5, K of 7.2, and Cr of 9.87. Lactate was initially elevated at 2.2 but uptrended to 4.0. He was given a unit of PRBC to address the anemia. EKG showed no acute changes of hyperkalemia and he received calcium gluconate, albuterol, lasix, insulin, and dextrose without change in K level, but patient remained stable without complaints. Renal was consulted for the patient's hyperkalemia and JUSTIN and was re-dosed with calcium, lokelma, insulin, dextrose, and albuterol. Ultrasound was conducted for low UOP that showed bilateral hydronephrosis and substantial blood clot in bladder with associated distention + hydroureter. Urology was consulted and were able to extract 200cc clot from the bladder and start CBI. (2022 11:52)    Accepted to MICU for urgent dialysis.         REVIEW OF SYSTEMS:  [ ] Unable to assess ROS because ______  [x ] Negative except as stated in HPI  CONSTITUTIONAL: No fever, chills, night sweats, or fatigue  EYES: No eye pain, visual disturbances, or discharge  ENMT:  No difficulty hearing, tinnitus, vertigo; No sinus or throat pain  NECK: No pain or stiffness  BREASTS: No pain, masses, or nipple discharge  RESPIRATORY: No cough, wheezing, or hemoptysis; No shortness of breath  CARDIOVASCULAR: No chest pain, palpitations, dizziness, or leg swelling  GASTROINTESTINAL: No abdominal or epigastric pain. No nausea, vomiting, or hematemesis; No diarrhea or constipation. No melena or hematochezia.  GENITOURINARY: No dysuria, frequency, hematuria, or incontinence  NEUROLOGICAL: No headaches, memory loss, loss of strength, numbness, or tremors  SKIN: No itching, burning, rashes, or lesions   LYMPH NODES: No enlarged glands  ENDOCRINE: No heat or cold intolerance; No hair loss  MUSCULOSKELETAL: No joint pain or swelling; No muscle, back, or extremity pain  PSYCHIATRIC: No depression, anxiety, mood swings, or difficulty sleeping  HEME/LYMPH: No easy bruising, or bleeding gums  ALLERGY AND IMMUNOLOGIC: No hives or eczema    PMH/PSH:  PAST MEDICAL & SURGICAL HISTORY:  Hypertension    Prostate cancer    No significant past surgical history        FAMILY HISTORY:  No pertinent family history in first degree relatives        SOCIAL HISTORY:  Smoking: [  ] Never Smoked  [  ] Former Smoker (# packs x # years), quit   [  ] Current Smoker (# packs x # years)  Substance Use: [  ] None; [   ] Yes:  EtOH Use: [   ] None; [   ] Rare; [   ] Social; [   ] Frequent:   Marital Status: [  ] Single  [  ]   [  ]   [  ]   Dependents:  Occupation:  Barriers to treatment:   Advance Directives:     HOME MEDICATIONS:  Home Medications:  lisinopril 5 mg oral tablet:  orally 2 times a day (2022 13:12)      ALLERGIES:  Allergies    No Known Allergies    Intolerances            OBJECTIVE:  ICU Vital Signs Last 24 Hrs  T(C): 36.6 (2022 17:04), Max: 37.2 (2022 22:47)  T(F): 97.8 (2022 17:04), Max: 98.9 (2022 22:47)  HR: 91 (2022 17:04) (79 - 104)  BP: 115/68 (2022 17:04) (101/44 - 130/62)  BP(mean): --  ABP: --  ABP(mean): --  RR: 18 (2022 17:04) (15 - 22)  SpO2: 100% (2022 17:04) (100% - 100%)      I/O Summary 24H      CAPILLARY BLOOD GLUCOSE      POCT Blood Glucose.: 133 mg/dL (2022 12:34)      PHYSICAL EXAM  GENERAL: NAD, lying in bed comfortably  HEAD:  Atraumatic, Normocephalic  EYES: EOMI, PERRLA, conjunctiva and sclera clear  ENT: Moist mucous membranes  NECK: Supple, No JVD  CHEST/LUNG: Clear to auscultation bilaterally; No rales, rhonchi, wheezing, or rubs. Unlabored respirations  HEART: Regular rate and rhythm; No murmurs, rubs, or gallops  ABDOMEN: Bowel sounds present; Soft, Nontender, Nondistended. No hepatomegaly  EXTREMITIES:  2+ Peripheral Pulses, brisk capillary refill. No clubbing, cyanosis, or edema  NERVOUS SYSTEM:  Alert & Oriented X3, speech clear. No deficits   MSK: FROM all 4 extremities, full and equal strength  SKIN: No rashes or lesions    LINES:       HOSPITAL MEDICATIONS:  MEDICATIONS  (STANDING):  ALBUTerol   0.5% 10 milliGRAM(s) Nebulizer once  dextrose 5%. 1000 milliLiter(s) (75 mL/Hr) IV Continuous <Continuous>  dextrose 50% Injectable 50 milliLiter(s) IV Push once  influenza  Vaccine (HIGH DOSE) 0.7 milliLiter(s) IntraMuscular once  insulin regular  human recombinant 10 Unit(s) IV Push once  lactulose Syrup 10 Gram(s) Oral two times a day    MEDICATIONS  (PRN):        LABS:  CBC 22 @ 18:42                        8.5    12.81 )-----------( 261                   26.4       Hgb trend: 8.5 <-- , 6.9 <-- , 6.5 <--   WBC trend: 12.81 <-- , 9.13 <-- , 10.21 <--     CMP 22 @ 18:42    127<L>  |  91<L>  |  95<H>  ----------------------------<  121<H>  6.8<HH>   |  19<L>  |  10.68<H>    Ca    8.8      22 @ 18:42  Phos  6.2       Mg     2.40         TPro  6.1  /  Alb  2.6<L>  /  TBili  0.8  /  DBili  x   /  AST  17  /  ALT  18  /  AlkPhos  63        Serum Cr trend: 10.68 <-- , 9.90 <-- , 10.11 <-- , 9.96 <-- , 9.82 <-- , 9.87 <--   PT/INR - ( 2022 00:21 )   PT: 12.4 sec;   INR: 1.08 ratio         PTT - ( 2022 00:21 ):29.1 sec  ABG Trend:     VBG Trend:   22 @ 18:42 - pH: 7.24  | pCO2: 45    | pO2: 33    | HCO3: 19    | Lactate: 4.5    22 @ 11:11 - pH: 7.36  | pCO2: 28    | pO2: 187   | HCO3: 16    | Lactate: 4.1    22 @ 07:25 - pH: 7.23  | pCO2: 42    | pO2: 31    | HCO3: 18    | Lactate: 3.2    22 @ 05:00 - pH: 7.27  | pCO2: 37    | pO2: 33    | HCO3: 17    | Lactate: 4.0    22 @ 02:50 - pH: 7.32  | pCO2: 37    | pO2: 23    | HCO3: 19    | Lactate: 2.2        Urinalysis Basic - ( 2022 02:48 )    Color: Dark Brown / Appearance: Turbid / S.039 / pH: x  Gluc: x / Ketone: Negative  / Bili: Negative / Urobili: <2 mg/dL   Blood: x / Protein: >600 mg/dL / Nitrite: Negative   Leuk Esterase: Negative / RBC: >50 /HPF / WBC >50 /HPF   Sq Epi: x / Non Sq Epi: x / Bacteria: Moderate        MICROBIOLOGY:       RADIOLOGY & ADDITIONAL TESTS: Reviewed.    ASSESSMENT  GEOFFREY FREEMAN is a 89YOM with PMH of HTN, prostate cancer s/p XRT, and previous cystoscopy with bladder tumor removal on 10/28/2021 at [facility] presents with 2 week history of hematuria, malaise, and lethargy, found to have acute renal failure and hyperkalemia, worsening despite medical therapy, requiring urgent HD.     PLAN  Neuro  -AOx3, no acute issues    Cardiovascular  -No acute issues    Pulmonary  -No acute issues, on RA    GI  -renal diet    Renal  -Urgent HD    ID  -no concern for infection at this time  -cont to monitor off abx    Endo  -given large doses of insulin due to the hyperkalemia   -monitor his FS Q4 as pt has poor renal clearance and will keep insulin longer in his system    Heme  -anemia in the setting of hematuria and poor renal function  -cont to trend CBC Q12 and monitor for stability, can transfuse if less than 7     Ppx  -SCDs for now as pt is anemic in the setting of hematuria and will hold off AC at this time    Lines/Tubes    GOC  -pt full code at this time  -will discuss with family in the AM for further GOC    CCM attending:    pt seen and examined as above, Pt with hx prostate cancer with bladder obstruction,  acute kidney injury with hyperkalemia, anemia and creatinine 10.  Pt treated with   d50, insulin, lokelma without any effect on the potassium.  Currently on cbi  with clots noted.  PE bp 137/50 rr 18  heent no jvd, lungs clear heart s1s2 abdomen cachectic,  ext no edema, neuro moves all ext .  labs as  above   k 6.8   bicarb 19, creatinine 10  hgb 8.5  -89 yo male with justin , hyperkalemia from urinary obstruction  -will place hemodialysis catheter for urgent hemodialysis, consent  obtained from family  -monitor ekg, check echo  -continue cbi, monitor urine output  -dvt prophylaxis  -repeat bmp post hemodialysis  -critically ill with acute kidney injury and hyperkalemia  managed at bedside x 40 minutes

## 2022-01-21 NOTE — CHART NOTE - NSCHARTNOTEFT_GEN_A_CORE
Hugo Brian MD  Internal Medicine, PGY3  [phone]    MICU Transfer Note    Transfer from: MICU    Transfer to: ( ) Medicine    ( ) Telemetry     ( ) RCU        ( ) Palliative         ( ) Stroke Unit          ( ) __________________    Accepting physician:      MICU COURSE:  ANEL aurelia placed and pt had urgent HD, resulting in improvement in hyperkalemia. Electrolytes remained normal afterwards. Urology turned off CBI, no hematuria noted in mendez.         ASSESSMENT & PLAN:     89YOM with PMH of HTN, prostate cancer s/p XRT, and previous cystoscopy with bladder tumor removal on 10/28/2021 at [facility] presents with 2 week history of hematuria, malaise, and lethargy, found to have acute renal failure and hyperkalemia, worsening despite medical therapy, requiring urgent HD.     PLAN  Neuro  -AOx3, no acute issues    Cardiovascular  -No acute issues    Pulmonary  -No acute issues, on RA    GI  -renal diet    Renal  -Lytes stable today, f/u nephro plan for further HD  -F/u uro plan for obstructive uropathy    ID  -no concern for infection at this time  -cont to monitor off abx    Endo  -No acute issues    Heme  -anemia in the setting of hematuria and poor renal function  -cont to trend CBC Q12 and monitor for stability, can transfuse if less than 7     Ppx  -SCDs for now as pt is anemic in the setting of hematuria and will hold off AC at this time - consider restart if stable    Lines/Tubes  -ANEL moses (1/20/22)    GOC  -pt full code at this time      For Followup:  [] F/u HD plan per nephro  [] F/u urology plan for obstructive uropathy  [] If hgb stable, consider restart SQH      Vitals  T(F): 97 (01-21-22 @ 08:00), Max: 97.8 (01-20-22 @ 17:04)  HR: 79 (01-21-22 @ 11:00) (79 - 163)  BP: 114/82 (01-21-22 @ 11:00) (80/55 - 146/64)  BP(mean): --  ABP: --  ABP(mean): --  RR: 14 (01-21-22 @ 11:00) (13 - 20)  SpO2: 100% (01-21-22 @ 11:00) (99% - 100%)  I/O Summary 24H    IN: 1400 mL / OUT: 4700 mL / NET: -3300 mL        MEDICATIONS  (STANDING):  chlorhexidine 2% Cloths 1 Application(s) Topical daily  influenza  Vaccine (HIGH DOSE) 0.7 milliLiter(s) IntraMuscular once  lactulose Syrup 10 Gram(s) Oral two times a day    MEDICATIONS  (PRN):        LABS  CBC 01-21-22 @ 03:11                        8.5    12.47 )-----------( 230                   26.3       Hgb trend: 8.5 <-- , 8.5 <-- , 6.9 <-- , 6.5 <--   WBC trend: 12.47 <-- , 12.81 <-- , 9.13 <-- , 10.21 <--     CMP 01-21-22 @ 03:14    130<L>  |  93<L>  |  64<H>  ----------------------------<  137<H>  5.2   |  22  |  7.62<H>    Ca    8.6      01-21-22 @ 03:14  Phos  5.2     01-21  Mg     2.10     01-21    TPro  6.3  /  Alb  2.6<L>  /  TBili  1.0  /  DBili  x   /  AST  16  /  ALT  17  /  AlkPhos  67  01-21      Serum Cr trend: 7.62 <-- , 7.62 <-- , 10.68 <-- , 9.90 <-- , 10.11 <-- , 9.96 <-- , 9.82 <-- , 9.87 <--   PT/INR - ( 20 Jan 2022 00:21 )   PT: 12.4 sec;   INR: 1.08 ratio         PTT - ( 20 Jan 2022 00:21 ):29.1 sec Hugo Brian MD  Internal Medicine, PGY3  [phone]    MICU Transfer Note    Transfer from: MICU    Transfer to: ( ) Medicine    (x) Telemetry     ( ) RCU        ( ) Palliative         ( ) Stroke Unit          ( ) __________________    Accepting physician: Michael      MICU COURSE:  ANEL aurelia placed and pt had urgent HD, resulting in improvement in hyperkalemia. Electrolytes remained normal afterwards. Urology turned off CBI, no hematuria noted in mendez.         ASSESSMENT & PLAN:     89YOM with PMH of HTN, prostate cancer s/p XRT, and previous cystoscopy with bladder tumor removal on 10/28/2021 at [facility] presents with 2 week history of hematuria, malaise, and lethargy, found to have acute renal failure and hyperkalemia, worsening despite medical therapy, requiring urgent HD.     PLAN  Neuro  -AOx3, no acute issues    Cardiovascular  -No acute issues    Pulmonary  -No acute issues, on RA    GI  -renal diet    Renal  -Lytes stable today, f/u nephro plan for further HD  -F/u uro plan for obstructive uropathy    ID  -no concern for infection at this time  -cont to monitor off abx    Endo  -No acute issues    Heme  -anemia in the setting of hematuria and poor renal function  -cont to trend CBC Q12 and monitor for stability, can transfuse if less than 7     Ppx  -SCDs for now as pt is anemic in the setting of hematuria and will hold off AC at this time - consider restart if stable    Lines/Tubes  -ANEL moses (1/20/22)    GOC  -pt full code at this time      For Followup:  [] F/u HD plan per nephro  [] F/u urology plan for obstructive uropathy  [] If hgb stable, consider restart SQH      Vitals  T(F): 97 (01-21-22 @ 08:00), Max: 97.8 (01-20-22 @ 17:04)  HR: 79 (01-21-22 @ 11:00) (79 - 163)  BP: 114/82 (01-21-22 @ 11:00) (80/55 - 146/64)  BP(mean): --  ABP: --  ABP(mean): --  RR: 14 (01-21-22 @ 11:00) (13 - 20)  SpO2: 100% (01-21-22 @ 11:00) (99% - 100%)  I/O Summary 24H    IN: 1400 mL / OUT: 4700 mL / NET: -3300 mL        MEDICATIONS  (STANDING):  chlorhexidine 2% Cloths 1 Application(s) Topical daily  influenza  Vaccine (HIGH DOSE) 0.7 milliLiter(s) IntraMuscular once  lactulose Syrup 10 Gram(s) Oral two times a day    MEDICATIONS  (PRN):        LABS  CBC 01-21-22 @ 03:11                        8.5    12.47 )-----------( 230                   26.3       Hgb trend: 8.5 <-- , 8.5 <-- , 6.9 <-- , 6.5 <--   WBC trend: 12.47 <-- , 12.81 <-- , 9.13 <-- , 10.21 <--     CMP 01-21-22 @ 03:14    130<L>  |  93<L>  |  64<H>  ----------------------------<  137<H>  5.2   |  22  |  7.62<H>    Ca    8.6      01-21-22 @ 03:14  Phos  5.2     01-21  Mg     2.10     01-21    TPro  6.3  /  Alb  2.6<L>  /  TBili  1.0  /  DBili  x   /  AST  16  /  ALT  17  /  AlkPhos  67  01-21      Serum Cr trend: 7.62 <-- , 7.62 <-- , 10.68 <-- , 9.90 <-- , 10.11 <-- , 9.96 <-- , 9.82 <-- , 9.87 <--   PT/INR - ( 20 Jan 2022 00:21 )   PT: 12.4 sec;   INR: 1.08 ratio         PTT - ( 20 Jan 2022 00:21 ):29.1 sec

## 2022-01-21 NOTE — PROGRESS NOTE ADULT - PROBLEM SELECTOR PLAN 1
Pt. with renal failure in the setting of obstructive uropathy / moderate to severe B/L hydronephrosis with a secondary component of acute blood loss anemia. No prior history of kidney failure. Scr WNL in 2/2019. Scr on admission elevated at 9.87. CT scan findings with clot burden and bladder distention. Scr today at 7.62. Pt started on HD on 01/20 for persistent hyperkalemia. Plan for another session for HD for elevated K levels as outlined below.  Pt remains on CBI and hematuria resolving. Strict I.O. Will need to consider HD if renal failure continues to worsen. Monitor labs and urine output. Avoid NSAIDs, ACEI/ARBS, RCA and nephrotoxins. Dose medications as per eGFR. Urology following for hematuria and hydronephrosis.

## 2022-01-21 NOTE — PROGRESS NOTE ADULT - SUBJECTIVE AND OBJECTIVE BOX
[facility] DIVISION OF KIDNEY DISEASES AND HYPERTENSION -- FOLLOW UP NOTE  --------------------------------------------------------------------------------  HPI: 89 year old male with HTN, ? prostate cancer s/p cystoscopy and mass resection in October 2021 admitted to [facility] with worsening hematuria, malaise and poor appetite. Admission labs relevant for renal failure and hyperkalemia. CT scan with evidence of B/L hydronephrosis, severe on the right side with bladder distention and clot burden. No prior history of kidney disease as per family. Last Scr in 2/2019 was WNL at 1.09. Pt remained persistently hyperkalemic and was initiated on HD on 01/20 and tolerated well.     Pt. seen this AM in MICU, reports feeling okay. serum k elevated at 5.3 and remains on CBI with light pink tinged urine.    PAST HISTORY  --------------------------------------------------------------------------------  No significant changes to PMH, PSH, FHx, SHx, unless otherwise noted    ALLERGIES & MEDICATIONS  --------------------------------------------------------------------------------  Allergies    No Known Allergies    Intolerances    Standing Inpatient Medications  chlorhexidine 2% Cloths 1 Application(s) Topical daily  influenza  Vaccine (HIGH DOSE) 0.7 milliLiter(s) IntraMuscular once  lactulose Syrup 10 Gram(s) Oral two times a day    PRN Inpatient Medications      VITALS/PHYSICAL EXAM  --------------------------------------------------------------------------------  T(C): 36.1 (01-21-22 @ 08:00), Max: 36.6 (01-20-22 @ 17:04)  HR: 84 (01-21-22 @ 13:00) (79 - 163)  BP: 136/80 (01-21-22 @ 13:00) (80/55 - 146/64)  RR: 12 (01-21-22 @ 13:00) (12 - 20)  SpO2: 100% (01-21-22 @ 13:00) (99% - 100%)  Wt(kg): --  Height (cm): 160 (01-19-22 @ 19:55)      01-20-22 @ 07:01  -  01-21-22 @ 07:00  --------------------------------------------------------  IN: 1400 mL / OUT: 4700 mL / NET: -3300 mL      Physical Exam:  	Gen: Frail, ill-appearing  	HEENT: Anicteric  	Pulm: CTA B/L  	CV: S1S2  	Abd: Soft, Distended, supra-pubic tenderness to palpation  	Ext: No LE edema B/L  	Neuro: Awake  	Skin: Warm and dry    LABS/STUDIES  --------------------------------------------------------------------------------              8.5    12.47 >-----------<  230      [01-21-22 @ 03:11]              26.3     130  |  93  |  64  ----------------------------<  137      [01-21-22 @ 03:14]  5.2   |  22  |  7.62        Ca     8.6     [01-21-22 @ 03:14]      Mg     2.10     [01-21-22 @ 03:11]      Phos  5.2     [01-21-22 @ 03:11]            [01-21-22 @ 03:14]        [01-21-22 @ 03:14]  Serum Osmolality 299      [01-20-22 @ 07:25]    Creatinine Trend:  SCr 7.62 [01-21 @ 03:14]  SCr 7.62 [01-21 @ 03:11]  SCr 10.68 [01-20 @ 18:42]  SCr 9.90 [01-20 @ 11:11]  SCr 10.11 [01-20 @ 07:25]    Urinalysis - [01-20-22 @ 02:48]      Color Dark Brown / Appearance Turbid / SG 1.039 / pH 8.5      Gluc Trace / Ketone Negative  / Bili Negative / Urobili <2 mg/dL       Blood Moderate / Protein >600 mg/dL / Leuk Est Negative / Nitrite Negative      RBC >50 / WBC >50 / Hyaline 4-8 / Gran  / Sq Epi  / Non Sq Epi  / Bacteria Moderate    Urine Creatinine <4      [01-20-22 @ 07:25]  Urine Sodium 154      [01-20-22 @ 07:25]  Urine Osmolality 275      [01-20-22 @ 07:25]    Iron 126, TIBC 190, %sat 66      [01-20-22 @ 18:42]  Ferritin 170      [01-21-22 @ 03:14]  HbA1c 5.7      [12-30-18 @ 14:04]

## 2022-01-21 NOTE — PROGRESS NOTE ADULT - PROBLEM SELECTOR PLAN 2
In the setting of renal failure and obstructive uropathy. Serum potassium elevated at 7.2 on admission. Pt was started on HD on 01/20 for persistent hyperkalemia. serum k today borderline high at 5.3. Plan for another session of HD today. Monitor serum K. renal diet.

## 2022-01-21 NOTE — CHART NOTE - NSCHARTNOTEFT_GEN_A_CORE
: Laz (PGY-2), MANUEL (PGY-1), Dr. Braun    INDICATION:     PROCEDURE:  [x] LIMITED ECHO  [x] LIMITED CHEST  [] LIMITED RETROPERITONEAL  [] LIMITED ABDOMINAL  [] LIMITED DVT  [] NEEDLE GUIDANCE VASCULAR  [] NEEDLE GUIDANCE THORACENTESIS  [] NEEDLE GUIDANCE PARACENTESIS  [] NEEDLE GUIDANCE PERICARDIOCENTESIS  [] OTHER    FINDINGS:  - pulm: diffuse b-lines and lung sliding noted b/l. Mild pleural effusion on right, but not on left side  - cardio: unable to measure IVC due to very collapsible IVC. Limited cardiac view.    INTERPRETATION:  - diffuse B-lines bilaterally with mild effusion of the right  - limited cardiac view, but has very collapsible IVC

## 2022-01-21 NOTE — PROGRESS NOTE ADULT - ASSESSMENT
89YOM with PMH of HTN, prostate cancer s/p XRT, and previous cystoscopy with bladder tumor removal on 10/28/2021 at [facility] presents with 2 week history of hematuria, malaise, and lethargy, found to have acute renal failure and hyperkalemia, worsening despite medical therapy, requiring urgent HD.     PLAN  Neuro  -AOx3, no acute issues    Cardiovascular  -No acute issues    Pulmonary  -No acute issues, on RA    GI  -renal diet    Renal  -Lytes stable today, f/u nephro plan for further HD  -F/u uro plan for obstructive uropathy    ID  -no concern for infection at this time  -cont to monitor off abx    Endo  -No acute issues    Heme  -anemia in the setting of hematuria and poor renal function  -cont to trend CBC Q12 and monitor for stability, can transfuse if less than 7     Ppx  -SCDs for now as pt is anemic in the setting of hematuria and will hold off AC at this time - consider restart if stable    Lines/Tubes  -ANEL moses (1/20/22)    GOC  -pt full code at this time

## 2022-01-21 NOTE — PROGRESS NOTE ADULT - SUBJECTIVE AND OBJECTIVE BOX
Hugo Brian MD  Internal Medicine, PGY3  [phone]    MICU Progress Note    Interval Events:    Meds administered:  lactulose Syrup: 10 Gram(s) Oral ( @ 06:45)  lactated ringers Bolus: 1000 mL/Hr IV Bolus ( @ 03:04)  aMIOdarone IVPB: 618 mL/Hr IV Intermittent ( @ 03:04)        REVIEW OF SYSTEMS:  [ ] Unable to assess ROS because ______  [ ] Negative except as stated in HPI  CONSTITUTIONAL: No fever, chills, night sweats, or fatigue  EYES: No eye pain, visual disturbances, or discharge  ENMT:  No difficulty hearing, tinnitus, vertigo; No sinus or throat pain  NECK: No pain or stiffness  BREASTS: No pain, masses, or nipple discharge  RESPIRATORY: No cough, wheezing, or hemoptysis; No shortness of breath  CARDIOVASCULAR: No chest pain, palpitations, dizziness, or leg swelling  GASTROINTESTINAL: No abdominal or epigastric pain. No nausea, vomiting, or hematemesis; No diarrhea or constipation. No melena or hematochezia.  GENITOURINARY: No dysuria, frequency, hematuria, or incontinence  NEUROLOGICAL: No headaches, memory loss, loss of strength, numbness, or tremors  SKIN: No itching, burning, rashes, or lesions   LYMPH NODES: No enlarged glands  ENDOCRINE: No heat or cold intolerance; No hair loss  MUSCULOSKELETAL: No joint pain or swelling; No muscle, back, or extremity pain  PSYCHIATRIC: No depression, anxiety, mood swings, or difficulty sleeping  HEME/LYMPH: No easy bruising, or bleeding gums  ALLERGY AND IMMUNOLOGIC: No hives or eczema    OBJECTIVE:  Vents:       @ 07:01  -   @ 07:00  --------------------------------------------------------  IN: 1400 mL / OUT: 4700 mL / NET: -3300 mL      CAPILLARY BLOOD GLUCOSE  121 (2022 04:00)      POCT Blood Glucose.: 102 mg/dL (2022 11:43)      Drips:    Lines:  See A/P    VITALS:  T(F): 97 (22 @ 08:00), Max: 97.8 (22 @ 17:04)  HR: 79 (22 @ 11:00) (79 - 163)  BP: 114/82 (22 @ 11:00) (80/55 - 146/64)  BP(mean): --  ABP: --  ABP(mean): --  RR: 14 (22 @ 11:00) (13 - 20)  SpO2: 100% (22 @ 11:00) (99% - 100%)    PHYSICAL EXAM:  GENERAL: NAD, lying in bed comfortably  HEAD:  Atraumatic, Normocephalic  EYES: EOMI, PERRLA, conjunctiva and sclera clear  ENT: Moist mucous membranes  NECK: Supple, No JVD  CHEST/LUNG: Clear to auscultation bilaterally; No rales, rhonchi, wheezing, or rubs. Unlabored respirations  HEART: Regular rate and rhythm; No murmurs, rubs, or gallops  ABDOMEN: Bowel sounds present; Soft, Nontender, Nondistended. No hepatomegaly  EXTREMITIES:  2+ Peripheral Pulses, brisk capillary refill. No clubbing, cyanosis, or edema  NERVOUS SYSTEM:  Alert & Oriented X3, speech clear. No deficits   MSK: FROM all 4 extremities, full and equal strength  SKIN: No rashes or lesions    HOSPITAL MEDICATIONS:  Standing Meds:  chlorhexidine 2% Cloths 1 Application(s) Topical daily  influenza  Vaccine (HIGH DOSE) 0.7 milliLiter(s) IntraMuscular once  lactulose Syrup 10 Gram(s) Oral two times a day      PRN Meds:      LABS:  CBC 22 @ 03:11                        8.5    12.47 )-----------( 230                   26.3       Hgb trend: 8.5 <-- , 8.5 <-- , 6.9 <-- , 6.5 <--   WBC trend: 12.47 <-- , 12.81 <-- , 9.13 <-- , 10.21 <--     CMP 22 @ 03:14    130<L>  |  93<L>  |  64<H>  ----------------------------<  137<H>  5.2   |  22  |  7.62<H>    Ca    8.6      - @ 03:14  Phos  5.2       Mg     2.10         TPro  6.3  /  Alb  2.6<L>  /  TBili  1.0  /  DBili  x   /  AST  16  /  ALT  17  /  AlkPhos  67        Serum Cr trend: 7.62 <-- , 7.62 <-- , 10.68 <-- , 9.90 <-- , 10.11 <-- , 9.96 <-- , 9.82 <-- , 9.87 <--   PT/INR - ( 2022 00:21 )   PT: 12.4 sec;   INR: 1.08 ratio         PTT - ( 2022 00:21 )  PTT:29.1 sec  Urinalysis Basic - ( 2022 02:48 )    Color: Dark Brown / Appearance: Turbid / S.039 / pH: x  Gluc: x / Ketone: Negative  / Bili: Negative / Urobili: <2 mg/dL   Blood: x / Protein: >600 mg/dL / Nitrite: Negative   Leuk Esterase: Negative / RBC: >50 /HPF / WBC >50 /HPF   Sq Epi: x / Non Sq Epi: x / Bacteria: Moderate        Venous Blood Gas:   @ 18:42  7.24/45/33/19/54.8  VBG Lactate: 4.5  Venous Blood Gas:   @ 11:11  7.36/28/187/16/98.2  VBG Lactate: 4.1  Venous Blood Gas:   @ 07:25  7.23/42/31/18/46.9  VBG Lactate: 3.2  Venous Blood Gas:   @ 05:00  7.27/37/33/17/45.3  VBG Lactate: 4.0  Venous Blood Gas:   @ 02:50  7.32/37/23/19/37.7  VBG Lactate: 2.2      MICROBIOLOGY:     Culture - Urine (collected 2022 09:03)  Source: Clean Catch Clean Catch (Midstream)  Final Report (2022 11:00):    No growth        RADIOLOGY:  [ ] Reviewed and interpreted by me    EKG:

## 2022-01-21 NOTE — PROGRESS NOTE ADULT - SUBJECTIVE AND OBJECTIVE BOX
Subjective  Unable to be obtained.  CBI crystal clear of very slow gtt CBI --> clamped on AM rounds (discussed with RN).    Objective    Vital signs  T(F): , Max: 98 (01-20-22 @ 12:26)  HR: 91 (01-21-22 @ 07:00)  BP: 114/56 (01-21-22 @ 07:00)  SpO2: 100% (01-21-22 @ 07:00)  Wt(kg): --    Output     OUT:    Voided (mL): 4000 mL  Total OUT: 4000 mL    Total NET: -4000 mL          Gen: NAD  : andrea secured in place, draining crystal clear now CBI clamped    Labs      01-21 @ 03:14    WBC --    / Hct --    / SCr 7.62     01-21 @ 03:11    WBC 12.47 / Hct 26.3  / SCr 7.62

## 2022-01-21 NOTE — PROGRESS NOTE ADULT - PROBLEM SELECTOR PLAN 3
In the setting of severe obstruction and bladder distention serving as stimulus for inappropriate ADH release. SNa 122 on admission. Pt underwent HD on 01/20. SNa today at 130. Monitor Sna daily.

## 2022-01-21 NOTE — PROGRESS NOTE ADULT - ASSESSMENT
89 year old male with a medical history significant for HTN, prostate ca, s/p XRT, and recent cystoscopy 10/28/21 for a bladder tumor, presenting to the ED with hematuria, malaise, lethargy and altered mental status x 2 weeks, in renal failure with bilateral hydro, obstructive uropathy, gross hematuria, catheter placement and manual irrigation with removal of ~200cc of clot, and started on CBI, anemic, receiving transfusion.     -Gross hematuria resolved on CBI, CBI now clamped this AM around 6:30am  -Continue mendez at this time  -Monitor urine color/output, titrate CBI to clear light pink; manually hand irrigate mendez PRN to clear urine --  use pierre (piston syringe) and inject 60 cc of sterile water into the mendez catheter through the drainage tube (do not irrigate via the side port) and draw back for irrigation, repeat until urine clears  -Could consider TOV prior to discharge once improved from acute clinical condition and mentating/ambulating/stooling at baseline, otherwise may follow up outpatient for trial of void 89 year old male with a medical history significant for HTN, prostate ca, s/p XRT, and recent cystoscopy 10/28/21 for a bladder tumor, presenting to the ED with hematuria, malaise, lethargy and altered mental status x 2 weeks, in renal failure with bilateral hydro, obstructive uropathy, gross hematuria, catheter placement and manual irrigation with removal of ~200cc of clot, and started on CBI, anemic, receiving transfusion.     -Gross hematuria resolved on CBI, CBI now clamped this AM around 6:30am  -Continue mendez at this time  -Monitor urine color/output, titrate CBI to clear light pink; manually hand irrigate mendez PRN to clear urine --  use pierre (piston syringe) and inject 60 cc of sterile water into the mendez catheter through the drainage tube (do not irrigate via the side port) and draw back for irrigation, repeat until urine clears  -Would not recommend TOV on this admission, patient can follow up as outpatient for TOV and evaluation or urinary retention  -Discussed with Dr. Yeager

## 2022-01-21 NOTE — CHART NOTE - NSCHARTNOTEFT_GEN_A_CORE
MAR Accept Note    MICU COURSE:  ANEL moses placed and pt had urgent HD, resulting in improvement in hyperkalemia. Electrolytes remained normal afterwards. Urology turned off CBI, no hematuria noted in mendez.     ASSESSMENT & PLAN:   89YOM with PMH of HTN, prostate cancer s/p XRT, and previous cystoscopy with bladder tumor removal on 10/28/2021 at [facility] presents with 2 week history of hematuria, malaise, and lethargy, found to have acute renal failure and hyperkalemia, worsening despite medical therapy, requiring urgent HD.     PLAN  Neuro  -AOx3, no acute issues    Cardiovascular  -No acute issues    Pulmonary  -No acute issues, on RA    GI  -renal diet    Renal  -Lytes stable today, f/u nephro plan for further HD  -F/u uro plan for obstructive uropathy    ID  -no concern for infection at this time  -cont to monitor off abx    Endo  -No acute issues    Heme  -anemia in the setting of hematuria and poor renal function  -cont to trend CBC Q12 and monitor for stability, can transfuse if less than 7     Ppx  -SCDs for now as pt is anemic in the setting of hematuria and will hold off AC at this time - consider restart if stable    Lines/Tubes  -ANEL aurelia (1/20/22)    GOC  -pt full code at this time      For Followup:  [] F/u HD plan per nephro  [] F/u urology plan for obstructive uropathy  [] If hgb stable, consider restart SQH      Vitals  T(F): 97 (01-21-22 @ 08:00), Max: 97.8 (01-20-22 @ 17:04)  HR: 79 (01-21-22 @ 11:00) (79 - 163)  BP: 114/82 (01-21-22 @ 11:00) (80/55 - 146/64)  BP(mean): --  ABP: --  ABP(mean): --  RR: 14 (01-21-22 @ 11:00) (13 - 20)  SpO2: 100% (01-21-22 @ 11:00) (99% - 100%)  I/O Summary 24H    IN: 1400 mL / OUT: 4700 mL / NET: -3300 mL        MEDICATIONS  (STANDING):  chlorhexidine 2% Cloths 1 Application(s) Topical daily  influenza  Vaccine (HIGH DOSE) 0.7 milliLiter(s) IntraMuscular once  lactulose Syrup 10 Gram(s) Oral two times a day    MEDICATIONS  (PRN):        LABS  CBC 01-21-22 @ 03:11                        8.5    12.47 )-----------( 230                   26.3       Hgb trend: 8.5 <-- , 8.5 <-- , 6.9 <-- , 6.5 <--   WBC trend: 12.47 <-- , 12.81 <-- , 9.13 <-- , 10.21 <--     CMP 01-21-22 @ 03:14    130<L>  |  93<L>  |  64<H>  ----------------------------<  137<H>  5.2   |  22  |  7.62<H>    Ca    8.6      01-21-22 @ 03:14  Phos  5.2     01-21  Mg     2.10     01-21    TPro  6.3  /  Alb  2.6<L>  /  TBili  1.0  /  DBili  x   /  AST  16  /  ALT  17  /  AlkPhos  67  01-21      Serum Cr trend: 7.62 <-- , 7.62 <-- , 10.68 <-- , 9.90 <-- , 10.11 <-- , 9.96 <-- , 9.82 <-- , 9.87 <--   PT/INR - ( 20 Jan 2022 00:21 )   PT: 12.4 sec;   INR: 1.08 ratio         PTT - ( 20 Jan 2022 00:21 ):29.1 sec.

## 2022-01-22 NOTE — PROGRESS NOTE ADULT - PROBLEM SELECTOR PLAN 2
In the setting of renal failure and obstructive uropathy. Serum potassium elevated at 7.2 on admission. Pt was started on HD on 01/20 for persistent hyperkalemia. last HD done on 01/21/22 was aborted early due to tachycardia. Plan for HD today with 2k bath. Renal diet. Monitor serum K levels. Pt. with hyperkalemia in the setting of renal failure and obstructive uropathy. Serum potassium elevated at 7.2 on admission. Pt was started on HD on 1/20 for persistent hyperkalemia. Pt. with hyperkalemia on AM labs. Plan for HD today. Low potassium diet. Monitor serum potassium

## 2022-01-22 NOTE — PROGRESS NOTE ADULT - PROBLEM SELECTOR PLAN 7
Diet: Regular  DVT: Holding i/s/o bleeding   Dispo: Pending PT eval  Communication: Diet: Regular  DVT: Holding i/s/o bleeding   Dispo: Pending PT eval  Code: DNR/DNI  Communication: Diet: Regular  DVT: Holding i/s/o bleeding   Dispo: Pending PT eval  Code: DNR/DNI  Communication: Priya Aguilar 1/22/22: 11:26AM Diet: Regular  DVT: Holding i/s/o bleeding   Dispo: Pending PT eval  Code: DNR/DNI  Communication: Nabil Aguilar 1/22/22: 11:26AM

## 2022-01-22 NOTE — PROGRESS NOTE ADULT - SUBJECTIVE AND OBJECTIVE BOX
[facility] DIVISION OF KIDNEY DISEASES AND HYPERTENSION -- FOLLOW UP NOTE  --------------------------------------------------------------------------------  Chief Complaint: KOLBY/Hyperkalemia    24 hour events/subjective: Pt. seen and examined today AM on floors, reports feeling better. Pt remained persistently hyperkalemic despite medical management and was initiated on HD on 01/20/22. Pt. underwent last HD on 01/21/22, however was aborted early in ~40 minutes due to tachycardia. Scr elevated at 8.09 and serum K at 5.4 (elevated).     PAST HISTORY  --------------------------------------------------------------------------------  No significant changes to PMH, PSH, FHx, SHx, unless otherwise noted    ALLERGIES & MEDICATIONS  --------------------------------------------------------------------------------  Allergies    No Known Allergies    Intolerances    Standing Inpatient Medications  chlorhexidine 2% Cloths 1 Application(s) Topical daily  influenza  Vaccine (HIGH DOSE) 0.7 milliLiter(s) IntraMuscular once  lactulose Syrup 10 Gram(s) Oral two times a day    PRN Inpatient Medications  acetaminophen     Tablet .. 650 milliGRAM(s) Oral every 6 hours PRN    REVIEW OF SYSTEMS  --------------------------------------------------------------------------------  Gen: No fevers/chills  Skin: No rashes  Head/Eyes/Ears: Normal hearing,   Respiratory: No dyspnea, cough  CV: No chest pain  GI: No abdominal pain, diarrhea  : hematuria+, as per HPI  MSK: No  edema    All other systems were reviewed and are negative, except as noted.    VITALS/PHYSICAL EXAM  --------------------------------------------------------------------------------  T(C): 36.7 (01-22-22 @ 12:36), Max: 38 (01-22-22 @ 01:20)  HR: 94 (01-22-22 @ 12:36) (71 - 154)  BP: 126/76 (01-22-22 @ 12:36) (93/56 - 152/67)  RR: 18 (01-22-22 @ 12:36) (12 - 18)  SpO2: 98% (01-22-22 @ 12:36) (98% - 100%)  Wt(kg): --    01-21-22 @ 07:01  -  01-22-22 @ 07:00  --------------------------------------------------------  IN: 640 mL / OUT: 497 mL / NET: 143 mL    01-22-22 @ 07:01  -  01-22-22 @ 13:16  --------------------------------------------------------  IN: 180 mL / OUT: 1050 mL / NET: -870 mL    Physical Exam:  	Gen: ill-appearing  	HEENT: Anicteric  	Pulm: CTA B/L  	CV: S1S2  	Abd: Soft, BS+  	Ext: No LE edema B/L  	Neuro: Awake              : Leija with CBI+ with blood tinged urine+  	Skin: Warm and dry    LABS/STUDIES  --------------------------------------------------------------------------------              7.3    11.68 >-----------<  238      [01-22-22 @ 06:43]              21.9     131  |  96  |  56  ----------------------------<  96      [01-22-22 @ 06:43]  5.4   |  24  |  8.09        Ca     8.2     [01-22-22 @ 06:43]      Mg     2.10     [01-22-22 @ 06:43]      Phos  5.6     [01-22-22 @ 06:43]    Creatinine Trend:  SCr 8.09 [01-22 @ 06:43]  SCr 8.09 [01-22 @ 04:00]  SCr 5.97 [01-21 @ 23:54]  SCr 8.62 [01-21 @ 13:54]  SCr 7.62 [01-21 @ 03:14]    Urinalysis - [01-20-22 @ 02:48]      Color Dark Brown / Appearance Turbid / SG 1.039 / pH 8.5      Gluc Trace / Ketone Negative  / Bili Negative / Urobili <2 mg/dL       Blood Moderate / Protein >600 mg/dL / Leuk Est Negative / Nitrite Negative      RBC >50 / WBC >50 / Hyaline 4-8 / Gran  / Sq Epi  / Non Sq Epi  / Bacteria Moderate    Urine Creatinine <4      [01-20-22 @ 07:25]  Urine Sodium 154      [01-20-22 @ 07:25]  Urine Osmolality 275      [01-20-22 @ 07:25]    Iron 126, TIBC 190, %sat 66      [01-20-22 @ 18:42]  Ferritin 170      [01-21-22 @ 03:14]  HbA1c 5.7      [12-30-18 @ 14:04]    HBsAb <3.0      [01-21-22 @ 09:28]  HBsAb Nonreact      [01-21-22 @ 09:29]  HBsAg Nonreact      [01-21-22 @ 09:29]  HBcAb Nonreact      [01-21-22 @ 09:29]  HCV 0.13, Nonreact      [01-21-22 @ 09:28]     [facility] DIVISION OF KIDNEY DISEASES AND HYPERTENSION -- FOLLOW UP NOTE  --------------------------------------------------------------------------------  Chief Complaint: KOLBY/Hyperkalemia    24 hour events/subjective: Pt. seen and examined today AM on floors, reports feeling better. Pt remained persistently hyperkalemic despite medical management and was initiated on HD on 01/20/22. Pt. underwent last HD on 01/21/22, however was aborted early in ~40 minutes due to tachycardia. Scr elevated at 8.09 and serum K at 5.4 (elevated).     PAST HISTORY  --------------------------------------------------------------------------------  No significant changes to PMH, PSH, FHx, SHx, unless otherwise noted    ALLERGIES & MEDICATIONS  --------------------------------------------------------------------------------  Allergies    No Known Allergies    Intolerances    Standing Inpatient Medications  chlorhexidine 2% Cloths 1 Application(s) Topical daily  influenza  Vaccine (HIGH DOSE) 0.7 milliLiter(s) IntraMuscular once  lactulose Syrup 10 Gram(s) Oral two times a day    PRN Inpatient Medications  acetaminophen     Tablet .. 650 milliGRAM(s) Oral every 6 hours PRN    REVIEW OF SYSTEMS  --------------------------------------------------------------------------------  Gen: No fevers/chills  Skin: No rashes  Head/Eyes/Ears: Normal hearing,   Respiratory: No dyspnea, cough  CV: No chest pain  GI: No abdominal pain, diarrhea  : hematuria+, as per HPI  MSK: No  edema    All other systems were reviewed and are negative, except as noted.    VITALS/PHYSICAL EXAM  --------------------------------------------------------------------------------  T(C): 36.7 (01-22-22 @ 12:36), Max: 38 (01-22-22 @ 01:20)  HR: 94 (01-22-22 @ 12:36) (71 - 154)  BP: 126/76 (01-22-22 @ 12:36) (93/56 - 152/67)  RR: 18 (01-22-22 @ 12:36) (12 - 18)  SpO2: 98% (01-22-22 @ 12:36) (98% - 100%)  Wt(kg): --    01-21-22 @ 07:01  -  01-22-22 @ 07:00  --------------------------------------------------------  IN: 640 mL / OUT: 497 mL / NET: 143 mL    01-22-22 @ 07:01  -  01-22-22 @ 13:16  --------------------------------------------------------  IN: 180 mL / OUT: 1050 mL / NET: -870 mL    Physical Exam:  	Gen: ill-appearing  	HEENT: Anicteric  	Pulm: CTA B/L  	CV: S1S2  	Abd: Soft, BS+  	Ext: No LE edema B/L  	Neuro: Awake              : Lieja with CBI+ with blood tinged urine+  	Skin: Warm and dry    LABS/STUDIES  --------------------------------------------------------------------------------              7.3    11.68 >-----------<  238      [01-22-22 @ 06:43]              21.9     131  |  96  |  56  ----------------------------<  96      [01-22-22 @ 06:43]  5.4   |  24  |  8.09        Ca     8.2     [01-22-22 @ 06:43]      Mg     2.10     [01-22-22 @ 06:43]      Phos  5.6     [01-22-22 @ 06:43]    Creatinine Trend:  SCr 8.09 [01-22 @ 06:43]  SCr 8.09 [01-22 @ 04:00]  SCr 5.97 [01-21 @ 23:54]  SCr 8.62 [01-21 @ 13:54]  SCr 7.62 [01-21 @ 03:14]    Urinalysis - [01-20-22 @ 02:48]      Color Dark Brown / Appearance Turbid / SG 1.039 / pH 8.5      Gluc Trace / Ketone Negative  / Bili Negative / Urobili <2 mg/dL       Blood Moderate / Protein >600 mg/dL / Leuk Est Negative / Nitrite Negative      RBC >50 / WBC >50 / Hyaline 4-8 / Gran  / Sq Epi  / Non Sq Epi  / Bacteria Moderate    Urine Creatinine <4      [01-20-22 @ 07:25]  Urine Sodium 154      [01-20-22 @ 07:25]  Urine Osmolality 275      [01-20-22 @ 07:25]    HBsAb <3.0      [01-21-22 @ 09:28]  HBsAb Nonreact      [01-21-22 @ 09:29]  HBsAg Nonreact      [01-21-22 @ 09:29]  HBcAb Nonreact      [01-21-22 @ 09:29]  HCV 0.13, Nonreact      [01-21-22 @ 09:28]

## 2022-01-22 NOTE — PROGRESS NOTE ADULT - SUBJECTIVE AND OBJECTIVE BOX
Subjective  No acute issues overnight. CBI restarted yesterday afternoon, now clear on slow gtt --> titrated down this AM.    Objective    Vital signs  T(F): , Max: 100.4 (01-22-22 @ 01:20)  HR: 91 (01-22-22 @ 08:38)  BP: 106/62 (01-22-22 @ 08:38)  SpO2: 99% (01-22-22 @ 08:38)  Wt(kg): --    Output     OUT:    Indwelling Catheter - Urethral (mL): 380 mL  Total OUT: 380 mL    Total NET: -380 mL          Gen: NAD  Abd: soft, nontender, nondistended  : mendez secured in place, draining irrigant clear with some debris in tubing, no clots or red tinge, CBI titrated to very slow gtt    Labs      01-22 @ 06:43    WBC 11.68 / Hct 21.9  / SCr 8.09     01-22 @ 04:00    WBC 13.22 / Hct 26.8  / SCr 8.09         Culture - Urine (collected 01-20-22 @ 09:03)  Source: Clean Catch Clean Catch (Midstream)  Final Report (01-21-22 @ 11:00):    No growth     Subjective  No acute issues overnight. CBI restarted yesterday afternoon, now clear on slow gtt --> titrated down this AM.  Hct 21 this AM    Objective    Vital signs  T(F): , Max: 100.4 (01-22-22 @ 01:20)  HR: 91 (01-22-22 @ 08:38)  BP: 106/62 (01-22-22 @ 08:38)  SpO2: 99% (01-22-22 @ 08:38)  Wt(kg): --    Output     OUT:    Indwelling Catheter - Urethral (mL): 380 mL  Total OUT: 380 mL    Total NET: -380 mL          Gen: NAD  Abd: soft, nontender, nondistended  : emndez secured in place, draining irrigant clear with some debris in tubing, no clots or red tinge, CBI titrated to very slow gtt    Labs      01-22 @ 06:43    WBC 11.68 / Hct 21.9  / SCr 8.09     01-22 @ 04:00    WBC 13.22 / Hct 26.8  / SCr 8.09         Culture - Urine (collected 01-20-22 @ 09:03)  Source: Clean Catch Clean Catch (Midstream)  Final Report (01-21-22 @ 11:00):    No growth

## 2022-01-22 NOTE — PROGRESS NOTE ADULT - ASSESSMENT
89M Hx HTN, prostate cancer s/p XRT, and previous cystoscopy with bladder tumor removal on 10/28/2021 at [facility] presents with 2 week history of hematuria, malaise, and lethargy found to be in acute renal failure 2/2 heavy clot burden with persistent hyponatremia and hyperkalemia requiring urgent HD now transferred back to floors and may be unable to tolerate HD.

## 2022-01-22 NOTE — PROGRESS NOTE ADULT - PROBLEM SELECTOR PLAN 2
- Renal failure in setting of obstructive uropathy + b/l hydronephrosis + anemia   - no previous history of renal failure  - creatinine 9.87 on admission, latest 5.4 requiring dialysis but may not be able to tolerate   - pt. found to have large clot in bladder causing distention  - Urology consulted, CBI stopped, now pink urine - Renal failure in setting of obstructive uropathy + b/l hydronephrosis + anemia   - no previous history of renal failure  - creatinine 9.87 on admission, latest 5.4 requiring dialysis but may not be able to tolerate   - pt. found to have large clot in bladder causing distention  - Urology consulted, CBI stopped, now pink urine  - of note, tachycardic and low grade fever otherwise not meeting sepsis criteria, will monitor off abx and f.u bcx

## 2022-01-22 NOTE — PROGRESS NOTE ADULT - PROBLEM SELECTOR PLAN 1
-K 7.2 on admission now 5.3 after HD, no EKG changes   - HD per right IJ Rachna; repeat HD session today   - 2/2 ARF likely 2/2 chronic obstruction 2/2 clot burden given B/L hydronephrosis

## 2022-01-22 NOTE — PROGRESS NOTE ADULT - PROBLEM SELECTOR PLAN 1
Pt. with renal failure in the setting of obstructive uropathy / moderate to severe B/L hydronephrosis with a secondary component of acute blood loss anemia. No prior history of kidney failure. Scr WNL in 2/2019. Scr on admission (01/20) elevated at 9.87. Pt started on HD on 01/20 for persistent hyperkalemia. Pt. underwent last HD on 01/21/22, however was aborted early in ~40 minutes due to tachycardia. Scr elevated at 8.09 and serum K at 5.4 (elevated). Monitor labs and urine output. Avoid nephrotoxins. Dose medications as per eGFR. Urology following for hematuria and hydronephrosis. Pt. with KOLBY in the setting of obstructive uropathy/B/L hydronephrosis. No prior history of kidney failure. Scr WNL in 2/2019. Scr on admission (01/20) elevated at 9.87. Pt started on HD on 01/20 for persistent hyperkalemia. Pt. underwent last HD on 01/21/22, however was terminated after ~40 minutes due to tachycardia. Scr elevated at 8.09 and serum K at 5.4 (elevated) today. Pt. clinically stable. Plan for HD today. Urology team following for hematuria and hydronephrosis. Monitor labs and urine output. Avoid nephrotoxins. Dose medications as per eGFR.

## 2022-01-22 NOTE — PROGRESS NOTE ADULT - ASSESSMENT
89 year old male with a medical history significant for HTN, prostate ca, s/p XRT, and recent cystoscopy 10/28/21 for a bladder tumor, presenting to the ED with hematuria, malaise, lethargy and altered mental status x 2 weeks, in renal failure with bilateral hydro, obstructive uropathy, gross hematuria, catheter placement and manual irrigation with removal of ~200cc of clot, and started on CBI, anemic, receiving transfusion.     -Gross hematuria resolved, clamp CBI --> monitor urine output to determine if patient is anuric given persistent KOLBY  -Recommend renal bladder US to assess hydronephrosis  -Document UOP in flowsheets at least q4h  -Monitor urine color, titrate CBI to clear light pink; manually hand irrigate mendez PRN to clear urine --  use pierre (piston syringe) and inject 60 cc of sterile water into the mendez catheter through the drainage tube (do not irrigate via the side port) and draw back for irrigation, repeat until urine clears  -Continue mendez; would not recommend TOV on this admission, patient can follow up as outpatient for TOV and evaluation or urinary retention  -Discussed with Dr. Yeager 89 year old male with a medical history significant for HTN, prostate ca, s/p XRT, and recent cystoscopy 10/28/21 for a bladder tumor, presenting to the ED with hematuria, malaise, lethargy and altered mental status x 2 weeks, in renal failure with bilateral hydro, obstructive uropathy, gross hematuria, catheter placement and manual irrigation with removal of ~200cc of clot, and started on CBI, anemic, receiving transfusion.     -Gross hematuria resolved, clamp CBI --> monitor urine output to determine if patient is anuric given persistent KOLBY  -Recommend renal bladder US to assess hydronephrosis  -Document UOP in flowsheets at least q4h  -Trend H/H; Hct 21 this AM --> gross hematuria resolved thus would not explain decreasing Hct, consider alternative source of anemia?  -Monitor urine color, titrate CBI to clear light pink; manually hand irrigate mendez PRN to clear urine --  use pierre (piston syringe) and inject 60 cc of sterile water into the mendez catheter through the drainage tube (do not irrigate via the side port) and draw back for irrigation, repeat until urine clears  -Continue mendez; would not recommend TOV on this admission, patient can follow up as outpatient for TOV and evaluation or urinary retention  -Discussed with Dr. Yeager

## 2022-01-22 NOTE — PROGRESS NOTE ADULT - PROBLEM SELECTOR PLAN 6
-Hgb 6.5 on admission s/p 2U now 8.5   -likely 2/2 acute blood loss from hematuria for 2 weeks, justin transfuse to Hgb>7 Diet: Regular  DVT: Holding i/s/o bleeding   Dispo: Pending PT eval  Code: DNR/DNI  Communication: Nabil Aguilar 1/22/22: 11:26AM

## 2022-01-22 NOTE — PROGRESS NOTE ADULT - PROBLEM SELECTOR PLAN 3
Prostate cancer s/p XRT, and previous cystoscopy with bladder tumor removal on 10/28/2021 at [facility] - Prostate cancer s/p XRT, and previous cystoscopy with bladder tumor removal on 10/28/2021 at [facility]  -hematuria for 2 weeks, s/p cystoscopy + tumor removal in October  -Leija placed by urology, CBI clamped, no TOV this admission

## 2022-01-22 NOTE — PROGRESS NOTE ADULT - PROBLEM SELECTOR PLAN 5
-Na 122 on admission, now 132 on HD, appropriately corrected -Hgb 6.5 on admission s/p 2U now 8.5   -likely 2/2 acute blood loss from hematuria for 2 weeks, justin transfuse to Hgb>7

## 2022-01-22 NOTE — GOALS OF CARE CONVERSATION - ADVANCED CARE PLANNING - CONVERSATION DETAILS
Spoke with patient at bedside an outlined current hospital stay which includes the fact that he currently has acute renal failure with a lot of electrolyte abnormalities requiring urgent hemodialysis and brief MICU stay. Noted that he did not tolerate HD session last night which does put him at risk of reaccumulating electrolyte derangements and that since we do not yet have a clear reason as to what led to his renal failure, we still have not fixed why he has failure. If it were to be that his derangements are so bad that he is at risk of cardiac arrythmia, patient himself said that he would not want CRP done to him or to be intubated at all. If his blood pressure were to drop however, he would like to have vasopressors. He would not like to have a feeding tube of any sort placed if he were unable to eat. This conversation was also relayed to Nabil Gardiner.

## 2022-01-22 NOTE — PROGRESS NOTE ADULT - SUBJECTIVE AND OBJECTIVE BOX
Dr. Balaji SHARMA:[id number]/NS: [phone]  After 7pm please page [phone] or [phone]    GEOFFREY FREEMAN  89y  MRN: [id number]    Subjective:    Patient is a 89y old  Male who presents with a chief complaint of Hematuria, AMS (21 Jan 2022 14:17)    Spoke with Patient at Bedside. Overnight patient was tachycardic to 150s during HD which he stopped. Urine is still light pink. No active complaints. Patient denies Ha/F/N/V/CP/SOB/Abd Pain/D/Dysuria/Swelling. Patient wishes to be DNR/DNI please see GOC note.       MEDICATIONS  (STANDING):  chlorhexidine 2% Cloths 1 Application(s) Topical daily  influenza  Vaccine (HIGH DOSE) 0.7 milliLiter(s) IntraMuscular once  lactulose Syrup 10 Gram(s) Oral two times a day    MEDICATIONS  (PRN):  acetaminophen     Tablet .. 650 milliGRAM(s) Oral every 6 hours PRN Temp greater or equal to 38C (100.4F), Mild Pain (1 - 3), Moderate Pain (4 - 6)        Objective:    Vitals: Vital Signs Last 24 Hrs  T(C): 36.8 (01-22-22 @ 08:38), Max: 38 (01-22-22 @ 01:20)  T(F): 98.2 (01-22-22 @ 08:38), Max: 100.4 (01-22-22 @ 01:20)  HR: 91 (01-22-22 @ 08:38) (70 - 154)  BP: 106/62 (01-22-22 @ 08:38) (93/56 - 152/67)  BP(mean): 87 (01-21-22 @ 20:00) (86 - 97)  RR: 18 (01-22-22 @ 08:38) (12 - 18)  SpO2: 99% (01-22-22 @ 08:38) (98% - 100%)            I&O's Summary    21 Jan 2022 07:01  -  22 Jan 2022 07:00  --------------------------------------------------------  IN: 640 mL / OUT: 497 mL / NET: 143 mL        PHYSICAL EXAM:  GENERAL: NAD, lying in bed comfortably, thin appearance  HEAD:  Atraumatic, normocephalic  EYES: EOMI, PERRLA, conjunctiva and sclera clear  ENT: Moist mucous membranes  NECK: Supple, no JVD, right shiley   HEART: Regular rate and rhythm, no murmurs, rubs, or gallops  LUNGS: Unlabored respirations. Mild wheeze b/l anteriorly but otherwise clear to auscultation bilaterally  ABDOMEN: Soft, nontender, mildly distended, +BS  : light pink urine   EXTREMITIES: 2+ peripheral pulses bilaterally. No clubbing, cyanosis, or edema  NERVOUS SYSTEM:  A&Ox3, no focal deficits   SKIN: No rashes or lesions    LABS:                        7.3    11.68 )-----------( 238      ( 22 Jan 2022 06:43 )             21.9                         8.4    13.22 )-----------( 269      ( 22 Jan 2022 04:00 )             26.8                         7.8    11.02 )-----------( 249      ( 21 Jan 2022 23:54 )             23.5     Hgb Trend: 7.3<--, 8.4<--, 7.8<--, 8.5<--, 8.5<--  01-22    131<L>  |  96<L>  |  56<H>  ----------------------------<  96  5.4<H>   |  24  |  8.09<H>  01-22    131<L>  |  96<L>  |  56<H>  ----------------------------<  96  5.4<H>   |  24  |  8.09<H>  01-21    135  |  100  |  42<H>  ----------------------------<  109<H>  4.1   |  27  |  5.97<H>    Ca    8.2<L>      22 Jan 2022 06:43  Ca    8.2<L>      22 Jan 2022 04:00  Ca    7.9<L>      21 Jan 2022 23:54  Phos  5.6     01-22  Mg     2.10     01-22    TPro  5.9<L>  /  Alb  2.4<L>  /  TBili  0.7  /  DBili  x   /  AST  17  /  ALT  16  /  AlkPhos  74  01-22  TPro  5.6<L>  /  Alb  2.4<L>  /  TBili  0.6  /  DBili  x   /  AST  14  /  ALT  13  /  AlkPhos  62  01-21  TPro  6.3  /  Alb  2.6<L>  /  TBili  1.0  /  DBili  x   /  AST  16  /  ALT  17  /  AlkPhos  67  01-21    Creatinine Trend: 8.09<--, 8.09<--, 5.97<--, 8.62<--, 7.62<--, 7.62<--  PT/INR - ( 22 Jan 2022 04:00 )   PT: 12.6 sec;   INR: 1.10 ratio         PTT - ( 22 Jan 2022 04:00 )  PTT:32.9 sec                  CAPILLARY BLOOD GLUCOSE      POCT Blood Glucose.: 91 mg/dL (22 Jan 2022 08:44)  POCT Blood Glucose.: 108 mg/dL (21 Jan 2022 22:48)  POCT Blood Glucose.: 122 mg/dL (21 Jan 2022 17:29)  POCT Blood Glucose.: 102 mg/dL (21 Jan 2022 11:43)

## 2022-01-22 NOTE — PROGRESS NOTE ADULT - PROBLEM SELECTOR PLAN 4
-hematuria for 2 weeks, s/p cystoscopy + tumor removal in October  -Leija placed by urology, CBI clamped, no TOV this admission -Na 122 on admission, now 132 on HD, appropriately corrected

## 2022-01-22 NOTE — CHART NOTE - NSCHARTNOTEFT_GEN_A_CORE
Renal bladder US images reviewed. Bilateral hydronephrosis is improving, now moderate (from severe) right and mild left (from moderate). These findings are appropriate as we would not expect to see complete resolution of hydro at this time. Favor against obstructive uropathy as etiology of KOLBY as hydronephrosis is improving. Please see progress note from this AM for full assessment and plan.      < from: US Kidney and Bladder (01.22.22 @ 15:46) >      ACC: [id number] EXAM:  US KIDNEYS AND BLADDER                          PROCEDURE DATE:  01/22/2022          INTERPRETATION:  CLINICAL INFORMATION: Acute renal failure.    COMPARISON: Correlation is made with abdominal and pelvic CT dated   1/20/2022.    TECHNIQUE: Sonography of the kidneys and bladder.    FINDINGS:    Right kidney: 10.4 cm. Moderate hydronephrosis.    Left kidney: 9.4 cm. Mild to moderate hydronephrosis.    Urinary bladder: Collapsed with a Leija catheter, limiting evaluation.    IMPRESSION:    Moderate right hydronephrosis and mild to moderate left hydronephrosis.    Urinary bladder is collapsed with a Leija catheter, limiting evaluation.        --- End of Report ---            DAKOTA RIOS MD; Attending Radiologist  This document has been electronically signed. Jan 22 2022  4:05PM    < end of copied text > Renal bladder US images reviewed. Bilateral hydronephrosis is improving, now moderate (from severe) right and mild left (from moderate). These findings are appropriate as we would not expect to see complete resolution of hydro at this time. Please see progress note from this AM for full assessment and plan.      < from: US Kidney and Bladder (01.22.22 @ 15:46) >      ACC: [id number] EXAM:  US KIDNEYS AND BLADDER                          PROCEDURE DATE:  01/22/2022          INTERPRETATION:  CLINICAL INFORMATION: Acute renal failure.    COMPARISON: Correlation is made with abdominal and pelvic CT dated   1/20/2022.    TECHNIQUE: Sonography of the kidneys and bladder.    FINDINGS:    Right kidney: 10.4 cm. Moderate hydronephrosis.    Left kidney: 9.4 cm. Mild to moderate hydronephrosis.    Urinary bladder: Collapsed with a Leija catheter, limiting evaluation.    IMPRESSION:    Moderate right hydronephrosis and mild to moderate left hydronephrosis.    Urinary bladder is collapsed with a Leija catheter, limiting evaluation.        --- End of Report ---            DAKOTA RIOS MD; Attending Radiologist  This document has been electronically signed. Jan 22 2022  4:05PM    < end of copied text > Renal bladder US images reviewed. Bilateral hydronephrosis is improving, now moderate (from severe) right and mild left (from moderate). These findings are appropriate as we would not expect to see complete resolution of hydro at this time. KOLBY may be multifactorial, however given improving hydro would not recommend any urgent intervention at this time. Recommend continuing conservative management, trending Cr, and monitoring urine output and color at this time. Please see progress note from this AM for full assessment and plan.      < from: US Kidney and Bladder (01.22.22 @ 15:46) >      ACC: [id number] EXAM:  US KIDNEYS AND BLADDER                          PROCEDURE DATE:  01/22/2022          INTERPRETATION:  CLINICAL INFORMATION: Acute renal failure.    COMPARISON: Correlation is made with abdominal and pelvic CT dated   1/20/2022.    TECHNIQUE: Sonography of the kidneys and bladder.    FINDINGS:    Right kidney: 10.4 cm. Moderate hydronephrosis.    Left kidney: 9.4 cm. Mild to moderate hydronephrosis.    Urinary bladder: Collapsed with a Leija catheter, limiting evaluation.    IMPRESSION:    Moderate right hydronephrosis and mild to moderate left hydronephrosis.    Urinary bladder is collapsed with a Leija catheter, limiting evaluation.        --- End of Report ---            DAKOTA RIOS MD; Attending Radiologist  This document has been electronically signed. Jan 22 2022  4:05PM    < end of copied text > Renal bladder US images reviewed. Bilateral hydronephrosis is improving, now moderate (from severe) right and mild left (from moderate), which is appropriate as we would not expect to see complete resolution of hydro at this time. KOLBY may be multifactorial, however given improving hydro as well as 350cc urine output this morning, recommend continuing conservative management at this time. Please refer progress note from this AM for full assessment and plan.      < from: US Kidney and Bladder (01.22.22 @ 15:46) >      ACC: [id number] EXAM:  US KIDNEYS AND BLADDER                          PROCEDURE DATE:  01/22/2022          INTERPRETATION:  CLINICAL INFORMATION: Acute renal failure.    COMPARISON: Correlation is made with abdominal and pelvic CT dated   1/20/2022.    TECHNIQUE: Sonography of the kidneys and bladder.    FINDINGS:    Right kidney: 10.4 cm. Moderate hydronephrosis.    Left kidney: 9.4 cm. Mild to moderate hydronephrosis.    Urinary bladder: Collapsed with a Leija catheter, limiting evaluation.    IMPRESSION:    Moderate right hydronephrosis and mild to moderate left hydronephrosis.    Urinary bladder is collapsed with a Leija catheter, limiting evaluation.        --- End of Report ---            DAKOTA RIOS MD; Attending Radiologist  This document has been electronically signed. Jan 22 2022  4:05PM    < end of copied text >

## 2022-01-22 NOTE — PROGRESS NOTE ADULT - ASSESSMENT
Pt. with renal failure and hyperkalemia Pt. with KOLBY and hyperkalemia in setting of obstructive uropathy.

## 2022-01-23 NOTE — PROGRESS NOTE ADULT - PROBLEM SELECTOR PLAN 4
-Na 122 on admission, now 132 on HD, appropriately corrected -Na 122 on admission, now 130 s/p HD, appropriately corrected

## 2022-01-23 NOTE — PROGRESS NOTE ADULT - PROBLEM SELECTOR PLAN 5
-Hgb 6.5 on admission s/p 2U now 8.5   -likely 2/2 acute blood loss from hematuria for 2 weeks, justin transfuse to Hgb>7 -Hgb 6.5 on admission s/p 2U now 7.2  -likely 2/2 acute blood loss from hematuria for 2 weeks, will transfuse to Hgb>7

## 2022-01-23 NOTE — PROGRESS NOTE ADULT - SUBJECTIVE AND OBJECTIVE BOX
DAILY PROGRESS NOTE:         24 hr events:  urine flowing clear well, off cbi.   no suprapubic or flank pain.   s/p HD yesterday.     Objective:    Vital Signs Last 24 Hrs  T(C): 36.8 (23 Jan 2022 05:30), Max: 37.8 (22 Jan 2022 17:26)  T(F): 98.2 (23 Jan 2022 05:30), Max: 100.1 (22 Jan 2022 22:00)  HR: 82 (23 Jan 2022 05:30) (82 - 167)  BP: 125/71 (23 Jan 2022 05:30) (100/74 - 128/66)  BP(mean): 92 (22 Jan 2022 12:36) (92 - 92)  RR: 18 (23 Jan 2022 05:30) (18 - 18)  SpO2: 100% (23 Jan 2022 05:30) (98% - 100%)    I&O's Detail    22 Jan 2022 07:01  -  23 Jan 2022 07:00  --------------------------------------------------------  IN:    Instillation (mL): 180 mL    Other (mL): 400 mL  Total IN: 580 mL    OUT:    Continuous Bladder Irrigation (mL): 700 mL    Indwelling Catheter - Urethral (mL): 425 mL    Other (mL): 85 mL  Total OUT: 1210 mL    Total NET: -630 mL          Physical Exam:    General: NAD, well-nourished  Resp: Breathing comfortably on RA  CV: regular rate   : urine dark, concentrated however with clots. flowing well off cbi.   Psych: AOx3  Neuro: No focal deficits       Laboratory Results:                          7.2    10.11 )-----------( 235      ( 23 Jan 2022 05:01 )             22.8     01-23    130<L>  |  95<L>  |  52<H>  ----------------------------<  103<H>  4.9   |  25  |  8.59<H>    Ca    7.4<L>      23 Jan 2022 05:01  Phos  4.7     01-23  Mg     2.20     01-23    TPro  5.9<L>  /  Alb  2.4<L>  /  TBili  0.7  /  DBili  x   /  AST  17  /  ALT  16  /  AlkPhos  74  01-22    PT/INR - ( 22 Jan 2022 04:00 )   PT: 12.6 sec;   INR: 1.10 ratio         PTT - ( 22 Jan 2022 04:00 )  PTT:32.9 sec

## 2022-01-23 NOTE — PROGRESS NOTE ADULT - PROBLEM SELECTOR PLAN 2
- Renal failure in setting of obstructive uropathy + b/l hydronephrosis + anemia   - no previous history of renal failure  - creatinine 9.87 on admission, latest 5.4 requiring dialysis but may not be able to tolerate   - pt. found to have large clot in bladder causing distention  - Urology consulted, CBI stopped, now pink urine  - of note, tachycardic and low grade fever otherwise not meeting sepsis criteria, will monitor off abx and f.u bcx - Renal failure in setting of obstructive uropathy + b/l hydronephrosis + anemia   - no previous history of renal failure  - creatinine 9.87 on admission, latest 4.9, concern regarding pt ability to tolerate dialysis.   - pt. found to have large clot in bladder causing distention  - Urology consulted, CBI stopped, now pink urine  - of note, tachycardic and low grade fever 99 oral 1/23, will monitor off abx. BCx negative to date, ordered ice packs for patient. - Renal failure in setting of obstructive uropathy + b/l hydronephrosis + anemia   - no previous history of renal failure  - creatinine 9.87 on admission, latest 4.9, concern regarding pt ability to tolerate dialysis.   - pt. found to have large clot in bladder causing distention  - Urology consulted, CBI stopped, now pink urine, recommend q4 UOP checks  - of note, tachycardic and low grade fever 99 oral 1/23, will monitor off abx. BCx negative to date, ordered ice packs for patient. - Renal failure in setting of obstructive uropathy + b/l hydronephrosis + anemia   - no previous history of renal failure  - creatinine 9.87 on admission, latest 4.9, concern regarding pt ability to tolerate dialysis.  - No HD today per nephrology  - pt. found to have large clot in bladder causing distention  - Urology consulted, CBI stopped, now pink urine, recommend q4 UOP checks  - of note, tachycardic and low grade fever 99 oral 1/23, will monitor off abx. BCx negative to date, ordered ice packs for patient.

## 2022-01-23 NOTE — PROGRESS NOTE ADULT - PROBLEM SELECTOR PLAN 2
Pt. with hyperkalemia in the setting of renal failure and obstructive uropathy. Serum potassium elevated at 7.2 on admission. Pt was started on HD on 1/20 for persistent hyperkalemia. Last HD done 01/22/22. Serum K today at 4.9 (WNL). No plan for HD today. Low potassium diet. Monitor serum potassium. Pt. with hyperkalemia in the setting of renal failure and obstructive uropathy. Serum potassium elevated at 7.2 on admission. Pt. initiated on HD on 1/20 for persistent hyperkalemia. Last HD done 01/22/22. Serum potassium WNL at 4.9 (WNL) today. No plan for HD today. Low potassium diet. Monitor serum potassium.

## 2022-01-23 NOTE — PROGRESS NOTE ADULT - ASSESSMENT
89 year old male with a medical history significant for HTN, prostate ca, s/p XRT, and recent cystoscopy 10/28/21 for a bladder tumor, presenting to the ED with hematuria, malaise, lethargy and altered mental status x 2 weeks, in renal failure with bilateral hydro, obstructive uropathy, gross hematuria, catheter placement and manual irrigation with removal of ~200cc of clot, and started on CBI, anemic, receiving transfusion.     -Gross hematuria resolved. CBI clamped and ~430cc in 24 hrs, no UOP recorded overnight. CBI disconnected  -Recommend renal bladder US to assess hydronephrosis -- see chart note. However hydro improved.   -Document UOP in flowsheets at least q4h  -Monitor urine color, manually hand irrigate mendez PRN to clear urine --  use pierre (piston syringe) and inject 60 cc of sterile water into the mendez catheter through the drainage tube (do not irrigate via the side port) and draw back for irrigation, repeat until urine clears  -Continue mendez; would not recommend TOV on this admission, patient can follow up as outpatient for TOV and evaluation or urinary retention  -Discussed with Dr. Yeager

## 2022-01-23 NOTE — PROGRESS NOTE ADULT - SUBJECTIVE AND OBJECTIVE BOX
Chente Saleh MD  Internal Medicine PGY-1  Pager NS: [phone] // [facility]: [phone]    PROGRESS NOTE:     Patient is a 89y old  Male who presents with a chief complaint of Hematuria, AMS (22 Jan 2022 13:15)      SUBJECTIVE / OVERNIGHT EVENTS:    No acute events overnight. Patient examined at bedside with no acute complaints.     REVIEW OF SYSTEMS:    CONSTITUTIONAL: No weakness, fevers or chills  EYES/ENT: No visual changes;  No vertigo or throat pain   NECK: No pain or stiffness  RESPIRATORY: No cough, wheezing, hemoptysis; No shortness of breath  CARDIOVASCULAR: No chest pain or palpitations  GASTROINTESTINAL: No abdominal or epigastric pain. No nausea, vomiting, or hematemesis; No diarrhea or constipation. No melena or hematochezia.  GENITOURINARY: No dysuria, frequency or hematuria  NEUROLOGICAL: No numbness or weakness  SKIN: No itching, rashes      MEDICATIONS  (STANDING):  chlorhexidine 2% Cloths 1 Application(s) Topical daily  influenza  Vaccine (HIGH DOSE) 0.7 milliLiter(s) IntraMuscular once  lactulose Syrup 10 Gram(s) Oral two times a day    MEDICATIONS  (PRN):  acetaminophen     Tablet .. 650 milliGRAM(s) Oral every 6 hours PRN Temp greater or equal to 38C (100.4F), Mild Pain (1 - 3), Moderate Pain (4 - 6)      CAPILLARY BLOOD GLUCOSE      POCT Blood Glucose.: 91 mg/dL (22 Jan 2022 08:44)    I&O's Summary    22 Jan 2022 07:01  -  23 Jan 2022 07:00  --------------------------------------------------------  IN: 580 mL / OUT: 1210 mL / NET: -630 mL        VITALS:   T(C): 36.9 (01-23-22 @ 01:50), Max: 37.8 (01-22-22 @ 17:26)  HR: 90 (01-23-22 @ 01:50) (84 - 167)  BP: 117/66 (01-23-22 @ 01:50) (100/74 - 128/66)  RR: 18 (01-23-22 @ 01:50) (18 - 18)  SpO2: 98% (01-23-22 @ 01:50) (98% - 100%)    GENERAL: NAD, lying in bed comfortably  HEAD:  Atraumatic, normocephalic  EYES: EOMI, PERRLA, conjunctiva and sclera clear  ENT: Moist mucous membranes  NECK: Supple, no JVD  HEART: Regular rate and rhythm, no murmurs, rubs, or gallops  LUNGS: Unlabored respirations.  Clear to auscultation bilaterally, no crackles, wheezing, or rhonchi  ABDOMEN: Soft, nontender, nondistended, +BS  EXTREMITIES: 2+ peripheral pulses bilaterally. No clubbing, cyanosis, or edema  NERVOUS SYSTEM:  A&Ox3, no focal deficits   SKIN: No rashes or lesions    LABS:                        7.2    10.11 )-----------( 235      ( 23 Jan 2022 05:01 )             22.8     01-23    130<L>  |  95<L>  |  52<H>  ----------------------------<  103<H>  4.9   |  25  |  8.59<H>    Ca    7.4<L>      23 Jan 2022 05:01  Phos  4.7     01-23  Mg     2.20     01-23    TPro  5.9<L>  /  Alb  2.4<L>  /  TBili  0.7  /  DBili  x   /  AST  17  /  ALT  16  /  AlkPhos  74  01-22    PT/INR - ( 22 Jan 2022 04:00 )   PT: 12.6 sec;   INR: 1.10 ratio         PTT - ( 22 Jan 2022 04:00 )  PTT:32.9 sec          Culture - Urine (collected 20 Jan 2022 09:03)  Source: Clean Catch Clean Catch (Midstream)  Final Report (21 Jan 2022 11:00):    No growth        RADIOLOGY & ADDITIONAL TESTS:  Results Reviewed:   Imaging Personally Reviewed:  Electrocardiogram Personally Reviewed:    COORDINATION OF CARE:  Care Discussed with Consultants/Other Providers [Y/N]:  Prior or Outpatient Records Reviewed [Y/N]:   Chente Saleh MD  Internal Medicine PGY-1  Pager NS: [phone] // [facility]: [phone]    PROGRESS NOTE:     Patient is a 89y old  Male who presents with a chief complaint of Hematuria, AMS (22 Jan 2022 13:15)      SUBJECTIVE / OVERNIGHT EVENTS:    No acute events overnight. Patient examined at bedside with no acute complaints, resting comfortably in bed. Pt. made DNR/DNI yesterday.     REVIEW OF SYSTEMS:    CONSTITUTIONAL: No weakness, fevers or chills  EYES/ENT: No visual changes;  No vertigo or throat pain   NECK: No pain or stiffness  RESPIRATORY: No cough, wheezing, hemoptysis; No shortness of breath  CARDIOVASCULAR: No chest pain or palpitations  GASTROINTESTINAL: No abdominal or epigastric pain. No nausea, vomiting, or hematemesis; No diarrhea or constipation. No melena or hematochezia.  GENITOURINARY: No dysuria, frequency, +hematuria  NEUROLOGICAL: No numbness or weakness  SKIN: No itching, rashes      MEDICATIONS  (STANDING):  chlorhexidine 2% Cloths 1 Application(s) Topical daily  influenza  Vaccine (HIGH DOSE) 0.7 milliLiter(s) IntraMuscular once  lactulose Syrup 10 Gram(s) Oral two times a day    MEDICATIONS  (PRN):  acetaminophen     Tablet .. 650 milliGRAM(s) Oral every 6 hours PRN Temp greater or equal to 38C (100.4F), Mild Pain (1 - 3), Moderate Pain (4 - 6)      CAPILLARY BLOOD GLUCOSE      POCT Blood Glucose.: 91 mg/dL (22 Jan 2022 08:44)    I&O's Summary    22 Jan 2022 07:01  -  23 Jan 2022 07:00  --------------------------------------------------------  IN: 580 mL / OUT: 1210 mL / NET: -630 mL        VITALS:   T(C): 36.9 (01-23-22 @ 01:50), Max: 37.8 (01-22-22 @ 17:26)  HR: 90 (01-23-22 @ 01:50) (84 - 167)  BP: 117/66 (01-23-22 @ 01:50) (100/74 - 128/66)  RR: 18 (01-23-22 @ 01:50) (18 - 18)  SpO2: 98% (01-23-22 @ 01:50) (98% - 100%)    GENERAL: NAD, lying in bed comfortably  HEAD:  Atraumatic, normocephalic  EYES: EOMI, PERRLA, conjunctiva and sclera clear  ENT: Moist mucous membranes  NECK: Supple, no JVD  HEART: Regular rate and rhythm, no murmurs, rubs, or gallops  LUNGS: Unlabored respirations.  Clear to auscultation bilaterally, no crackles, wheezing, or rhonchi  ABDOMEN: Soft, nontender, nondistended, +BS  EXTREMITIES: 2+ peripheral pulses bilaterally. No clubbing, cyanosis, or edema  NERVOUS SYSTEM:  A&Ox3, no focal deficits   SKIN: No rashes or lesions  : Leija output dark pink w/ clots    LABS:                        7.2    10.11 )-----------( 235      ( 23 Jan 2022 05:01 )             22.8     01-23    130<L>  |  95<L>  |  52<H>  ----------------------------<  103<H>  4.9   |  25  |  8.59<H>    Ca    7.4<L>      23 Jan 2022 05:01  Phos  4.7     01-23  Mg     2.20     01-23    TPro  5.9<L>  /  Alb  2.4<L>  /  TBili  0.7  /  DBili  x   /  AST  17  /  ALT  16  /  AlkPhos  74  01-22    PT/INR - ( 22 Jan 2022 04:00 )   PT: 12.6 sec;   INR: 1.10 ratio         PTT - ( 22 Jan 2022 04:00 )  PTT:32.9 sec          Culture - Urine (collected 20 Jan 2022 09:03)  Source: Clean Catch Clean Catch (Midstream)  Final Report (21 Jan 2022 11:00):    No growth        RADIOLOGY & ADDITIONAL TESTS:  Results Reviewed:   Imaging Personally Reviewed:  Electrocardiogram Personally Reviewed:    COORDINATION OF CARE:  Care Discussed with Consultants/Other Providers [Y/N]:  Prior or Outpatient Records Reviewed [Y/N]:

## 2022-01-23 NOTE — DISCHARGE NOTE PROVIDER - HOSPITAL COURSE
89YOM with PMH of HTN, prostate cancer s/p XRT, and previous cystoscopy with bladder tumor removal on 10/28/2021 at [facility] presents with 2 week history of hematuria, malaise, and lethargy. Per patient's niece, the patient had recovered well from the cystoscopy in October but was known to have persistent anemia after the procedure. She reports that he was otherwise well, ambulating independently and A&Ox3 until appx. 2 weeks ago when he began to have hematuria, increased urinary frequency, and was witnessed to be more lethargic and confused at which point he was brought to the ED. Patient defers medical decision making to niece at this time who wishes for patient to remain full code.    In the ED the patient was found to have anemia with Hgb of 6.5, K of 7.2, and Cr of 9.87. Lactate was initially elevated at 2.2 but uptrended to 4.0. He was given a unit of PRBC to address the anemia. EKG showed no acute changes of hyperkalemia and he received calcium gluconate, albuterol, lasix, insulin, and dextrose without change in K level, but patient remained stable without complaints. Renal was consulted for the patient's hyperkalemia and KOLBY and was re-dosed with calcium, lokelma, insulin, dextrose, and albuterol. Ultrasound was conducted for low UOP that showed bilateral hydronephrosis and substantial blood clot in bladder with associated distention + hydroureter. Urology was consulted and were able to extract 200cc clot from the bladder and start CBI, however the patient's potassium and creatinine remained significantly elevated and he was taken to the ICU for emergent dialysis and central line placement.    MICU COURSE:  ANEL moses placed and pt had urgent HD, resulting in improvement in hyperkalemia. Electrolytes remained normal afterwards. Urology turned off CBI, no hematuria noted in mendez.     The patient's electrolytes were deemed stable enough for transfer back to floors. The patient went for second dialysis evening of 1/21 but was stopped early due to pt. tachycardia and low grade fever. Blood cultures were sent that did not show any growth. A third dialysis session was attempted 1/22 however pt was tachycardic during session and HD was aborted after 45 minutes. Urology recommended q4 urine output checks.

## 2022-01-23 NOTE — PROGRESS NOTE ADULT - PROBLEM SELECTOR PLAN 3
- Prostate cancer s/p XRT, and previous cystoscopy with bladder tumor removal on 10/28/2021 at [facility]  -hematuria for 2 weeks, s/p cystoscopy + tumor removal in October  -Leija placed by urology, CBI clamped, no TOV this admission

## 2022-01-23 NOTE — DISCHARGE NOTE PROVIDER - NSDCCPCAREPLAN_GEN_ALL_CORE_FT
PRINCIPAL DISCHARGE DIAGNOSIS  Diagnosis: Hyperkalemia  Assessment and Plan of Treatment: You were found to have a very high potassium level in your blood, likely due to renal failure that occurred because of the bladder obstruction due to clot. You received insulin, albuterol, D5 fluids, calcium gluconate, and lokelma to try and bring your potassium level down but they were ineffective, so you were admitted to the ICU where you received dialysis through a central IV line placed through your neck. Your potassium improved after dialysis.      SECONDARY DISCHARGE DIAGNOSES  Diagnosis: Renal failure  Assessment and Plan of Treatment: Your kidneys were found to be failing likely due to the clot obstructing your bladder. Our urologists came to remove as much clot as they could but your kidneys did not improve. Our nephrologists were consulted to oversee your dialysis. You tolerated one session of dialysis in the MICU but you could not tolerate any more sessions of dialysis the following day.    Diagnosis: Gross hematuria  Assessment and Plan of Treatment: You were having 2 weeks of blood in your urine prior to admission likely due to a clot and obstruction that may have formed after your cystoscopy and tumor removal last October. Our urologists saw you and removed 200mL of clot and placed mendez catheter that they irrigated and we were able to remove some urine from your bladder. After hemodialysis our urologists recommended stopping the bladder irrigation.    Diagnosis: Anemia due to acute blood loss  Assessment and Plan of Treatment: You were found to have a very low blood level when you came to the hospital, likely due to the blood you were expelling through your urine. We gave you 2 units of blood and your anemia improved and stabilized.

## 2022-01-23 NOTE — PROGRESS NOTE ADULT - ASSESSMENT
89M Hx HTN, prostate cancer s/p XRT, and previous cystoscopy with bladder tumor removal on 10/28/2021 at [facility] presents with 2 week history of hematuria, malaise, and lethargy found to be in acute renal failure 2/2 heavy clot burden with persistent hyponatremia and hyperkalemia requiring urgent HD now transferred back to floors and may be unable to tolerate HD.

## 2022-01-23 NOTE — DISCHARGE NOTE PROVIDER - NSDCCPTREATMENT_GEN_ALL_CORE_FT
PRINCIPAL PROCEDURE  Procedure: US kidney and bladder  Findings and Treatment: PROCEDURE DATE:  01/22/2022    INTERPRETATION:  CLINICAL INFORMATION: Acute renal failure.  COMPARISON: Correlation is made with abdominal and pelvic CT dated   1/20/2022.  TECHNIQUE: Sonography of the kidneys and bladder.  FINDINGS:  Right kidney: 10.4 cm. Moderate hydronephrosis.  Left kidney: 9.4 cm. Mild to moderate hydronephrosis.  Urinary bladder: Collapsed with a Leija catheter, limiting evaluation.  IMPRESSION:  Moderate right hydronephrosis and mild to moderate left hydronephrosis.  Urinary bladder is collapsed with a Leija catheter, limiting evaluation.  --- End of Report ---

## 2022-01-23 NOTE — PROGRESS NOTE ADULT - PROBLEM SELECTOR PLAN 6
Diet: Regular  DVT: Holding i/s/o bleeding   Dispo: Pending PT eval  Code: DNR/DNI  Communication: Nabil Aguilar 1/22/22: 11:26AM Diet: Regular  DVT: Holding i/s/o bleeding   Dispo: Pending PT eval  Code: DNR/DNI  Communication: Diet: Regular  DVT: Holding i/s/o bleeding   Dispo: Pending PT eval  Code: DNR/DNI  Communication: Nabil 10:45AM 1/23

## 2022-01-23 NOTE — PROGRESS NOTE ADULT - SUBJECTIVE AND OBJECTIVE BOX
[facility] DIVISION OF KIDNEY DISEASES AND HYPERTENSION -- FOLLOW UP NOTE  --------------------------------------------------------------------------------  Chief Complaint: KOLBY/Hyperkalemia     24 hour events/subjective: Pt. seen and examined today AM, reports feeling better. Pt remained persistently hyperkalemic despite medical management and was initiated on HD on 01/20/22. Pt. underwent last HD on 01/22/22, however was aborted early in ~60 minutes due to tachycardia. Scr elevated at 8.59 and serum K at 4.9 today.      PAST HISTORY  --------------------------------------------------------------------------------  No significant changes to PMH, PSH, FHx, SHx, unless otherwise noted    ALLERGIES & MEDICATIONS  --------------------------------------------------------------------------------  Allergies    No Known Allergies    Intolerances    Standing Inpatient Medications  chlorhexidine 2% Cloths 1 Application(s) Topical daily  influenza  Vaccine (HIGH DOSE) 0.7 milliLiter(s) IntraMuscular once  lactulose Syrup 10 Gram(s) Oral two times a day    PRN Inpatient Medications  acetaminophen     Tablet .. 650 milliGRAM(s) Oral every 6 hours PRN    REVIEW OF SYSTEMS  --------------------------------------------------------------------------------  Gen: No fevers/chills  Skin: No rashes  Head/Eyes/Ears: Normal hearing,   Respiratory: No dyspnea, cough  CV: No chest pain  GI: No abdominal pain, diarrhea  : hematuria+, as per HPI  MSK: No  edema    All other systems were reviewed and are negative, except as noted.    VITALS/PHYSICAL EXAM  --------------------------------------------------------------------------------  T(C): 36.8 (01-23-22 @ 05:30), Max: 37.8 (01-22-22 @ 17:26)  HR: 82 (01-23-22 @ 05:30) (82 - 167)  BP: 125/71 (01-23-22 @ 05:30) (100/74 - 128/66)  RR: 18 (01-23-22 @ 05:30) (18 - 18)  SpO2: 100% (01-23-22 @ 05:30) (98% - 100%)  Wt(kg): --    01-22-22 @ 07:01  -  01-23-22 @ 07:00  --------------------------------------------------------  IN: 580 mL / OUT: 1210 mL / NET: -630 mL    Physical Exam:  	Gen: ill-appearing elderly male  	HEENT: Anicteric  	Pulm: CTA B/L  	CV: S1S2  	Abd: Soft, BS+  	Ext: No LE edema B/L  	Neuro: Awake              : Leija with with shannon color urine+  	Skin: Warm and dry  	Vascular access: Right IJ nontunelled HD cath+      LABS/STUDIES  --------------------------------------------------------------------------------              7.2    10.11 >-----------<  235      [01-23-22 @ 05:01]              22.8     130  |  95  |  52  ----------------------------<  103      [01-23-22 @ 05:01]  4.9   |  25  |  8.59        Ca     7.4     [01-23-22 @ 05:01]      Mg     2.20     [01-23-22 @ 05:01]      Phos  4.7     [01-23-22 @ 05:01]    Creatinine Trend:  SCr 8.59 [01-23 @ 05:01]  SCr 8.04 [01-22 @ 18:34]  SCr 8.09 [01-22 @ 06:43]  SCr 8.09 [01-22 @ 04:00]  SCr 5.97 [01-21 @ 23:54]    Urinalysis - [01-20-22 @ 02:48]      Color Dark Brown / Appearance Turbid / SG 1.039 / pH 8.5      Gluc Trace / Ketone Negative  / Bili Negative / Urobili <2 mg/dL       Blood Moderate / Protein >600 mg/dL / Leuk Est Negative / Nitrite Negative      RBC >50 / WBC >50 / Hyaline 4-8 / Gran  / Sq Epi  / Non Sq Epi  / Bacteria Moderate    Urine Creatinine <4      [01-20-22 @ 07:25]  Urine Sodium 154      [01-20-22 @ 07:25]  Urine Osmolality 275      [01-20-22 @ 07:25]    HBsAb <3.0      [01-21-22 @ 09:28]  HBsAb Nonreact      [01-21-22 @ 09:29]  HBsAg Nonreact      [01-21-22 @ 09:29]  HBcAb Nonreact      [01-21-22 @ 09:29]  HCV 0.13, Nonreact      [01-21-22 @ 09:28]     [facility] DIVISION OF KIDNEY DISEASES AND HYPERTENSION -- FOLLOW UP NOTE  --------------------------------------------------------------------------------  Chief Complaint: KOLBY/Hyperkalemia     24 hour events/subjective: Pt. with KOLBY in setting of obstructive uropathy, initiated on HD on 1/20/22 for persistent hyperkalemia. Pt. underwent last HD on 01/22/22, however treatment was terminated in ~60 minutes due to tachycardia. Scr elevated at 8.59 and serum potassium WNL at 4.9 today.      Pt. seen and examined today AM, reports feeling better. No complaints offered during rounds.    PAST HISTORY  --------------------------------------------------------------------------------  No significant changes to PMH, PSH, FHx, SHx, unless otherwise noted    ALLERGIES & MEDICATIONS  --------------------------------------------------------------------------------  Allergies    No Known Allergies    Intolerances    Standing Inpatient Medications  chlorhexidine 2% Cloths 1 Application(s) Topical daily  influenza  Vaccine (HIGH DOSE) 0.7 milliLiter(s) IntraMuscular once  lactulose Syrup 10 Gram(s) Oral two times a day    PRN Inpatient Medications  acetaminophen     Tablet .. 650 milliGRAM(s) Oral every 6 hours PRN    REVIEW OF SYSTEMS  --------------------------------------------------------------------------------  Gen: No fevers/chills  Skin: No rashes  Head/Eyes/Ears: Normal hearing,   Respiratory: No dyspnea, cough  CV: No chest pain  GI: No abdominal pain, diarrhea  : hematuria+, as per HPI  MSK: No  edema    All other systems were reviewed and are negative, except as noted.    VITALS/PHYSICAL EXAM  --------------------------------------------------------------------------------  T(C): 36.8 (01-23-22 @ 05:30), Max: 37.8 (01-22-22 @ 17:26)  HR: 82 (01-23-22 @ 05:30) (82 - 167)  BP: 125/71 (01-23-22 @ 05:30) (100/74 - 128/66)  RR: 18 (01-23-22 @ 05:30) (18 - 18)  SpO2: 100% (01-23-22 @ 05:30) (98% - 100%)  Wt(kg): --    01-22-22 @ 07:01  -  01-23-22 @ 07:00  --------------------------------------------------------  IN: 580 mL / OUT: 1210 mL / NET: -630 mL    Physical Exam:  	Gen: resting, NAD  	HEENT: Anicteric  	Pulm: CTA B/L  	CV: S1S2+  	Abd: Soft, BS+  	Ext: No LE edema B/L  	Neuro: Awake, alert              : Leija with shannon color urine+  	Skin: Warm and dry  	Vascular access: Right IJ non-tunneled HD cath site: no bleeding    LABS/STUDIES  --------------------------------------------------------------------------------              7.2    10.11 >-----------<  235      [01-23-22 @ 05:01]              22.8     130  |  95  |  52  ----------------------------<  103      [01-23-22 @ 05:01]  4.9   |  25  |  8.59        Ca     7.4     [01-23-22 @ 05:01]      Mg     2.20     [01-23-22 @ 05:01]      Phos  4.7     [01-23-22 @ 05:01]    Creatinine Trend:  SCr 8.59 [01-23 @ 05:01]  SCr 8.04 [01-22 @ 18:34]  SCr 8.09 [01-22 @ 06:43]  SCr 8.09 [01-22 @ 04:00]  SCr 5.97 [01-21 @ 23:54]    Urinalysis - [01-20-22 @ 02:48]      Color Dark Brown / Appearance Turbid / SG 1.039 / pH 8.5      Gluc Trace / Ketone Negative  / Bili Negative / Urobili <2 mg/dL       Blood Moderate / Protein >600 mg/dL / Leuk Est Negative / Nitrite Negative      RBC >50 / WBC >50 / Hyaline 4-8 / Gran  / Sq Epi  / Non Sq Epi  / Bacteria Moderate    Urine Creatinine <4      [01-20-22 @ 07:25]  Urine Sodium 154      [01-20-22 @ 07:25]  Urine Osmolality 275      [01-20-22 @ 07:25]    HBsAb <3.0      [01-21-22 @ 09:28]  HBsAb Nonreact      [01-21-22 @ 09:29]  HBsAg Nonreact      [01-21-22 @ 09:29]  HBcAb Nonreact      [01-21-22 @ 09:29]  HCV 0.13, Nonreact      [01-21-22 @ 09:28]

## 2022-01-23 NOTE — PROGRESS NOTE ADULT - PROBLEM SELECTOR PLAN 1
-K 7.2 on admission now 5.3 after HD, no EKG changes   - HD per right IJ Rachna; repeat HD session today   - 2/2 ARF likely 2/2 chronic obstruction 2/2 clot burden given B/L hydronephrosis -K 7.2 on admission now 4.9 after 2 abbreviated HD sessions 1/22, no EKG changes   - HD per right IJ Shiley  - 2/2 ARF likely 2/2 chronic obstruction 2/2 clot burden given B/L hydronephrosis

## 2022-01-23 NOTE — PROGRESS NOTE ADULT - PROBLEM SELECTOR PLAN 1
Pt. with KOLBY in the setting of obstructive uropathy/B/L hydronephrosis. No prior history of kidney failure. Scr WNL in 2/2019. Scr on admission (01/20) elevated at 9.87. Pt started on HD on 01/20 for persistent hyperkalemia. Pt. underwent last HD on 01/22/22, however was terminated after ~60 minutes due to tachycardia. Scr elevated at 8.59 and serum K at 4.9 (WNL). Pt. clinically stable. Labs reviewed. No plan for HD today. Urology team following for hematuria and hydronephrosis. Monitor labs and urine output. Avoid nephrotoxins. Dose medications as per eGFR. Pt. with KOLBY in the setting of obstructive uropathy/B/L hydronephrosis. No prior history of kidney failure. Scr WNL in 2/2019. Scr on admission (01/20) elevated at 9.87. Pt started on HD on 01/20 for persistent hyperkalemia. Pt. underwent last HD on 01/22/22, however was terminated after ~60 minutes due to tachycardia. Scr elevated at 8.59 and serum K at 4.9 (WNL). Pt. clinically stable. Labs reviewed. No plan for HD today. Will assess need for HD daily. Urology team following for hematuria and hydronephrosis. Monitor labs and urine output. Avoid nephrotoxins. Dose medications as per eGFR.

## 2022-01-24 NOTE — PROGRESS NOTE ADULT - PROBLEM SELECTOR PLAN 5
-Hgb 6.5 on admission s/p 2U now 7.2  -likely 2/2 acute blood loss from hematuria for 2 weeks, will transfuse to Hgb>7 -Na 122 on admission, now 130 s/p HD, appropriately corrected

## 2022-01-24 NOTE — PROGRESS NOTE ADULT - PROBLEM SELECTOR PLAN 2
- Renal failure in setting of obstructive uropathy + b/l hydronephrosis + anemia. no previous history of renal failure  - creatinine 9.87 on admission. HD initiated on 1/20, last session 1/22 but was terminated early for sinus tach   - nephro to assess need for HD daily   - pt. found to have large clot in bladder causing distention. Urology following, CBI stopped 1/22, clearing with manual irrigation - Renal failure in setting of obstructive uropathy + b/l hydronephrosis + anemia. no previous history of renal failure  - creatinine 9.87 on admission. HD via R IJ shiley initiated on 1/20, last session 1/22 but was terminated early for sinus tach   - pt. found to have large clot in bladder causing distention. Urology following, CBI stopped 1/22, clearing with manual irrigation  - nephro to assess need for HD daily   - likely HD today - Renal failure in setting of obstructive uropathy + b/l hydronephrosis + anemia. no previous history of renal failure  - creatinine 9.87 on admission. HD via R IJ shiley initiated on 1/20, last session 1/22 but was terminated early for sinus tach   - pt. found to have large clot in bladder causing distention. Urology following, CBI stopped 1/22, clearing with manual irrigation. Uro planning for cystoscopy, clot evacuation, fulguration, and b/l ureteral stent insertion 1/26   - nephro to assess need for HD daily   - likely HD today

## 2022-01-24 NOTE — PROGRESS NOTE ADULT - PROBLEM SELECTOR PLAN 1
Pt. with KOLBY in the setting of obstructive uropathy/B/L hydronephrosis. No prior history of kidney failure. Scr WNL in 2/2019. Scr on admission (01/20) elevated at 9.87. Pt started on HD on 01/20 for persistent hyperkalemia. Pt. underwent last HD on 01/22/22, however was terminated after ~60 minutes due to tachycardia. Today K is 5.4 (non-hemolyzed). Pt. clinically stable. Labs reviewed. Plan for HD today. May need HD catheter repositioning, as everytime we attempt HD, HR increases up to 160s and had to be terminated early. Will assess need for HD daily. Urology team following for hematuria and hydronephrosis. Monitor labs and urine output. Avoid nephrotoxins. Dose medications as per eGFR. Pt. with KOLBY in the setting of obstructive uropathy/B/L hydronephrosis. No prior history of kidney failure. Scr WNL in 2/2019. Scr on admission (01/20) elevated at 9.87. Pt started on HD on 01/20 for persistent hyperkalemia. Pt. underwent last HD on 01/22/22, however was terminated after ~60 minutes due to tachycardia. Today K is 5.4 (non-hemolyzed). Pt. clinically stable. Labs reviewed. Plan for HD today. May need HD catheter repositioning, as everytime we attempt HD, HR increases up to 160s and had to be terminated early. Will assess need for HD daily. Urology team following for hematuria and hydronephrosis. Monitor labs and urine output. Avoid nephrotoxins. Dose medications as per eGFR.    Urology planned for cystoscopy with possible b/l ureteral stents, no opposition from nephrology standpoint.

## 2022-01-24 NOTE — PROGRESS NOTE ADULT - SUBJECTIVE AND OBJECTIVE BOX
[facility] DIVISION OF KIDNEY DISEASES AND HYPERTENSION -- FOLLOW UP NOTE  --------------------------------------------------------------------------------    24 hour events/subjective: Pt. with KOLBY in setting of obstructive uropathy, initiated on HD on 1/20/22 for persistent hyperkalemia. Pt. underwent last HD on 01/22/22, however treatment was terminated in ~60 minutes due to tachycardia. K today is 5.4 this morning.     Pt. seen and examined today AM, reports feeling ok.     PAST HISTORY  --------------------------------------------------------------------------------  No significant changes to PMH, PSH, FHx, SHx, unless otherwise noted    ALLERGIES & MEDICATIONS  --------------------------------------------------------------------------------  Allergies    No Known Allergies    Intolerances    Standing Inpatient Medications  chlorhexidine 2% Cloths 1 Application(s) Topical daily  influenza  Vaccine (HIGH DOSE) 0.7 milliLiter(s) IntraMuscular once  lactulose Syrup 10 Gram(s) Oral two times a day    PRN Inpatient Medications  acetaminophen     Tablet .. 650 milliGRAM(s) Oral every 6 hours PRN    REVIEW OF SYSTEMS  --------------------------------------------------------------------------------  Gen: No fevers/chills   Respiratory: No dyspnea, cough  CV: No chest pain  GI: No abdominal pain, diarrhea  MSK: No  edema  Heme: No easy bruising or bleeding  Psych: No significant depression    All other systems were reviewed and are negative, except as noted.    VITALS/PHYSICAL EXAM  --------------------------------------------------------------------------------  T(C): 36.8 (01-24-22 @ 13:11), Max: 37.3 (01-23-22 @ 17:22)  HR: 79 (01-24-22 @ 13:11) (79 - 97)  BP: 130/64 (01-24-22 @ 13:11) (117/68 - 135/56)  RR: 18 (01-24-22 @ 13:11) (18 - 19)  SpO2: 100% (01-24-22 @ 13:11) (98% - 100%)  Wt(kg): --    01-23-22 @ 07:01  -  01-24-22 @ 07:00  --------------------------------------------------------  IN: 0 mL / OUT: 500 mL / NET: -500 mL    01-24-22 @ 07:01  -  01-24-22 @ 13:57  --------------------------------------------------------  IN: 0 mL / OUT: 200 mL / NET: -200 mL    Physical Exam:  	Gen: NAD  	HEENT: MMM  	Pulm: CTA B/L, no crackles   	CV: S1S2  	Abd: Soft, +BS   	Ext: trace LE edema B/L  	Neuro: Awake  	Skin: Warm and dry  	Vascular access: RIJ non-tunneled HD catheter     LABS/STUDIES  --------------------------------------------------------------------------------              7.3    11.12 >-----------<  247      [01-24-22 @ 06:47]              22.8     128  |  94  |  65  ----------------------------<  86      [01-24-22 @ 06:47]  5.4   |  22  |  10.79        Ca     7.5     [01-24-22 @ 06:47]      Mg     2.30     [01-24-22 @ 06:47]      Phos  5.7     [01-24-22 @ 06:47]    TPro  5.1  /  Alb  2.2  /  TBili  0.4  /  DBili  x   /  AST  14  /  ALT  12  /  AlkPhos  61  [01-24-22 @ 06:47]    Creatinine Trend:  SCr 10.79 [01-24 @ 06:47]  SCr 8.59 [01-23 @ 05:01]  SCr 8.04 [01-22 @ 18:34]  SCr 8.09 [01-22 @ 06:43]  SCr 8.09 [01-22 @ 04:00]    Urinalysis - [01-20-22 @ 02:48]      Color Dark Brown / Appearance Turbid / SG 1.039 / pH 8.5      Gluc Trace / Ketone Negative  / Bili Negative / Urobili <2 mg/dL       Blood Moderate / Protein >600 mg/dL / Leuk Est Negative / Nitrite Negative      RBC >50 / WBC >50 / Hyaline 4-8 / Gran  / Sq Epi  / Non Sq Epi  / Bacteria Moderate    Urine Creatinine <4      [01-20-22 @ 07:25]  Urine Sodium 154      [01-20-22 @ 07:25]  Urine Osmolality 275      [01-20-22 @ 07:25]    Iron 126, TIBC 190, %sat 66      [01-20-22 @ 18:42]  Ferritin 170      [01-21-22 @ 03:14]  HbA1c 5.7      [12-30-18 @ 14:04]    HBsAb <3.0      [01-21-22 @ 09:28]  HBsAb Nonreact      [01-21-22 @ 09:29]  HBsAg Nonreact      [01-21-22 @ 09:29]  HBcAb Nonreact      [01-21-22 @ 09:29]  HCV 0.13, Nonreact      [01-21-22 @ 09:28]

## 2022-01-24 NOTE — CONSULT NOTE ADULT - SUBJECTIVE AND OBJECTIVE BOX
Interventional Radiology    Evaluate for Procedure: nontunneled HD catheter exchange vs new placement    HPI: 89y Male s/p nontunneled HD catheter one week ago now found to be sinus tachycardia during HD sessions. Catheter may be a bit short based on prior CXR. IR consulted for exchange vs new placement.    Allergies:   Medications (Abx/Cardiac/Anticoagulation/Blood Products)      Data:    T(C): 36.8  HR: 79  BP: 130/64  RR: 18  SpO2: 100%    -WBC 11.12 / HgB 7.3 / Hct 22.8 / Plt 247  -Na 128 / Cl 94 / BUN 65 / Glucose 86  -K 5.4 / CO2 22 / Cr 10.79  -ALT 12 / Alk Phos 61 / T.Bili 0.4  -INR 1.10 / PTT 32.9      Radiology: CXR reviewed    Assessment/Plan: 89 yo M with sinus tachy during HD sessions.     -- IR will plan to perform nontunneled catheter exchange vs new placement Tuesday 1/25/22  -- please place IR procedure request order and preprocedure note under Dr. Donna Cowart

## 2022-01-24 NOTE — PROGRESS NOTE ADULT - PROBLEM SELECTOR PLAN 1
-K 7.2 on admission now 4.9 after 2 abbreviated HD sessions 1/22, no EKG changes   - HD per right IJ Shiley  - 2/2 ARF likely 2/2 chronic obstruction 2/2 clot burden given B/L hydronephrosis -K 7.2 on admission s/p 2 abbreviated HD sessions 1/22, no EKG changes   - 2/2 ARF likely 2/2 chronic obstruction 2/2 clot burden given B/L hydronephrosis  - cont to trend: 4.9 > 5.4

## 2022-01-24 NOTE — CHART NOTE - NSCHARTNOTEFT_GEN_A_CORE
PRE-INTERVENTIONAL RADIOLOGY PROCEDURE NOTE      Patient Age: 89y    Patient Gender: Male    Procedure: non-tunneled catheter exchange vs new placement     Diagnosis/Indication: ARF 2/2 obstructive uropathy     Interventional Radiology Attending Physician: Dr. Donna Cowart     Ordering Attending Physician: Dr. Richard Leonardo     Pertinent Medical History: HTN, prostate ca s/p XRT, bladder ca s/p tumor resection     Pertinent labs:                      7.3    11.12 )-----------( 247      ( 24 Jan 2022 06:47 )             22.8       01-24    128<L>  |  94<L>  |  65<H>  ----------------------------<  86  5.4<H>   |  22  |  10.79<H>    Ca    7.5<L>      24 Jan 2022 06:47  Phos  5.7     01-24  Mg     2.30     01-24    TPro  5.1<L>  /  Alb  2.2<L>  /  TBili  0.4  /  DBili  x   /  AST  14  /  ALT  12  /  AlkPhos  61  01-24              Patient and Family Aware ? Yes

## 2022-01-24 NOTE — CHART NOTE - NSCHARTNOTEFT_GEN_A_CORE
Tentatively planned for OR on Wednesday 1/26/22 for cystoscopy, clot evacuation, fulguration, bilateral ureteral stent insertion given ongoing gross hematuria, persistent KOLBY, and hydronephrosis.    Discussed with primary team, Dr. Fitzpatrick.    Please document medical and nephrology optimization/clearance.

## 2022-01-24 NOTE — PROGRESS NOTE ADULT - ASSESSMENT
89 year old male with a medical history significant for HTN, prostate ca, s/p XRT, and recent cystoscopy 10/28/21 for a bladder tumor, presenting to the ED with hematuria, malaise, lethargy and altered mental status x 2 weeks, in renal failure with bilateral hydro, obstructive uropathy, gross hematuria, catheter placement and manual irrigation with removal of ~200cc of clot, and started on CBI, anemic, receiving transfusion.     -Gross hematuria improved, now off CBI since 1/22/22 --> no need for CBI at this time as color clears easily with minimal manual irrigation. Patient is not making much urine to dilute any bleeding so recommend manual irrigation at least q shift and prn for dark/opaque color or clots   -Document UOP in flowsheets at least q4h  -Monitor urine color, manually hand irrigate mendez PRN (see above) to clear urine --  use pierre (piston syringe) and inject 60 cc of sterile water into the mendez catheter through the drainage tube (do not irrigate via the side port) and draw back for irrigation, repeat until urine clears  -Continue mendez; would not recommend TOV on this admission, patient can follow up as outpatient for TOV and evaluation or urinary retention

## 2022-01-24 NOTE — PHYSICAL THERAPY INITIAL EVALUATION ADULT - ADDITIONAL COMMENTS
Pt. reports ambulating with a straight cane. Pt. has KAFO for left LE.     Pt. was left in bed post PT Evaluation, no apparent distress, all lines intact. RN made aware.

## 2022-01-24 NOTE — PROGRESS NOTE ADULT - PROBLEM SELECTOR PLAN 2
Pt. with hyperkalemia in the setting of renal failure and obstructive uropathy. Serum potassium elevated at 7.2 on admission. Pt. initiated on HD on 1/20 for persistent hyperkalemia. Last HD done 01/22/22. Serum potassium elevated today (5.4). Will be for HD today. Low potassium diet. Monitor serum potassium.    If any questions, please feel free to contact me     Michele Blackwell  Nephrology Fellow  [facility] Pager: [phone]  [facility] Pager: [phone]

## 2022-01-24 NOTE — PROGRESS NOTE ADULT - PROBLEM SELECTOR PLAN 3
- Prostate cancer s/p XRT, and previous cystoscopy with bladder tumor removal on 10/28/2021 at [facility]  -hematuria for 2 weeks, s/p cystoscopy + tumor removal in October  -Leija placed by urology, SHAII clamped 1/22, no TOV this admission Urology planning for cystoscopy, clot evacuation, fulguration, and b/l ureteral stent insertion 1/26   - METS approx 3-4  - RCRI score 1 with 6% 30 day risk of death, MI, or cardiac arrest for low risk procedure  - currently medically optimized for above procedures

## 2022-01-24 NOTE — PROGRESS NOTE ADULT - SUBJECTIVE AND OBJECTIVE BOX
Progress Note    01-20-22 (4d)  -----------------------------------------------------------------------------------------------------------  GARETH Reynaga PGY1  Pager# [phone]  After 7pm, please page night float pager #[phone]  ------------------------------------------------------------------------------------------------------------    Patient is a 89y old  Male who presents with a chief complaint of Hematuria, AMS (24 Jan 2022 09:02)      Subjective / Overnight Events :  - No acute events overnight  - Mendez with dark red blood, night RN was unable to irrigate mendez overnight. Mendez cleared easily with irrigation by urology   - pt without any acute complaints     Additional ROS (if any):    MEDICATIONS  (STANDING):  chlorhexidine 2% Cloths 1 Application(s) Topical daily  influenza  Vaccine (HIGH DOSE) 0.7 milliLiter(s) IntraMuscular once  lactulose Syrup 10 Gram(s) Oral two times a day    MEDICATIONS  (PRN):  acetaminophen     Tablet .. 650 milliGRAM(s) Oral every 6 hours PRN Temp greater or equal to 38C (100.4F), Mild Pain (1 - 3), Moderate Pain (4 - 6)      CAPILLARY BLOOD GLUCOSE        I&O's Summary    23 Jan 2022 07:01  -  24 Jan 2022 07:00  --------------------------------------------------------  IN: 0 mL / OUT: 500 mL / NET: -500 mL    24 Jan 2022 07:01  -  24 Jan 2022 09:35  --------------------------------------------------------  IN: 0 mL / OUT: 200 mL / NET: -200 mL        PHYSICAL EXAM:  Vital Signs Last 24 Hrs  T(C): 36.8 (24 Jan 2022 09:11), Max: 37.3 (23 Jan 2022 17:22)  T(F): 98.2 (24 Jan 2022 09:11), Max: 99.2 (23 Jan 2022 17:22)  HR: 82 (24 Jan 2022 09:11) (82 - 97)  BP: 126/72 (24 Jan 2022 09:11) (117/68 - 149/81)  BP(mean): --  RR: 18 (24 Jan 2022 09:11) (18 - 19)  SpO2: 98% (24 Jan 2022 09:11) (98% - 100%)    General: NAD, non-toxic appearing   HEENT:  EOMi, no scleral icterus  CV: RRR, normal S1 and S2, no m/r/g  Lungs: normal respiratory effort. CTAB, no wheezes, rales, or rhonchi  Abd: soft, nontender, nondistended  Ext: no edema, 2+ peripheral pulses   Pysch: AAOx3, appropriate affect   Neuro: grossly non-focal, moving all extremities spontaneously   Skin: no rashes or lesions     LABS:                          7.3    11.12 )-----------( 247      ( 24 Jan 2022 06:47 )             22.8       WBC Trend: 11.12<--, 10.11<--, 11.68<--  Hb Trend: 7.3<--, 7.2<--, 7.3<--, 8.4<--, 7.8<--    01-24    128<L>  |  94<L>  |  65<H>  ----------------------------<  86  5.4<H>   |  22  |  10.79<H>    Ca    7.5<L>      24 Jan 2022 06:47  Phos  5.7     01-24  Mg     2.30     01-24    TPro  5.1<L>  /  Alb  2.2<L>  /  TBili  0.4  /  DBili  x   /  AST  14  /  ALT  12  /  AlkPhos  61  01-24              Culture - Blood (collected 22 Jan 2022 08:13)  Source: .Blood Blood-Venous  Preliminary Report (23 Jan 2022 09:01):    No growth to date.    Culture - Blood (collected 22 Jan 2022 08:13)  Source: .Blood Blood-Peripheral  Preliminary Report (23 Jan 2022 09:01):    No growth to date.        RADIOLOGY & ADDITIONAL TESTS: Reviewed Progress Note    01-20-22 (4d)  -----------------------------------------------------------------------------------------------------------  GARETH Reynaga PGY1  Pager# [phone]  After 7pm, please page night float pager #[phone]  ------------------------------------------------------------------------------------------------------------    Patient is a 89y old  Male who presents with a chief complaint of Hematuria, AMS (24 Jan 2022 09:02)      Subjective / Overnight Events :  - No acute events overnight  - Mendez with dark red blood, night RN was unable to irrigate mendez overnight. Mendez cleared easily with irrigation by urology   - pt without any acute complaints   - SCr 10.79 today, likely HD today     Additional ROS (if any):    MEDICATIONS  (STANDING):  chlorhexidine 2% Cloths 1 Application(s) Topical daily  influenza  Vaccine (HIGH DOSE) 0.7 milliLiter(s) IntraMuscular once  lactulose Syrup 10 Gram(s) Oral two times a day    MEDICATIONS  (PRN):  acetaminophen     Tablet .. 650 milliGRAM(s) Oral every 6 hours PRN Temp greater or equal to 38C (100.4F), Mild Pain (1 - 3), Moderate Pain (4 - 6)      CAPILLARY BLOOD GLUCOSE        I&O's Summary    23 Jan 2022 07:01  -  24 Jan 2022 07:00  --------------------------------------------------------  IN: 0 mL / OUT: 500 mL / NET: -500 mL    24 Jan 2022 07:01  -  24 Jan 2022 09:35  --------------------------------------------------------  IN: 0 mL / OUT: 200 mL / NET: -200 mL        PHYSICAL EXAM:  Vital Signs Last 24 Hrs  T(C): 36.8 (24 Jan 2022 09:11), Max: 37.3 (23 Jan 2022 17:22)  T(F): 98.2 (24 Jan 2022 09:11), Max: 99.2 (23 Jan 2022 17:22)  HR: 82 (24 Jan 2022 09:11) (82 - 97)  BP: 126/72 (24 Jan 2022 09:11) (117/68 - 149/81)  BP(mean): --  RR: 18 (24 Jan 2022 09:11) (18 - 19)  SpO2: 98% (24 Jan 2022 09:11) (98% - 100%)    General: NAD, non-toxic appearing   HEENT:  EOMi, no scleral icterus  CV: RRR, normal S1 and S2, no m/r/g  Lungs: normal respiratory effort. CTAB, no wheezes, rales, or rhonchi  Abd: soft, nontender, nondistended  Ext: no edema, 2+ peripheral pulses   Pysch: AAOx3, appropriate affect   Neuro: grossly non-focal, moving all extremities spontaneously   Skin: no rashes or lesions     LABS:                          7.3    11.12 )-----------( 247      ( 24 Jan 2022 06:47 )             22.8       WBC Trend: 11.12<--, 10.11<--, 11.68<--  Hb Trend: 7.3<--, 7.2<--, 7.3<--, 8.4<--, 7.8<--    01-24    128<L>  |  94<L>  |  65<H>  ----------------------------<  86  5.4<H>   |  22  |  10.79<H>    Ca    7.5<L>      24 Jan 2022 06:47  Phos  5.7     01-24  Mg     2.30     01-24    TPro  5.1<L>  /  Alb  2.2<L>  /  TBili  0.4  /  DBili  x   /  AST  14  /  ALT  12  /  AlkPhos  61  01-24              Culture - Blood (collected 22 Jan 2022 08:13)  Source: .Blood Blood-Venous  Preliminary Report (23 Jan 2022 09:01):    No growth to date.    Culture - Blood (collected 22 Jan 2022 08:13)  Source: .Blood Blood-Peripheral  Preliminary Report (23 Jan 2022 09:01):    No growth to date.        RADIOLOGY & ADDITIONAL TESTS: Reviewed

## 2022-01-24 NOTE — PROGRESS NOTE ADULT - PROBLEM SELECTOR PLAN 4
-Na 122 on admission, now 130 s/p HD, appropriately corrected - Prostate cancer s/p XRT, and previous cystoscopy with bladder tumor removal on 10/28/2021 at [facility]  -hematuria for 2 weeks, s/p cystoscopy + tumor removal in October  -Leija placed by urology, SHAII clamped 1/22, no TOV this admission

## 2022-01-24 NOTE — PROGRESS NOTE ADULT - PROBLEM SELECTOR PLAN 7
Diet: Regular  DVT: Holding i/s/o bleeding   Dispo: Pending PT eval  Code: DNR/DNI  Communication: Nabil 10:45AM 1/23 Diet: Regular  DVT: Holding i/s/o bleeding   Dispo: Pending PT eval  Code: DNR/DNI  Communication: Zuleyka Ferrer 1/24 4:20PM

## 2022-01-24 NOTE — PROGRESS NOTE ADULT - SUBJECTIVE AND OBJECTIVE BOX
Subjective  Patient offers no complaints. No abdominal pain or mendez discomfort.  Catheter draining opaque, no clots upon arrival. Irrigated and cleared easily with 1 60cc syringe.  Irrigation instructions clarified with primary team (Dr. Fitzpatrick)    Objective    Vital signs  T(F): , Max: 99.2 (01-23-22 @ 17:22)  HR: 84 (01-24-22 @ 05:22)  BP: 135/56 (01-24-22 @ 05:22)  SpO2: 100% (01-24-22 @ 05:22)  Wt(kg): --    Output     OUT:    Indwelling Catheter - Urethral (mL): 500 mL  Total OUT: 500 mL    Total NET: -500 mL      OUT:    Indwelling Catheter - Urethral (mL): 200 mL  Total OUT: 200 mL    Total NET: -200 mL          Gen: NAD  Abd: soft, nontender, nondistended  : mendez secured in place, draining opaque dark red, though no clots and cleared easily with 1 60cc syringe (irrigated with additional sterile water, no clots appreciated, color remained clear.    Labs      01-24 @ 06:47    WBC 11.12 / Hct 22.8  / SCr 10.79    01-23 @ 05:01    WBC 10.11 / Hct 22.8  / SCr 8.59         Culture - Blood (collected 01-22-22 @ 08:13)  Source: .Blood Blood-Venous  Preliminary Report (01-23-22 @ 09:01):    No growth to date.    Culture - Blood (collected 01-22-22 @ 08:13)  Source: .Blood Blood-Peripheral  Preliminary Report (01-23-22 @ 09:01):    No growth to date.    Culture - Urine (collected 01-20-22 @ 09:03)  Source: Clean Catch Clean Catch (Midstream)  Final Report (01-21-22 @ 11:00):    No growth

## 2022-01-24 NOTE — PROGRESS NOTE ADULT - ASSESSMENT
89M Hx HTN, prostate cancer s/p XRT, and previous cystoscopy with bladder tumor removal on 10/28/2021 at [facility] presents with 2 week history of hematuria, malaise, and lethargy found to be in acute renal failure 2/2 heavy clot burden with persistent hyponatremia and hyperkalemia requiring urgent HD now transferred back to floors and may be unable to tolerate HD- assessing on a daily basis. Now off CBI since 1/22.

## 2022-01-24 NOTE — PROGRESS NOTE ADULT - PROBLEM SELECTOR PLAN 6
Diet: Regular  DVT: Holding i/s/o bleeding   Dispo: Pending PT eval  Code: DNR/DNI  Communication: Nabil 10:45AM 1/23 -Hgb 6.5 on admission s/p 2U now 7.2  -likely 2/2 acute blood loss from hematuria for 2 weeks, will transfuse to Hgb>7

## 2022-01-25 NOTE — CONSULT NOTE ADULT - ATTENDING COMMENTS
89 year old M with PMH of HTN, prostate cancer s/p XRT, and previous cystoscopy with bladder tumor removal on 10/28/2021 at [facility] presented with 2 week history of hematuria, malaise, and lethargy. He was found to be in acute renal failure 2/2 heavy clot burden with persistent hyponatremia and hyperkalemia requiring initiation of urgent dialysis via R IJ shiley. EP was called to evaluate as patient had two episodes SVT with HR up to 160s associated with hypotension. He received one dose of Amiodarone 150 mg IV. Currently not on any AV andrew blockers and is in sinus rhythm with HR 60s-70s. Cont metoprolol. Will follow on tele.
Retention relieved with bladder lavage. Now stable on CBI.  continue CBI, will follow
Hyperkalemia  Acute blood loss anemia  Obstructive uropathy    Will need to repeat and monitor labs. He needs to continue CBI.  If K does not improve, will need dialysis today.

## 2022-01-25 NOTE — CHART NOTE - NSCHARTNOTEFT_GEN_A_CORE
Patient has gross hematuria, off CBI. He became hypotensive in hemodialysis. Upon evaluation, patient had some dark bloody urine in the bag that looked old. He was irrigated with minimal clots, color cleared up quickly  with about 300cc irrigation. No need for CBI.  Irrigate PRN

## 2022-01-25 NOTE — PROGRESS NOTE ADULT - PROBLEM SELECTOR PLAN 2
- Renal failure in setting of obstructive uropathy + b/l hydronephrosis + anemia. no previous history of renal failure  - creatinine 9.87 on admission. HD via R IJ shiley initiated on 1/20, last session 1/22 but was terminated early for sinus tach   - pt. found to have large clot in bladder causing distention. Urology following, CBI stopped 1/22, clearing with manual irrigation. Uro planning for cystoscopy, clot evacuation, fulguration, and b/l ureteral stent insertion 1/26   - nephro to assess need for HD daily   - was unable to tolerate HD again 1/24- became hypotensive (SBP 70s) and tachy () - Renal failure in setting of obstructive uropathy + b/l hydronephrosis + anemia. no previous history of renal failure  - creatinine 9.87 on admission. HD via R IJ shiley initiated on 1/20, last session 1/22 but was terminated early for sinus tach   - pt. found to have large clot in bladder causing distention. Urology following, CBI stopped 1/22  - was unable to tolerate HD again 1/24- became hypotensive (SBP 70s) and tachy ()    - IR does not believe shiley is related to tachycardia as catheter tip not in heart   - urology originally planning for OR today for possible ureteral stent placement to relieve pt's obstruction, anesthesia now more risky, recommending IR for PCN under local anesthesia   - f/u nephro re: future HD plans going forward   - IR consulted

## 2022-01-25 NOTE — PROGRESS NOTE ADULT - SUBJECTIVE AND OBJECTIVE BOX
[facility] DIVISION OF KIDNEY DISEASES AND HYPERTENSION -- FOLLOW UP NOTE  --------------------------------------------------------------------------------  HPI: Pt. with KOLBY in setting of obstructive uropathy, initiated on HD on 1/20/22 for persistent hyperkalemia. Pt. underwent last HD on 01/24/22, however treatment was terminated in ~120 minutes due to tachycardia. K today is 5.0 this morning.     Pt. seen and examined today AM, reports feeling ok.     PAST HISTORY  --------------------------------------------------------------------------------  No significant changes to PMH, PSH, FHx, SHx, unless otherwise noted    ALLERGIES & MEDICATIONS  --------------------------------------------------------------------------------  Allergies    No Known Allergies    Intolerances      Standing Inpatient Medications  chlorhexidine 2% Cloths 1 Application(s) Topical daily  influenza  Vaccine (HIGH DOSE) 0.7 milliLiter(s) IntraMuscular once  lactulose Syrup 10 Gram(s) Oral two times a day    PRN Inpatient Medications  acetaminophen     Tablet .. 650 milliGRAM(s) Oral every 6 hours PRN      REVIEW OF SYSTEMS  --------------------------------------------------------------------------------  Gen: No fevers/chills  Skin: No rashes  Head/Eyes/Ears: Normal hearing,   Respiratory: No dyspnea, cough  CV: No chest pain  GI: No abdominal pain, diarrhea  : No dysuria, hematuria  All other systems were reviewed and are negative, except as noted.    VITALS/PHYSICAL EXAM  --------------------------------------------------------------------------------  T(C): 36.6 (01-25-22 @ 13:50), Max: 37 (01-25-22 @ 00:45)  HR: 69 (01-25-22 @ 13:50) (63 - 160)  BP: 130/71 (01-25-22 @ 13:50) (64/42 - 134/73)  RR: 18 (01-25-22 @ 13:50) (18 - 19)  SpO2: 96% (01-25-22 @ 13:50) (96% - 100%)  Wt(kg): --      01-24-22 @ 07:01  -  01-25-22 @ 07:00  --------------------------------------------------------  IN: 1200 mL / OUT: 1100 mL / NET: 100 mL    Physical Exam:  	Gen: NAD  	HEENT: MMM  	Pulm: CTA B/L, no crackles   	CV: S1S2  	Abd: Soft, +BS   	Ext: trace LE edema B/L  	Neuro: Awake  	Skin: Warm and dry  	Vascular access: RIJ non-tunneled HD catheter     LABS/STUDIES  --------------------------------------------------------------------------------              7.8    11.61 >-----------<  246      [01-25-22 @ 11:06]              24.5     133  |  98  |  46  ----------------------------<  76      [01-25-22 @ 08:08]  5.0   |  24  |  8.07        Ca     7.4     [01-25-22 @ 08:08]      Mg     2.20     [01-25-22 @ 08:08]      Phos  4.8     [01-25-22 @ 08:08]    Creatinine Trend:  SCr 8.07 [01-25 @ 08:08]  SCr 6.96 [01-25 @ 01:55]  SCr 10.79 [01-24 @ 06:47]  SCr 8.59 [01-23 @ 05:01]  SCr 8.04 [01-22 @ 18:34]    Urinalysis - [01-20-22 @ 02:48]      Color Dark Brown / Appearance Turbid / SG 1.039 / pH 8.5      Gluc Trace / Ketone Negative  / Bili Negative / Urobili <2 mg/dL       Blood Moderate / Protein >600 mg/dL / Leuk Est Negative / Nitrite Negative      RBC >50 / WBC >50 / Hyaline 4-8 / Gran  / Sq Epi  / Non Sq Epi  / Bacteria Moderate    Urine Creatinine <4      [01-20-22 @ 07:25]  Urine Sodium 154      [01-20-22 @ 07:25]  Urine Osmolality 275      [01-20-22 @ 07:25]    HBsAb <3.0      [01-21-22 @ 09:28]  HBsAb Nonreact      [01-21-22 @ 09:29]  HBsAg Nonreact      [01-21-22 @ 09:29]  HBcAb Nonreact      [01-21-22 @ 09:29]  HCV 0.13, Nonreact      [01-21-22 @ 09:28]

## 2022-01-25 NOTE — RAPID RESPONSE TEAM SUMMARY - NSSITUATIONBACKGROUNDRRT_GEN_ALL_CORE
89M here for gross hematuria 2/2 UVJ stone, found [address] acute renal failure requiring HD, s/p MICU for this reason. COVID+ 1/20. RRT called for hypotension and tachycardia during dialysis. SBP 70s, HR 150s. Patient fully awake, alert, and oriented. Denies complaints, CP, SOB, belly pains, bladder pains. +gross hematuria noted in Leija. Urology consulted, came to bedside, irrigated, and Leija cleared after 2 flushes, c/w old blood, not active hemorrhage. EKG with atrial tachycardia. Suspect 2/2 electrolyte and fluid shift during dialysis. Patient appears mildly hypovolemic, thus administered 1L LR bolus. Afebrile rectal temp 98 F. Planned to give 2 u prbc given initial hematuria, but will await results of labs - cbc, cmp, vbg - showing stable hemoglobin. T&S as well as coags sent as well as there was not active T&S. Ordered for O+ blood empirically, but deferred due to great response to IVF and normalization of HR to 110-120s, as well as lower suspicion of hemodynamically significant bleeding from Leija. Primary team to follow up labwork. Received 2 hours of HD total. 89M here for gross hematuria 2/2 UVJ stone, found [address] acute renal failure requiring HD, s/p MICU for this reason. COVID+ 1/20. RRT called for hypotension and tachycardia during dialysis. SBP 70s, HR 150s. Patient fully awake, alert, and oriented. Denies complaints, CP, SOB, belly pains, bladder pains. +gross hematuria noted in Leija. Urology consulted, came to bedside, irrigated, and Leija cleared after 2 flushes, c/w old blood, not active hemorrhage. EKG with atrial tachycardia. Suspect 2/2 electrolyte and fluid shift during dialysis. Patient appears mildly hypovolemic, thus administered 1L LR bolus. Afebrile rectal temp 98 F. Planned to give 2 u prbc given initial hematuria, but will await results of labs - cbc, cmp, vbg - showing stable hemoglobin. T&S as well as coags sent as well as there was not active T&S. Ordered for O+ blood empirically, but deferred due to great response to IVF to /78 and trend toward normalization of HR to 110-120s, as well as lower suspicion of hemodynamically significant bleeding from Leija. Primary team to follow up labwork. Received 2 hours of HD total.

## 2022-01-25 NOTE — PROGRESS NOTE ADULT - PROBLEM SELECTOR PLAN 5
-Na 122 on admission, now 130 s/p HD, appropriately corrected -Hgb 6.5 on admission s/p 2U now 7.2  -likely 2/2 acute blood loss from hematuria for 2 weeks, will transfuse to Hgb>7

## 2022-01-25 NOTE — RAPID RESPONSE TEAM SUMMARY - NSSITUATIONBACKGROUNDRRT_GEN_ALL_CORE
89M here for gross hematuria 2/2 UVJ stone, found [address] acute renal failure requiring HD, s/p MICU for this reason. COVID+ 1/20. RRT called for hypotension and tachycardia post dialysis, recent RRT for hypotension and tachycardia s/p fluids with good response. Denies any complaints, AOx3.

## 2022-01-25 NOTE — RAPID RESPONSE TEAM SUMMARY - NSADDTLFINDINGSRRT_GEN_ALL_CORE
BP 60s/40s, HR 160s, RR 12 sat 100% RA, right arm swollen, likely infiltrated IV. EKG with SVTs to 160. Afebrile, BP 60s/40s, HR 160s, RR 12 sat 100% RA, right arm swollen, likely infiltrated IV. EKG with SVTs to 160.

## 2022-01-25 NOTE — PROGRESS NOTE ADULT - PROBLEM SELECTOR PLAN 1
Pt. with KOLBY in the setting of obstructive uropathy/B/L hydronephrosis. No prior history of kidney failure. Scr WNL in 2/2019. Scr on admission (01/20) elevated at 9.87. Pt started on HD on 01/20 for persistent hyperkalemia. Pt. underwent last HD on 01/24/22, however was terminated after ~120 minutes due to tachycardia. Today K is 5.(non-hemolyzed). Pt. clinically stable. Labs reviewed. Plan for HD tomorrow. IR note reviewed. Urology team following for hematuria and hydronephrosis. Monitor labs and urine output. Avoid nephrotoxins. Dose medications as per eGFR.

## 2022-01-25 NOTE — PROGRESS NOTE ADULT - SUBJECTIVE AND OBJECTIVE BOX
Urology Preop Note    Diagnosis: BL hydronephrosis  Procedure: cystoscopy, clot evacuation, fulguration, bilateral ureteral stent insertion  Surgeon: Dr. Yeager                          7.1    10.44 )-----------( 220      ( 2022 01:55 )             22.3           132<L>  |  97<L>  |  41<H>  ----------------------------<  80  4.2   |  26  |  6.96<H>    Ca    7.3<L>      2022 01:55  Phos  3.6       Mg     2.10         TPro  5.2<L>  /  Alb  2.1<L>  /  TBili  0.4  /  DBili  x   /  AST  18  /  ALT  12  /  AlkPhos  65        PT/INR - ( 2022 01:55 )   PT: 13.0 sec;   INR: 1.14 ratio         PTT - ( 2022 01:55 )  PTT:31.5 sec      UCx: neg    EK22  Diagnosis Line Sinus tachycardia with 1st degree A-V block with Fusion complexes  Otherwise normal ECG    CXR: 22  FINDINGS:  Interval placement of a right internal jugular catheter terminating in   the superior vena cava.  The lungs are clear. No pleural effusion or pneumothorax.  Heart appears normal in size.  Degenerative changes of the spine.  IMPRESSION:  Status post hemodialysis catheter placement without pneumothorax.        Orders:  NPO  IVF  Consent to be obtained  Medical clearance documented

## 2022-01-25 NOTE — PROGRESS NOTE ADULT - SUBJECTIVE AND OBJECTIVE BOX
Progress Note    01-20-22 (5d)  -----------------------------------------------------------------------------------------------------------  GARETH Reynaga PGY1  Pager# [phone]  After 7pm, please page night float pager #[phone]  ------------------------------------------------------------------------------------------------------------    Patient is a 89y old  Male who presents with a chief complaint of Hematuria, AMS (25 Jan 2022 07:20)      Subjective / Overnight Events :  - pt had RRT called last night for tachycardia to 157 and hypotension to 76/56 while on HD. Pt remained comfortable during event and he responded well to IVFs with SBPs to 110-120s. There was concern for active bleed given dark red blood in urine bag and urology was called- was old blood. Hgb was stable.   - Urology to take pt to OR today for cystoscopy. Pt made NPO, RN made aware.     Additional ROS (if any):    MEDICATIONS  (STANDING):  chlorhexidine 2% Cloths 1 Application(s) Topical daily  influenza  Vaccine (HIGH DOSE) 0.7 milliLiter(s) IntraMuscular once  lactulose Syrup 10 Gram(s) Oral two times a day    MEDICATIONS  (PRN):  acetaminophen     Tablet .. 650 milliGRAM(s) Oral every 6 hours PRN Temp greater or equal to 38C (100.4F), Mild Pain (1 - 3), Moderate Pain (4 - 6)      CAPILLARY BLOOD GLUCOSE      POCT Blood Glucose.: 84 mg/dL (25 Jan 2022 00:24)    I&O's Summary    24 Jan 2022 07:01  -  25 Jan 2022 07:00  --------------------------------------------------------  IN: 1200 mL / OUT: 1100 mL / NET: 100 mL        PHYSICAL EXAM:  Vital Signs Last 24 Hrs  T(C): 36.8 (25 Jan 2022 04:50), Max: 37 (25 Jan 2022 00:45)  T(F): 98.3 (25 Jan 2022 04:50), Max: 98.6 (25 Jan 2022 00:45)  HR: 65 (25 Jan 2022 04:50) (63 - 132)  BP: 101/63 (25 Jan 2022 04:50) (101/63 - 134/73)  BP(mean): --  RR: 18 (25 Jan 2022 04:50) (18 - 19)  SpO2: 100% (25 Jan 2022 04:50) (98% - 100%)    General: NAD, non-toxic appearing   HEENT: PERRLA, EOMi, no scleral icterus  CV: RRR, normal S1 and S2, no m/r/g  Lungs: normal respiratory effort. CTAB, no wheezes, rales, or rhonchi  Abd: soft, nontender, nondistended  Ext: no edema, 2+ peripheral pulses   Pysch: AAOx3, appropriate affect   Neuro: grossly non-focal, moving all extremities spontaneously   Skin: no rashes or lesions     LABS:                          7.1    10.44 )-----------( 220      ( 25 Jan 2022 01:55 )             22.3       WBC Trend: 10.44<--, 11.12<--, 10.11<--  Hb Trend: 7.1<--, 7.3<--, 7.2<--, 7.3<--, 8.4<--    01-25    132<L>  |  97<L>  |  41<H>  ----------------------------<  80  4.2   |  26  |  6.96<H>    Ca    7.3<L>      25 Jan 2022 01:55  Phos  3.6     01-25  Mg     2.10     01-25    TPro  5.2<L>  /  Alb  2.1<L>  /  TBili  0.4  /  DBili  x   /  AST  18  /  ALT  12  /  AlkPhos  65  01-25    PT/INR - ( 25 Jan 2022 01:55 )   PT: 13.0 sec;   INR: 1.14 ratio         PTT - ( 25 Jan 2022 01:55 )  PTT:31.5 sec          Culture - Blood (collected 22 Jan 2022 08:13)  Source: .Blood Blood-Venous  Preliminary Report (23 Jan 2022 09:01):    No growth to date.    Culture - Blood (collected 22 Jan 2022 08:13)  Source: .Blood Blood-Peripheral  Preliminary Report (23 Jan 2022 09:01):    No growth to date.        RADIOLOGY & ADDITIONAL TESTS: Reviewed Progress Note    01-20-22 (5d)  -----------------------------------------------------------------------------------------------------------  GARETH Reynaga PGY1  Pager# [phone]  After 7pm, please page night float pager #[phone]  ------------------------------------------------------------------------------------------------------------    Patient is a 89y old  Male who presents with a chief complaint of Hematuria, AMS (25 Jan 2022 07:20)      Subjective / Overnight Events :  - pt had RRT called last night for tachycardia to 157 and hypotension to 76/56 while on HD. Pt remained comfortable during event and he responded well to IVFs with SBPs to 110-120s. There was concern for active bleed given dark red blood in urine bag and urology was called- was old blood. Hgb was stable.   - pt was NSR earlier this AM during pre-rounds, with no acute tele events- HR 70s-80s, mildly prolonged WI interval. had no acute complaints  - later, developed sustained tachycardia to 160s and became hypotensive to SBP 60s (first time occurring off HD), RRT was called. EKG w/ SVT to 160s, converted back to sinus 70-80s with normalization of BP (110s/50s) after midodrine 20mg, 500c IV bolus, and amiodarone 150mg IVP.   - urology initially planned to take pt to OR, now cancelled     Additional ROS (if any):    MEDICATIONS  (STANDING):  chlorhexidine 2% Cloths 1 Application(s) Topical daily  influenza  Vaccine (HIGH DOSE) 0.7 milliLiter(s) IntraMuscular once  lactulose Syrup 10 Gram(s) Oral two times a day    MEDICATIONS  (PRN):  acetaminophen     Tablet .. 650 milliGRAM(s) Oral every 6 hours PRN Temp greater or equal to 38C (100.4F), Mild Pain (1 - 3), Moderate Pain (4 - 6)      CAPILLARY BLOOD GLUCOSE      POCT Blood Glucose.: 84 mg/dL (25 Jan 2022 00:24)    I&O's Summary    24 Jan 2022 07:01  -  25 Jan 2022 07:00  --------------------------------------------------------  IN: 1200 mL / OUT: 1100 mL / NET: 100 mL        PHYSICAL EXAM:  Vital Signs Last 24 Hrs  T(C): 36.8 (25 Jan 2022 04:50), Max: 37 (25 Jan 2022 00:45)  T(F): 98.3 (25 Jan 2022 04:50), Max: 98.6 (25 Jan 2022 00:45)  HR: 65 (25 Jan 2022 04:50) (63 - 132)  BP: 101/63 (25 Jan 2022 04:50) (101/63 - 134/73)  BP(mean): --  RR: 18 (25 Jan 2022 04:50) (18 - 19)  SpO2: 100% (25 Jan 2022 04:50) (98% - 100%)    General: NAD, non-toxic appearing   HEENT: PERRLA, EOMi, no scleral icterus  CV: RRR, normal S1 and S2, no m/r/g  Lungs: normal respiratory effort. CTAB, no wheezes, rales, or rhonchi  Abd: soft, nontender, nondistended  Ext: no edema, 2+ peripheral pulses   Pysch: AAOx3, appropriate affect   Neuro: grossly non-focal, moving all extremities spontaneously   Skin: no rashes or lesions     LABS:                          7.1    10.44 )-----------( 220      ( 25 Jan 2022 01:55 )             22.3       WBC Trend: 10.44<--, 11.12<--, 10.11<--  Hb Trend: 7.1<--, 7.3<--, 7.2<--, 7.3<--, 8.4<--    01-25    132<L>  |  97<L>  |  41<H>  ----------------------------<  80  4.2   |  26  |  6.96<H>    Ca    7.3<L>      25 Jan 2022 01:55  Phos  3.6     01-25  Mg     2.10     01-25    TPro  5.2<L>  /  Alb  2.1<L>  /  TBili  0.4  /  DBili  x   /  AST  18  /  ALT  12  /  AlkPhos  65  01-25    PT/INR - ( 25 Jan 2022 01:55 )   PT: 13.0 sec;   INR: 1.14 ratio         PTT - ( 25 Jan 2022 01:55 )  PTT:31.5 sec          Culture - Blood (collected 22 Jan 2022 08:13)  Source: .Blood Blood-Venous  Preliminary Report (23 Jan 2022 09:01):    No growth to date.    Culture - Blood (collected 22 Jan 2022 08:13)  Source: .Blood Blood-Peripheral  Preliminary Report (23 Jan 2022 09:01):    No growth to date.        RADIOLOGY & ADDITIONAL TESTS: Reviewed

## 2022-01-25 NOTE — PROGRESS NOTE ADULT - PROBLEM SELECTOR PLAN 7
Diet: Regular  DVT: Holding i/s/o bleeding   Dispo: Pending PT eval  Code: DNR/DNI  Communication: Zuleyka Ferrer 1/24 4:20PM

## 2022-01-25 NOTE — PROGRESS NOTE ADULT - PROBLEM SELECTOR PLAN 6
-Hgb 6.5 on admission s/p 2U now 7.2  -likely 2/2 acute blood loss from hematuria for 2 weeks, will transfuse to Hgb>7 Diet: Regular  DVT: Holding i/s/o bleeding   Dispo: Pending PT eval  Code: DNR/DNI  Communication: Zuleyka Ferrer 1/24 4:20PM

## 2022-01-25 NOTE — CONSULT NOTE ADULT - SUBJECTIVE AND OBJECTIVE BOX
Inrventional Radiology    Evaluate for Procedure: PCNplacement    HPI: 89y Male with prostate ca and hematuria with clot burden found to have b/l hydro (r>L) on prior CT and US from a few days ago. Patient in ARF on HD. Patient with RRT overnight and HD instability. Urology cancelled OR due to HD instability and IR consulted for PCN placement with recommendation for unilateral placement for quality of life purposes.     Allergies:   Medications (Abx/Cardiac/Anticoagulation/Blood Products)  alteplase for catheter clearance: 2 milliGRAM(s) Catheter (01-24 @ 22:13)  alteplase for catheter clearance: 2 milliGRAM(s) Catheter (01-24 @ 22:13)    Data:    T(C): 36.6  HR: 69  BP: 130/71  RR: 18  SpO2: 96%    -WBC 11.61 / HgB 7.8 / Hct 24.5 / Plt 246  -Na 133 / Cl 98 / BUN 46 / Glucose 76  -K 5.0 / CO2 24 / Cr 8.07  -ALT -- / Alk Phos -- / T.Bili --  -INR 1.14 / PTT 31.5      Radiology: reviewed    Assessment/Plan: 89 yo M with ARF and b/l hydro, initially planned for ureteral stents by urology but cancelled 2/2 HD instability    -- Obtain repeat US as last one was 72 hours ago  --Would defer nephrostomy tube placement at this time given HD instability. Additionally, for quality of life purposes, ureteral stents are better option  --When pt stable, defer to urology for stent placement Inrventional Radiology    Evaluate for Procedure: PCN placement    HPI: 89y Male with prostate ca and hematuria with clot burden found to have b/l hydro (r>L) on prior CT and US from a few days ago. Patient in ARF on HD. Patient with RRT overnight and HD instability. Urology cancelled OR due to HD instability and IR consulted for PCN placement with recommendation for unilateral placement for quality of life purposes.     Allergies:   Medications (Abx/Cardiac/Anticoagulation/Blood Products)  alteplase for catheter clearance: 2 milliGRAM(s) Catheter (01-24 @ 22:13)  alteplase for catheter clearance: 2 milliGRAM(s) Catheter (01-24 @ 22:13)    Data:    T(C): 36.6  HR: 69  BP: 130/71  RR: 18  SpO2: 96%    -WBC 11.61 / HgB 7.8 / Hct 24.5 / Plt 246  -Na 133 / Cl 98 / BUN 46 / Glucose 76  -K 5.0 / CO2 24 / Cr 8.07  -ALT -- / Alk Phos -- / T.Bili --  -INR 1.14 / PTT 31.5      Radiology: reviewed    Assessment/Plan: 89 yo M with ARF and b/l hydro, initially planned for ureteral stents by urology but cancelled 2/2 HD instability    -- Would obtain repeat renal, US as last one was 72 hours ago  -- Would defer nephrostomy tube placement at this time, as patient is afebrile with normal WBC and VS.  -- When pt stable, defer to urology for stent placement, as ureteral stents are less invasive and would be a better option for quality of life purposes

## 2022-01-25 NOTE — CONSULT NOTE ADULT - SUBJECTIVE AND OBJECTIVE BOX
CHIEF COMPLAINT:  Called to evaluate patient with SVT    HISTORY OF PRESENT ILLNESS:  Mr. Aguilar is a 89 year old M with PMH of HTN, prostate cancer s/p XRT, and previous cystoscopy with bladder tumor removal on 10/28/2021 at [facility] presented with 2 week history of hematuria, malaise, and lethargy. As per family, patient had recovered well from the cystoscopy in October but was known to have persistent anemia after the procedure. Patient was ambulating independently and A&Ox3 until appx. 2 weeks ago when he began to have hematuria, increased urinary frequency, and was witnessed to be more lethargic and confused at which point he was brought to the ED.   In the ED, patient was found to have anemia with Hgb of 6.5, K of 7.2, and Cr of 9.87. Lactate was initially elevated at 2.2 but uptrended to 4.0. He was given a unit of PRBC to address the anemia. EKG showed no acute changes of hyperkalemia and he received calcium gluconate, albuterol, lasix, insulin, and dextrose without change in K level, but patient remained stable without complaints. Renal was consulted for the patient's hyperkalemia and KOLBY and was re-dosed with calcium, lokelma, insulin, dextrose, and albuterol. Ultrasound was conducted for low UOP that showed bilateral hydronephrosis and substantial blood clot in bladder with associated distention + hydroureter. Patient was found to be in acute renal failure 2/2 heavy clot burden with persistent hyponatremia and hyperkalemia requiring initiation of urgent dialysis via R IJ shiley. Patient is also found to be COVID +. He had episode of supraventricular tachycardia and hypotension after HD early morning and RRT was called. He was given IV fluids and BP and HR improved. Patient had another episode of SVT with HR in 160s around 11:30am with hypotension. Amiodarone 150mg IV x1 and Midodrine was given for hypotension. Patient is resting in bed now and denies any chest pain, SOB, palpitations or dizziness. Telemetry currently reveals sinus rhythm with HR 60s-70s.       PAST MEDICAL & SURGICAL HISTORY:  Hypertension    Prostate cancer    No significant past surgical history    PREVIOUS DIAGNOSTIC TESTING:    Echocardiogram:  pending    MEDICATIONS:  acetaminophen     Tablet .. 650 milliGRAM(s) Oral every 6 hours PRN  lactulose Syrup 10 Gram(s) Oral two times a day  chlorhexidine 2% Cloths 1 Application(s) Topical daily  influenza  Vaccine (HIGH DOSE) 0.7 milliLiter(s) IntraMuscular once      FAMILY HISTORY:  No pertinent family history in first degree relatives        SOCIAL HISTORY:      [-] Smoker  [-] Alcohol  [-] Drugs    Allergies    No Known Allergies    Intolerances    	    REVIEW OF SYSTEMS:  CONSTITUTIONAL: No fever, weight loss, + fatigue  EYES: No eye pain, visual disturbances, or discharge  ENMT:  No difficulty hearing, tinnitus, vertigo; No sinus or throat pain  NECK: No pain or stiffness  RESPIRATORY: No cough, wheezing, chills or hemoptysis; No Shortness of Breath  CARDIOVASCULAR: No chest pain, palpitations, passing out, dizziness, or leg swelling  GASTROINTESTINAL: No abdominal or epigastric pain. No nausea, vomiting, or hematemesis; No diarrhea or constipation. No melena or hematochezia.  GENITOURINARY: No dysuria, frequency, hematuria, or incontinence  NEUROLOGICAL: No headaches, memory loss, loss of strength, numbness, or tremors  SKIN: No itching, burning, rashes, or lesions   LYMPH Nodes: No enlarged glands  ENDOCRINE: No heat or cold intolerance; No hair loss  MUSCULOSKELETAL: No joint pain or swelling; No muscle, back, or extremity pain  PSYCHIATRIC: No depression, anxiety, mood swings, or difficulty sleeping  HEME/LYMPH: No easy bruising, or bleeding gums  ALLERY AND IMMUNOLOGIC: No hives or eczema	    [X] All others negative	  [ ] Unable to obtain    PHYSICAL EXAM:  T(C): 36.6 (01-25-22 @ 13:50), Max: 37 (01-25-22 @ 00:45)  HR: 69 (01-25-22 @ 13:50) (63 - 160)  BP: 130/71 (01-25-22 @ 13:50) (64/42 - 134/73)  RR: 18 (01-25-22 @ 13:50) (18 - 19)  SpO2: 96% (01-25-22 @ 13:50) (96% - 100%)  Wt(kg): --  I&O's Summary    24 Jan 2022 07:01  -  25 Jan 2022 07:00  --------------------------------------------------------  IN: 1200 mL / OUT: 1100 mL / NET: 100 mL        Appearance: Normal	  Cardiovascular: Normal S1 S2, No JVD, No murmurs, No edema  Respiratory: Lungs clear to auscultation	  Psychiatry: A & O x 3, Mood & affect appropriate  Gastrointestinal:  Soft, Non-tender, + BS	  Skin: No rashes, No ecchymoses, No cyanosis	  Neurologic: Non-focal  Extremities: Normal range of motion, No clubbing, cyanosis or edema    TELEMETRY: Sinus rhythm with HR 60s-70s; PVCs; first degree AV block	    ECG:  Supraventricular tachycardia with  bpm; Qrs 140ms	  RADIOLOGY:  OTHER: 	  	  LABS:	 	    CARDIAC MARKERS:                        7.8    11.61 )-----------( 246      ( 25 Jan 2022 11:06 )             24.5     01-25    133<L>  |  98  |  46<H>  ----------------------------<  76  5.0   |  24  |  8.07<H>    Ca    7.4<L>      25 Jan 2022 08:08  Phos  4.8     01-25  Mg     2.20     01-25    TPro  5.2<L>  /  Alb  2.1<L>  /  TBili  0.4  /  DBili  x   /  AST  18  /  ALT  12  /  AlkPhos  65  01-25    TSH: pending

## 2022-01-25 NOTE — PROGRESS NOTE ADULT - PROBLEM SELECTOR PLAN 2
Pt. with hyperkalemia in the setting of renal failure and obstructive uropathy. Serum potassium elevated at 7.2 on admission. Pt. initiated on HD on 1/20 for persistent hyperkalemia. Last HD done 01/24/22. Serum potassium WNL at 5 today. No plan for HD today. IR note reviewed for catheter exchange. Low potassium diet. Monitor serum potassium.

## 2022-01-25 NOTE — CHART NOTE - NSCHARTNOTEFT_GEN_A_CORE
Discussed with covering resident regarding nontunneled catheter exchange as reviewed on morning rounds today. Catheter tip is not in heart and should not predispose to tachycardia during dialysis. A catheter that IR would place would be more distal and would predispose more to possible tachycardia. Etiology of patients tachycardia during dialysis is unclear at this time. Patient is to go to OR per urology today. Discussed with covering resident that procedure is on hold for now and will see how patient does after OR. Team to reach back out to IR if needed.

## 2022-01-25 NOTE — PROGRESS NOTE ADULT - PROBLEM SELECTOR PLAN 4
- Prostate cancer s/p XRT, and previous cystoscopy with bladder tumor removal on 10/28/2021 at [facility]  -hematuria for 2 weeks, s/p cystoscopy + tumor removal in October  -Leija placed by urology, SHAII clamped 1/22, no TOV this admission -Na 122 on admission, now 130 s/p HD, appropriately corrected

## 2022-01-25 NOTE — RAPID RESPONSE TEAM SUMMARY - NSOTHERINTERVENTIONSRRT_GEN_ALL_CORE
right arm line removed, placed IV during RRT. Rapid nurse to place another U/S guided IV. Primary team updated at bedside. Cardiology to f/u.
- 1 L LR

## 2022-01-25 NOTE — PROGRESS NOTE ADULT - PROBLEM SELECTOR PLAN 1
-K 7.2 on admission s/p 2 abbreviated HD sessions 1/22, no EKG changes   - 2/2 ARF likely 2/2 chronic obstruction 2/2 clot burden given B/L hydronephrosis  - cont to trend: 4.9 > 5.4

## 2022-01-25 NOTE — PROGRESS NOTE ADULT - ASSESSMENT
89M Hx HTN, prostate cancer s/p XRT, and previous cystoscopy with bladder tumor removal on 10/28/2021 at [facility] presents with 2 week history of hematuria, malaise, and lethargy found to be in acute renal failure 2/2 heavy clot burden with persistent hyponatremia and hyperkalemia requiring urgent HD now transferred back to floors and may be unable to tolerate HD- assessing on a daily basis. Now off CBI since 1/22.  89M Hx HTN, prostate cancer s/p XRT, and previous cystoscopy with bladder tumor removal on 10/28/2021 at [facility] presents with 2 week history of hematuria, malaise, and lethargy found to be in acute renal failure 2/2 heavy clot burden with persistent hyponatremia and hyperkalemia requiring initiation of urgent dialysis via R IJ shiley. Course c/b ?SVTs with hypotension with 2 RRTs.

## 2022-01-25 NOTE — PROGRESS NOTE ADULT - PROBLEM SELECTOR PLAN 3
Urology planning for cystoscopy, clot evacuation, fulguration, and b/l ureteral stent insertion 1/26   - METS approx 3-4  - RCRI score 1 with 6% 30 day risk of death, MI, or cardiac arrest for low risk procedure  - currently medically optimized for above procedures - Prostate cancer s/p XRT, and previous cystoscopy with bladder tumor removal on 10/28/2021 at [facility]  -hematuria for 2 weeks, s/p cystoscopy + tumor removal in October  -Leija placed by urology, SHAII clamped 1/22, no TOV this admission

## 2022-01-25 NOTE — CHART NOTE - NSCHARTNOTEFT_GEN_A_CORE
Planned for OR today for bilateral ureteral stents in setting of KOLBY and hydronephrosis, however patient hemodynamically unstable and second rapid response called today. Discussed with attending, Dr. Yeager -- patient is too high risk for operative procedure under general anesthesia at this time and OR procedure is cancelled. Recommend IR consultation for PCN insertion under local anesthesia given hemodynamic instability and inability to tolerate HD. Consider unilateral PCN for quality of life purposes vs bilateral PCNs. If unilateral, left kidney appears to have more parenchyma on CTAP and may be better option. Also recommend consideration of CVVH as patient is unable to tolerate HD.    Discussed with primary team, Dr. Fitzpatrick, and urology attending, Dr. Yeager.

## 2022-01-26 NOTE — PROGRESS NOTE ADULT - SUBJECTIVE AND OBJECTIVE BOX
[facility] DIVISION OF KIDNEY DISEASES AND HYPERTENSION -- FOLLOW UP NOTE  --------------------------------------------------------------------------------  Chief Complaint: KOLBY    24 hour events/subjective:  Patient with no overnight events, no complaints this AM      PAST HISTORY  --------------------------------------------------------------------------------  No significant changes to PMH, PSH, FHx, SHx, unless otherwise noted    ALLERGIES & MEDICATIONS  --------------------------------------------------------------------------------  Allergies    No Known Allergies    Intolerances      Standing Inpatient Medications  chlorhexidine 2% Cloths 1 Application(s) Topical daily  influenza  Vaccine (HIGH DOSE) 0.7 milliLiter(s) IntraMuscular once  lactulose Syrup 10 Gram(s) Oral two times a day  metoprolol tartrate 12.5 milliGRAM(s) Oral every 12 hours    PRN Inpatient Medications  acetaminophen     Tablet .. 650 milliGRAM(s) Oral every 6 hours PRN        VITALS/PHYSICAL EXAM  --------------------------------------------------------------------------------  T(C): 37 (01-26-22 @ 11:26), Max: 37 (01-26-22 @ 11:26)  HR: 67 (01-26-22 @ 11:26) (61 - 69)  BP: 120/61 (01-26-22 @ 11:26) (115/60 - 130/71)  RR: 18 (01-26-22 @ 11:26) (17 - 18)  SpO2: 100% (01-26-22 @ 11:26) (96% - 100%)  Wt(kg): --        01-25-22 @ 07:01  -  01-26-22 @ 07:00  --------------------------------------------------------  IN: 0 mL / OUT: 500 mL / NET: -500 mL      Physical Exam:              Gen: NAD  	HEENT: MMM  	Pulm: CTA B/L, no crackles   	CV: S1S2  	Abd: Soft, +BS   	Ext: trace LE edema B/L  	Neuro: Awake  	Skin: Warm and dry  	Vascular access: RIJ non-tunneled HD catheter                : Liss with gross blood        LABS/STUDIES  --------------------------------------------------------------------------------              7.8    11.61 >-----------<  246      [01-25-22 @ 11:06]              24.5     133  |  98  |  46  ----------------------------<  76      [01-25-22 @ 08:08]  5.0   |  24  |  8.07        Ca     7.4     [01-25-22 @ 08:08]      Mg     2.20     [01-25-22 @ 08:08]      Phos  4.8     [01-25-22 @ 08:08]    TPro  5.2  /  Alb  2.1  /  TBili  0.4  /  DBili  x   /  AST  18  /  ALT  12  /  AlkPhos  65  [01-25-22 @ 01:55]    PT/INR: PT 13.0 , INR 1.14       [01-25-22 @ 01:55]  PTT: 31.5       [01-25-22 @ 01:55]      Creatinine Trend:  SCr 8.07 [01-25 @ 08:08]  SCr 6.96 [01-25 @ 01:55]  SCr 10.79 [01-24 @ 06:47]  SCr 8.59 [01-23 @ 05:01]  SCr 8.04 [01-22 @ 18:34]    Urinalysis - [01-20-22 @ 02:48]      Color Dark Brown / Appearance Turbid / SG 1.039 / pH 8.5      Gluc Trace / Ketone Negative  / Bili Negative / Urobili <2 mg/dL       Blood Moderate / Protein >600 mg/dL / Leuk Est Negative / Nitrite Negative      RBC >50 / WBC >50 / Hyaline 4-8 / Gran  / Sq Epi  / Non Sq Epi  / Bacteria Moderate    Urine Creatinine <4      [01-20-22 @ 07:25]  Urine Sodium 154      [01-20-22 @ 07:25]  Urine Osmolality 275      [01-20-22 @ 07:25]    Iron 126, TIBC 190, %sat 66      [01-20-22 @ 18:42]  Ferritin 170      [01-21-22 @ 03:14]  HbA1c 5.7      [12-30-18 @ 14:04]    HBsAb <3.0      [01-21-22 @ 09:28]  HBsAb Nonreact      [01-21-22 @ 09:29]  HBsAg Nonreact      [01-21-22 @ 09:29]  HBcAb Nonreact      [01-21-22 @ 09:29]  HCV 0.13, Nonreact      [01-21-22 @ 09:28]

## 2022-01-26 NOTE — PROGRESS NOTE ADULT - PROBLEM SELECTOR PLAN 4
-Na 122 on admission, now 130 s/p HD, appropriately corrected -Na 122 on admission, now improved s/p HD, appropriately corrected

## 2022-01-26 NOTE — PROGRESS NOTE ADULT - PROBLEM SELECTOR PLAN 5
-Hgb 6.5 on admission s/p 2U now 7.2  -likely 2/2 acute blood loss from hematuria for 2 weeks, will transfuse to Hgb>7

## 2022-01-26 NOTE — PROGRESS NOTE ADULT - PROBLEM SELECTOR PLAN 6
Diet: Regular  DVT: Holding i/s/o bleeding   Dispo: Pending PT eval  Code: DNR/DNI  Communication: Zuleyka Ferrer 1/24 4:20PM Diet: Regular  DVT: Holding i/s/o bleeding   Dispo: Pending PT eval  Code: DNR/DNI  Communication: SonArya 1/26 over phone

## 2022-01-26 NOTE — PROGRESS NOTE ADULT - SUBJECTIVE AND OBJECTIVE BOX
Progress Note    01-20-22 (6d)  -----------------------------------------------------------------------------------------------------------  GARETH Reynaga PGY1  Pager# [phone]  After 7pm, please page night float pager #[phone]  ------------------------------------------------------------------------------------------------------------    Patient is a 89y old  Male who presents with a chief complaint of Hematuria, AMS (26 Jan 2022 10:06)      Subjective / Overnight Events :  - No acute events overnight  - Feels well this AM, no acute complaints at this time. b/l arms edematous from IV infiltration during RRT yesterday, no pain.   - No acute tele events- HR ranging 60s-70s with PVCs, possible AV block     Additional ROS (if any):    MEDICATIONS  (STANDING):  chlorhexidine 2% Cloths 1 Application(s) Topical daily  influenza  Vaccine (HIGH DOSE) 0.7 milliLiter(s) IntraMuscular once  lactulose Syrup 10 Gram(s) Oral two times a day  metoprolol tartrate 12.5 milliGRAM(s) Oral every 12 hours    MEDICATIONS  (PRN):  acetaminophen     Tablet .. 650 milliGRAM(s) Oral every 6 hours PRN Temp greater or equal to 38C (100.4F), Mild Pain (1 - 3), Moderate Pain (4 - 6)      CAPILLARY BLOOD GLUCOSE        I&O's Summary    25 Jan 2022 07:01  -  26 Jan 2022 07:00  --------------------------------------------------------  IN: 0 mL / OUT: 500 mL / NET: -500 mL        PHYSICAL EXAM:  Vital Signs Last 24 Hrs  T(C): 36.7 (26 Jan 2022 05:21), Max: 36.7 (26 Jan 2022 05:21)  T(F): 98 (26 Jan 2022 05:21), Max: 98 (26 Jan 2022 05:21)  HR: 67 (26 Jan 2022 05:21) (61 - 69)  BP: 122/60 (26 Jan 2022 05:21) (115/60 - 130/71)  BP(mean): --  RR: 17 (26 Jan 2022 05:21) (17 - 18)  SpO2: 100% (26 Jan 2022 05:21) (96% - 100%)    General: NAD, non-toxic appearing   HEENT: EOMi, no scleral icterus  CV: RRR, normal S1 and S2, no m/r/g  Lungs: normal respiratory effort. CTAB, no wheezes, rales, or rhonchi  Abd: soft, nontender, nondistended  Ext: b/l UE edema, no LE edema, 2+ peripheral pulses   Pysch: AAOx3, appropriate affect   Neuro: grossly non-focal, moving all extremities spontaneously   Skin: no rashes or lesions     LABS:                          7.8    11.61 )-----------( 246      ( 25 Jan 2022 11:06 )             24.5       WBC Trend: 11.61<--, 10.42<--, 10.44<--  Hb Trend: 7.8<--, 7.5<--, 7.1<--, 7.3<--, 7.2<--    01-25    133<L>  |  98  |  46<H>  ----------------------------<  76  5.0   |  24  |  8.07<H>    Ca    7.4<L>      25 Jan 2022 08:08  Phos  4.8     01-25  Mg     2.20     01-25    TPro  5.2<L>  /  Alb  2.1<L>  /  TBili  0.4  /  DBili  x   /  AST  18  /  ALT  12  /  AlkPhos  65  01-25    PT/INR - ( 25 Jan 2022 01:55 )   PT: 13.0 sec;   INR: 1.14 ratio         PTT - ( 25 Jan 2022 01:55 )  PTT:31.5 sec            RADIOLOGY & ADDITIONAL TESTS: Reviewed

## 2022-01-26 NOTE — PROGRESS NOTE ADULT - SUBJECTIVE AND OBJECTIVE BOX
Subjective  Denies fevers, chills. Urine color significantly improved, now actively draining clear pale pink with minimal debris.  PCN deferred by IR.      Objective    Vital signs  T(F): , Max: 98 (01-26-22 @ 05:21)  HR: 67 (01-26-22 @ 05:21)  BP: 122/60 (01-26-22 @ 05:21)  SpO2: 100% (01-26-22 @ 05:21)  Wt(kg): --    Output     OUT:    Indwelling Catheter - Urethral (mL): 500 mL  Total OUT: 500 mL    Total NET: -500 mL          Gen: NAD  Abd: soft, nontender, nondistended  : mendez secured in place, draining clear pale pink with minimal debris    Labs      01-25 @ 11:06    WBC 11.61 / Hct 24.5  / SCr --       01-25 @ 08:08    WBC 10.42 / Hct 23.9  / SCr 8.07         Culture - Blood (collected 01-22-22 @ 08:13)  Source: .Blood Blood-Venous  Preliminary Report (01-23-22 @ 09:01):    No growth to date.    Culture - Blood (collected 01-22-22 @ 08:13)  Source: .Blood Blood-Peripheral  Preliminary Report (01-23-22 @ 09:01):    No growth to date.    Culture - Nose (collected 01-21-22 @ 21:34)  Source: .Nose  Final Report (01-24-22 @ 09:46):    No staphylococcus aureus isolated.    "PCR is more Sensitive for identifying MRSA/MSSA."    Culture - Urine (collected 01-20-22 @ 09:03)  Source: Clean Catch Clean Catch (Midstream)  Final Report (01-21-22 @ 11:00):    No growth

## 2022-01-26 NOTE — PROGRESS NOTE ADULT - ASSESSMENT
89 year old male with a medical history significant for HTN, prostate ca, s/p XRT, and recent cystoscopy 10/28/21 for a bladder tumor, presenting to the ED with hematuria, malaise, lethargy and altered mental status x 2 weeks, in renal failure with bilateral hydro, obstructive uropathy, gross hematuria, catheter placement and manual irrigation with removal of ~200cc of clot, and started on CBI, anemic, receiving transfusion.     -Gross hematuria improved, now off CBI since 1/22/22 --> no need for CBI at this time as color clears easily with minimal manual irrigation. Recommend manual irrigation at least q shift and prn for dark/opaque color or clots   -Document UOP in flowsheets at least q4h  -Monitor urine color, manually hand irrigate mendez PRN (see above) to clear urine --  use pierre (piston syringe) and inject 60 cc of sterile water into the mendez catheter through the drainage tube (do not irrigate via the side port) and draw back for irrigation, repeat until urine clears  -Continue mendez; would not recommend TOV on this admission, patient can follow up as outpatient for TOV and evaluation or urinary retention  -Monitor Cr and electrolytes  -IR deferred PCN as patient is afebrile with normal WBC and VS stable, however multiple rapid responses called for hemodynamic instability; of note, urology recommending PCN for acute renal failure not sepsis. PCN can be internalized at a future date for quality of life purposes as patient is not candidate for retrograde stent insertion under general anesthesia at this time. Per Dr. Yeager, will reach out to IR directly to clarify recommendations.

## 2022-01-26 NOTE — PROGRESS NOTE ADULT - PROBLEM SELECTOR PLAN 2
Pt. with hyperkalemia in the setting of renal failure and obstructive uropathy. Serum potassium elevated at 7.2 on admission. Pt. initiated on HD on 1/20 for persistent hyperkalemia. Last HD done 01/24/22. Serum potassium at 5 today. Will plan for HD today. Will follow up with  / IR regarding plan for obstruction.

## 2022-01-26 NOTE — PROGRESS NOTE ADULT - ASSESSMENT
Mr. Aguilar is a 89 year old M with PMH of HTN, prostate cancer s/p XRT, and previous cystoscopy with bladder tumor removal on 10/28/2021 at [facility] presented with 2 week history of hematuria, malaise, and lethargy. He was found to be in acute renal failure 2/2 heavy clot burden with persistent hyponatremia and hyperkalemia requiring initiation of urgent dialysis via R IJ shiley. EP was called to evaluate as patient had two episodes SVT with HR up to 160s associated with hypotension. He received one dose of Amiodarone 150 mg IV. Currently not on any AV andrew blockers and is in sinus rhythm with HR 60s-70s.   - Continue to monitor on telemetry  - Maintain K+~4.0 and Mg++~2.0  - Check TFTs  - Start metoprolol 12.5 mg BID if BP tolerates  - Continue management as per primary team   Mr. Aguilar is a 89 year old M with PMH of HTN, prostate cancer s/p XRT, and previous cystoscopy with bladder tumor removal on 10/28/2021 at [facility] presented with 2 week history of hematuria, malaise, and lethargy. He was found to be in acute renal failure 2/2 heavy clot burden with persistent hyponatremia and hyperkalemia requiring initiation of urgent dialysis via R IJ shiley. EP was called to evaluate as patient had two episodes SVT with HR up to 160s associated with hypotension. He received one dose of Amiodarone 150 mg IV. Currently he is in sinus rhythm with HR 60s-70s.   - Continue to monitor on telemetry  - Maintain K+~4.0 and Mg++~2.0  - Check TFTs  - Continue metoprolol and increase dose to 25 mg BID if BP tolerates  - Continue management as per primary team  - Discussed with Dr. Palacios

## 2022-01-26 NOTE — PROGRESS NOTE ADULT - PROBLEM SELECTOR PLAN 1
had ~3 episodes of tachycardia to 160s initially only on HD with 1 ep a/w hypotension. 1/25 had SVTs to 160s a/w hypotension off HD for first time, given amiodarone 150mg IV.   - no prior known cardiac h/o. typically HR 70s-80s on tele w/ possible 1st degree AV block (mildly prolonged DC intervals)  - EP consulted, appreciate recs  - will start metoprolol 12.5mg bid   - replete K >4 and Mg >2  - TTE pending had ~3 episodes of tachycardia to 160s initially only on HD with 1 ep a/w hypotension. 1/25 had SVTs to 160s a/w hypotension off HD for first time, given amiodarone 150mg IV.   - no prior known cardiac h/o. typically HR 70s-80s on tele w/ possible 1st degree AV block (mildly prolonged ME intervals)  - EP consulted, appreciate recs    - will start metoprolol 12.5mg bid   - replete K >4 and Mg >2  - TTE pending

## 2022-01-26 NOTE — PROGRESS NOTE ADULT - PROBLEM SELECTOR PLAN 2
Renal failure in setting of obstructive uropathy + b/l hydronephrosis + anemia. no previous history of renal failure  - creatinine 9.87 on admission. HD via R IJ shiley initiated on 1/20 with a couple of sessions terminated early due to tachycardia to 160  - IR was consulted for shiley adjustment- do not believe shiley is related to tachycardia as catheter tip not in heart  - last HD 1/24, received ~2 hrs, terminated early for tachycardia to 160 w/ hypotension - had RRT called     - f/u nephro re: future HD plans going forward

## 2022-01-26 NOTE — CHART NOTE - NSCHARTNOTEFT_GEN_A_CORE
Discussed with patient at bedside regarding nephrostomy tube placement. Discussed undergoing procedure under local anesthesia only (although this is likely not a feasible option as patient is clinically unstable with multiple RRT's recently and would require either deep sedation of general anesthesia) with the patient. Additionally, discussed the details of the procedure and that a tube and bag would be connected to the outside at least for some time prior to internalization. Patient adamantly refused any procedure where he would be awake or anything coming out of his kidney externally. Risks and benefits were discussed with patient and the reason for the procedure (acute renal failure). Patient verbalized understanding and said he would rather do ureteral stenting (although cannot occur at this time which was also discussed with patient) or nothing. Patient is A&Ox4 and has been making his own decisions, including the DNR order recently. This was discussed with primary internal medicine resident who also concurred that patient has not been wanting any procedure (ureteral stenting or nephrostomy).     Would involve palliative care at this point to determine Goals of care.  Please reach back out to CALVIN SAENZ

## 2022-01-26 NOTE — PROGRESS NOTE ADULT - ASSESSMENT
89M Hx HTN, prostate cancer s/p XRT, and previous cystoscopy with bladder tumor removal on 10/28/2021 at [facility] presents with 2 week history of hematuria, malaise, and lethargy found to be in acute renal failure 2/2 heavy clot burden with persistent hyponatremia and hyperkalemia requiring initiation of urgent dialysis via R IJ shiley. Course c/b ?SVTs with hypotension with 2 RRTs.

## 2022-01-26 NOTE — PROGRESS NOTE ADULT - SUBJECTIVE AND OBJECTIVE BOX
Patient is seen and evaluated. No acute distress noted.    PAST MEDICAL & SURGICAL HISTORY:  Hypertension    Prostate cancer    No significant past surgical history        MEDICATIONS  (STANDING):  chlorhexidine 2% Cloths 1 Application(s) Topical daily  influenza  Vaccine (HIGH DOSE) 0.7 milliLiter(s) IntraMuscular once  lactulose Syrup 10 Gram(s) Oral two times a day  metoprolol tartrate 12.5 milliGRAM(s) Oral every 12 hours    MEDICATIONS  (PRN):  acetaminophen     Tablet .. 650 milliGRAM(s) Oral every 6 hours PRN Temp greater or equal to 38C (100.4F), Mild Pain (1 - 3), Moderate Pain (4 - 6)    Vital Signs Last 24 Hrs  T(C): 36.7 (26 Jan 2022 05:21), Max: 36.7 (26 Jan 2022 05:21)  T(F): 98 (26 Jan 2022 05:21), Max: 98 (26 Jan 2022 05:21)  HR: 67 (26 Jan 2022 05:21) (61 - 69)  BP: 122/60 (26 Jan 2022 05:21) (115/60 - 130/71)  BP(mean): --  RR: 17 (26 Jan 2022 05:21) (17 - 18)  SpO2: 100% (26 Jan 2022 05:21) (96% - 100%)    INTERPRETATION OF TELEMETRY:  Sinus rhythm with HR     LABS:                        7.8    11.61 )-----------( 246      ( 25 Jan 2022 11:06 )             24.5     01-25    133<L>  |  98  |  46<H>  ----------------------------<  76  5.0   |  24  |  8.07<H>    Ca    7.4<L>      25 Jan 2022 08:08  Phos  4.8     01-25  Mg     2.20     01-25    TPro  5.2<L>  /  Alb  2.1<L>  /  TBili  0.4  /  DBili  x   /  AST  18  /  ALT  12  /  AlkPhos  65  01-25        PT/INR - ( 25 Jan 2022 01:55 )   PT: 13.0 sec;   INR: 1.14 ratio         PTT - ( 25 Jan 2022 01:55 )  PTT:31.5 sec    I&O's Summary    25 Jan 2022 07:01  -  26 Jan 2022 07:00  --------------------------------------------------------  IN: 0 mL / OUT: 500 mL / NET: -500 mL      PHYSICAL EXAM:    GENERAL: In no apparent distress, well nourished, and hydrated.  HEART: Regular rate and rhythm; No murmurs, rubs, or gallops.  PULMONARY: Clear to auscultation and percussion.  No rales, wheezing, or rhonchi bilaterally.  ABDOMEN: Soft, Nontender, Nondistended; Bowel sounds present  EXTREMITIES:  2+ Peripheral Pulses, No clubbing, cyanosis, or edema           Patient is seen and evaluated. No acute distress noted. Denies any chest pain, SOB, palpitations or dizziness    PAST MEDICAL & SURGICAL HISTORY:  Hypertension    Prostate cancer    No significant past surgical history        MEDICATIONS  (STANDING):  chlorhexidine 2% Cloths 1 Application(s) Topical daily  influenza  Vaccine (HIGH DOSE) 0.7 milliLiter(s) IntraMuscular once  lactulose Syrup 10 Gram(s) Oral two times a day  metoprolol tartrate 12.5 milliGRAM(s) Oral every 12 hours    MEDICATIONS  (PRN):  acetaminophen     Tablet .. 650 milliGRAM(s) Oral every 6 hours PRN Temp greater or equal to 38C (100.4F), Mild Pain (1 - 3), Moderate Pain (4 - 6)    Vital Signs Last 24 Hrs  T(C): 36.7 (26 Jan 2022 05:21), Max: 36.7 (26 Jan 2022 05:21)  T(F): 98 (26 Jan 2022 05:21), Max: 98 (26 Jan 2022 05:21)  HR: 67 (26 Jan 2022 05:21) (61 - 69)  BP: 122/60 (26 Jan 2022 05:21) (115/60 - 130/71)  BP(mean): --  RR: 17 (26 Jan 2022 05:21) (17 - 18)  SpO2: 100% (26 Jan 2022 05:21) (96% - 100%)    INTERPRETATION OF TELEMETRY:  Sinus rhythm with HR 60s    LABS:                        7.8    11.61 )-----------( 246      ( 25 Jan 2022 11:06 )             24.5     01-25    133<L>  |  98  |  46<H>  ----------------------------<  76  5.0   |  24  |  8.07<H>    Ca    7.4<L>      25 Jan 2022 08:08  Phos  4.8     01-25  Mg     2.20     01-25    TPro  5.2<L>  /  Alb  2.1<L>  /  TBili  0.4  /  DBili  x   /  AST  18  /  ALT  12  /  AlkPhos  65  01-25        PT/INR - ( 25 Jan 2022 01:55 )   PT: 13.0 sec;   INR: 1.14 ratio         PTT - ( 25 Jan 2022 01:55 )  PTT:31.5 sec    I&O's Summary    25 Jan 2022 07:01  -  26 Jan 2022 07:00  --------------------------------------------------------  IN: 0 mL / OUT: 500 mL / NET: -500 mL      PHYSICAL EXAM:    GENERAL: In no apparent distress, well nourished, and hydrated.  HEART: Regular rate and rhythm; No murmurs, rubs, or gallops.  PULMONARY: Clear to auscultation and percussion.  No rales, wheezing, or rhonchi bilaterally.  ABDOMEN: Soft, Nontender, Nondistended; Bowel sounds present  EXTREMITIES:  2+ Peripheral Pulses, No clubbing, cyanosis, or edema           Patient is seen and evaluated. No acute distress noted. Denies any chest pain, SOB, palpitations or dizziness. No overnight events    PAST MEDICAL & SURGICAL HISTORY:  Hypertension    Prostate cancer    No significant past surgical history        MEDICATIONS  (STANDING):  chlorhexidine 2% Cloths 1 Application(s) Topical daily  influenza  Vaccine (HIGH DOSE) 0.7 milliLiter(s) IntraMuscular once  lactulose Syrup 10 Gram(s) Oral two times a day  metoprolol tartrate 12.5 milliGRAM(s) Oral every 12 hours    MEDICATIONS  (PRN):  acetaminophen     Tablet .. 650 milliGRAM(s) Oral every 6 hours PRN Temp greater or equal to 38C (100.4F), Mild Pain (1 - 3), Moderate Pain (4 - 6)    Vital Signs Last 24 Hrs  T(C): 36.7 (26 Jan 2022 05:21), Max: 36.7 (26 Jan 2022 05:21)  T(F): 98 (26 Jan 2022 05:21), Max: 98 (26 Jan 2022 05:21)  HR: 67 (26 Jan 2022 05:21) (61 - 69)  BP: 122/60 (26 Jan 2022 05:21) (115/60 - 130/71)  BP(mean): --  RR: 17 (26 Jan 2022 05:21) (17 - 18)  SpO2: 100% (26 Jan 2022 05:21) (96% - 100%)    INTERPRETATION OF TELEMETRY:  Sinus rhythm with HR 60s    LABS:                        7.8    11.61 )-----------( 246      ( 25 Jan 2022 11:06 )             24.5     01-25    133<L>  |  98  |  46<H>  ----------------------------<  76  5.0   |  24  |  8.07<H>    Ca    7.4<L>      25 Jan 2022 08:08  Phos  4.8     01-25  Mg     2.20     01-25    TPro  5.2<L>  /  Alb  2.1<L>  /  TBili  0.4  /  DBili  x   /  AST  18  /  ALT  12  /  AlkPhos  65  01-25        PT/INR - ( 25 Jan 2022 01:55 )   PT: 13.0 sec;   INR: 1.14 ratio         PTT - ( 25 Jan 2022 01:55 )  PTT:31.5 sec    I&O's Summary    25 Jan 2022 07:01  -  26 Jan 2022 07:00  --------------------------------------------------------  IN: 0 mL / OUT: 500 mL / NET: -500 mL      PHYSICAL EXAM:    GENERAL: In no apparent distress, well nourished, and hydrated.  HEART: Regular rate and rhythm; No murmurs, rubs, or gallops.  PULMONARY: Clear to auscultation and percussion.  No rales, wheezing, or rhonchi bilaterally.  ABDOMEN: Soft, Nontender, Nondistended; Bowel sounds present  EXTREMITIES:  2+ Peripheral Pulses, No clubbing, cyanosis, or edema

## 2022-01-27 NOTE — CHART NOTE - NSCHARTNOTEFT_GEN_A_CORE
Called by bedside by nurse for asystole and pulselessness. Immediately went to assess patient.     On physical exam, no spontaneous movements were present. Patient did not respond to verbal or physical stimuli. Pupils were mid-dilated and fixed. No breath sounds were appreciated over either lung field. No carotid pulses were palpable. No heart sounds were auscultated.   Patient pronounced dead at 13:08. Attending notified.  Family was notified. Family declined autopsy.

## 2022-01-27 NOTE — PROVIDER CONTACT NOTE (OTHER) - DATE AND TIME:
21-Jan-2022 23:25
27-Jan-2022 00:12
24-Jan-2022 23:50
22-Jan-2022 17:30
25-Jan-2022 02:00
24-Jan-2022 03:00
27-Jan-2022 06:00
27-Jan-2022 06:00
27-Jan-2022 03:34
27-Jan-2022 11:37
25-Jan-2022 09:51

## 2022-01-27 NOTE — PROGRESS NOTE ADULT - PROVIDER SPECIALTY LIST ADULT
MICU
Nephrology
Urology
Electrophysiology
Internal Medicine
Internal Medicine
Urology
Nephrology
Urology
Internal Medicine
Internal Medicine
Nephrology
Internal Medicine
Nephrology
Internal Medicine

## 2022-01-27 NOTE — PROVIDER CONTACT NOTE (CRITICAL VALUE NOTIFICATION) - ACTION/TREATMENT ORDERED:
Will continue to monitor & collaborate car as per HS5
during the rapid response. patient was given 1 unit of PRBC and LR. patient passed away today at 13:08
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 will continue to monitor
HS5 notified
administer humalin and dextrose

## 2022-01-27 NOTE — PROGRESS NOTE ADULT - ATTENDING COMMENTS
I reached out to Dr. Margarito Mcfadden from interventional radiology this morning by email to clarify the consult to IR for nephrostomy tube placement under local anesthesia. The patient has not tolerated HD due to hemodynamic instability with 2 rapid responses called. I do not think he will tolerate general anesthesia required for stent insertion, especially in the setting of prior bladder ca and tumor resection done recently at outside hospital.   Awaiting response from Dr. Mcfadden.
agree with above.  Patient not good candidate for general anesthesia for stent placement given hemodynamic instability repetitively displayed with HD.  IR does not wish to attempt PCN under local and patient will not agree to PCN  Consider CVVHD.
Agree with above. Seen and examined with team on rounds. Critically ill with hyperkalemia needed urgent HD. Improved electrolytes post HD. Close follow up by team. Stable for transfer to telemetry.
Mr. Aguilar is a 89 year old M with PMH of HTN, prostate cancer s/p XRT, and previous cystoscopy with bladder tumor removal on 10/28/2021 at [facility] presented with 2 week history of hematuria, malaise, and lethargy. He was found to be in acute renal failure 2/2 heavy clot burden with persistent hyponatremia and hyperkalemia requiring initiation of urgent dialysis via R IJ shiley. EP was called to evaluate as patient had two episodes SVT with HR up to 160s associated with hypotension. He received one dose of Amiodarone 150 mg IV. Currently he is in sinus rhythm with HR 60s-70s. Karine Solis, follow on tele.
KOLBY  Hyperkalemia  Obstructive Uropathy    Follow up with  and IR regarding relieving obstruction, no plan for HD today as he received it late last night.   Will monitor for further HD needs.
KOLBY  Hyperkalemia  Acute blood loss anemia  Obstructive uropathy    Will do HD again today if he is able to tolerate it as K and Cr still rising. Cysto planned by urology- agreed would be best option to improve renal function. Cont to monitor labs and Is/Os.
Pt. with KOLBY and hyperkalemia in setting of obstructive uropathy. Pt. received ~60 minutes of HD treatment yesterday. Scr elevated at 8.59 today. Serum potassium WNL on AM labs. Assessment and plan as outlined above. Assess need for HD daily. Monitor labs and urine output. Avoid any potential nephrotoxins. Dose medications as per eGFR.
KOLBY   Hyperkalemia  Obstructive uropathy  Gross Hematuria    Patient now in ICU setting as he had refractory hyperkalemia requiring HD last night. Will plan for HD today as K still remains high. Will monitor daily for dialysis needs.   Follow up with  regarding hematuria and obstruction.
Pt. with KOLBY and hyperkalemia in setting of obstructive uropathy. Scr remains elevated at 8.09 today. Pt. with mild hyperkalemia on AM labs. Assessment and plan as outlined above. Plan for HD today. Monitor labs and urine output. Avoid any potential nephrotoxins. Dose medications as per eGFR.
89 yo M h/o HTN, prostate CA s/p XRT, cystoscopy with bladder tumor removal on 10/28/2021 presenting with hematuria and KOLBY 2/2 obstructive uropathy c/b tachycardia   RRT today for hypotension. Hgb of 6.8. Suspect hypovolemic shock 2/2 to chronic blood loss. Transfused 1 unit with some improvement in blood pressure. Pt lethargic this morning likely metabolic encephalopathy given multiorgan failure   KOLBY- secondary to obstruction uropathy, likely secondary to clot burden, clots removed by urology. Started on HD, renal following. Has not be able to tolerate full HD session recently due to tachycardia and hypotension. Incomplete HD yesterday, BMP with mild hyperk and metabolic acidosis.  SVT: appreciate EP recs. Started on metoprolol but only able to get a few doses 2/2 hypotension. Monitor on tele  s/p catheter placement and manual irrigation with removal of ~200cc of clot, and started on CBI. CBI d/c as easily clears with manual irrigation. Continue with manual irrigation qd. Urology following  Anemia- likely secondary to hematuria, trend CBC and transfuse prn Hb<7 .  GOC discussion: pt continues to decompensate, not tolerating HD session and refusing PCT. Too unstable to undergo anesthesia for ureteral stents. Will discuss with family re transition to full comfort care.
89 yo M h/o HTN, prostate CA s/p XRT, cystoscopy with bladder tumor removal on 10/28/2021 presenting with hematuria and KOLBY.  KOLBY- secondary to obstruction uropathy, likely secondary to clot burden, clots removed by urology. Started on HD, renal following. Was not able to tolerate full HD session on Saturday due to tachycardia. Today noted to be slightly hyperkalemic. Will treat with lokelma x1. Plan for HD today. If needs continued HD after d/c will need permacath placement, discussed with pt-deferring discussion, would like us to reach out to family   Hematuria-   Improved s/p catheter placement and manual irrigation with removal of ~200cc of clot, and started on CBI. CBI d/c as easily clears with manual irrigation. Continue with manual irrigation qd.  f/u urology recs   Anemia- likely secondary to hematuria, s/p 2u PRBCs, trend CBC and transfuse prn Hb<7 .
89 yo M h/o HTN, prostate CA s/p XRT, cystoscopy with bladder tumor removal on 10/28/2021 presenting with hematuria and KOLBY 2/2 obstructive uropathy c/b tachycardia   KOLBY- secondary to obstruction uropathy, likely secondary to clot burden, clots removed by urology. Started on HD, renal following. Has not be able to tolerate full HD session recently due to tachycardia. Yesterday had an RRT 2/2 tachycardia and hypotension. On EKG, appears to be in SVT. Treated with IVF and amiodarone with improvement in HR and blood pressure. Unclear what is driving SVT. Not febrile, CXR with new opacities but may be related to COVID infx. There was some concern that shiley needed to be pulled back but as per IR catheter tip is not in heart and should not predispose to tachycardia during dialysis. Appreciate EP consult, started on metoprolol. Plan for cytoscopy and ureteral stent placement by urology however given instability, deemed too high risk. F/u with IR regarding PCNT.   s/p catheter placement and manual irrigation with removal of ~200cc of clot, and started on CBI. CBI d/c as easily clears with manual irrigation. Continue with manual irrigation qd.  f/u urology recs   F/u labs, likely plan for HD today  Anemia- likely secondary to hematuria, s/p 2u PRBCs, trend CBC and transfuse prn Hb<7 .  GOC discussion: next of kin Cruz (son) called to provide updates. He would like us to try measures that would cause minimal discomfort for the patient first but if unsuccessful, would like escalation of care (ie IV pressors if needed)
89 yo M h/o HTN, prostate CA s/p XRT, cystoscopy with bladder tumor removal on 10/28/2021 presenting with hematuria and KOLBY.  KOLBY- secondary to obstruction uropathy, likely secondary to clot burden, clots removed by urology. Started on HD, renal following   Hematuria-   Improved s/p catheter placement and manual irrigation with removal of ~200cc of clot, and started on CBI. f/u urology recs   Anemia0- likely secondary to hematuria, s/p 2u PRBCs, trend CBC and transfuse prn Hb<7
89 yo M h/o HTN, prostate CA s/p XRT, cystoscopy with bladder tumor removal on 10/28/2021 presenting with hematuria and KOLBY.  KOLBY- secondary to obstruction uropathy, likely secondary to clot burden, clots removed by urology. Started on HD, renal following. If needs continued HD after d/c will need permacath placement  Hematuria-   Improved s/p catheter placement and manual irrigation with removal of ~200cc of clot, and started on CBI. f/u urology recs   Anemia- likely secondary to hematuria, s/p 2u PRBCs, trend CBC and transfuse prn Hb<7
89 yo M h/o HTN, prostate CA s/p XRT, cystoscopy with bladder tumor removal on 10/28/2021 presenting with hematuria and KOLBY 2/2 obstructive uropathy c/b tachycardia   KOLBY- secondary to obstruction uropathy, likely secondary to clot burden, clots removed by urology. Started on HD, renal following. Was not able to tolerate full HD session on Saturday or yesterday due to tachycardia. Today s/p RRT 2/2 tachycardia and hypotension. On EKG, appears to be in SVT. Treated with IVF and amiodarone with improvement in HR and blood pressure. Unclear what is driving SVT. EP consult pending. Not febrile, CXR with new opacities but may be related to COVID infx. There was some concern that shiley needed to be pulled back but as per IR catheter tip is not in heart and should not predispose to tachycardia during dialysis. Plan for cytoscopy and ureteral stent placement by urology however given instability, procedure canceled. Rec b/l PCN. If pt continues to not tolerate HD, will need further GOC discussion.  Hematuria-   Improved s/p catheter placement and manual irrigation with removal of ~200cc of clot, and started on CBI. CBI d/c as easily clears with manual irrigation. Continue with manual irrigation qd.  f/u urology recs   Anemia- likely secondary to hematuria, s/p 2u PRBCs, trend CBC and transfuse prn Hb<7 .  GOC discussion: next of kin Cruz (son) called to provide updates. He would like us to try measures that would cause minimal discomfort for the patient first but if unsuccessful, would like escalation of care (ie IV pressors if needed)

## 2022-01-27 NOTE — PROVIDER CONTACT NOTE (OTHER) - BACKGROUND
unable to get AM labs due to severe edema
89 year old male with PMH of HTN, prostate cancer, bladder tumor removal on 10/28/2021, presented with hematuria, lethargy and hyperkalemia, +covid-19.
89 year ole male with PMH of HTN, prostate cancer s/p XRT admitted for hyperkalemia.
patient admitted for hypotension
89 year old male with PMH of HTN, prostate cancer, bladder tumor removal on 10/28/2021, presented with hematuria, lethargy and hyperkalemia, +covid-19.
89 year ole male with PMH of HTN, prostate cancer s/p XRT admitted for hyperkalemia.
89 year ole male with PMH of HTN, prostate cancer s/p XRT admitted for hyperkalemia.
PMH of prostate cancer HTN, renal failure, anemia. status post XRT
Pt is alert, oriented, no verbal complaint, VS 76/56, 
89 year ole male with PMH of HTN, prostate cancer s/p XRT admitted for hyperkalemia.
PMH of prostate cancer HTN, renal failure, anemia. status post XRT
Patient 89YOM with PMH of HTN, prostate cancer and ESRD

## 2022-01-27 NOTE — PROGRESS NOTE ADULT - PROBLEM SELECTOR PROBLEM 2
Renal failure
Hyperkalemia
Renal failure

## 2022-01-27 NOTE — PROVIDER CONTACT NOTE (CRITICAL VALUE NOTIFICATION) - ASSESSMENT
blood gas lactate 4.5 and potassium 6.9
Hgb 6.9 hct 21 on VBG
pateint is A&O1. patient is lethargic. patients BP in the flowsheet. patient had a rapid called on today.
patient is A&O4. patients potassium was 6.5 this morning. patient was a little letharigic. VS in the flowsheet. patient was given LR over 60 minutes.

## 2022-01-27 NOTE — PROGRESS NOTE ADULT - PROBLEM SELECTOR PLAN 2
2/2 obstructive uropathy. no previous history of renal failure. not tolerating HD well, nephro assessing on a daily basis  - creatinine 9.87 on admission. HD via R IJ shiley initiated on 1/20 with a couple of sessions terminated early due to tachycardia to 160  - IR was consulted for shiley adjustment- do not believe shiley is related to tachycardia as catheter tip not in heart  - HD 1/24, received ~2 hrs, terminated early for tachycardia to 160 w/ hypotension - had RRT called     - f/u nephro re: future HD plans going forward

## 2022-01-27 NOTE — PROVIDER CONTACT NOTE (OTHER) - REASON
Leaking out of urethral catheter/hypotension Blood work could not be completed after multiple attempts.

## 2022-01-27 NOTE — PROVIDER CONTACT NOTE (CRITICAL VALUE NOTIFICATION) - SITUATION
Pt potassium 6.8
patients Hemoglobin 6.7 and hematocrit 22.4
blood gas lactate 4.5 and potassium 6.9
Patietns potassium 6.5

## 2022-01-27 NOTE — PROGRESS NOTE ADULT - ATTENDING SUPERVISION STATEMENT
Resident
Resident
ACP
Resident
Fellow
Resident

## 2022-01-27 NOTE — PROGRESS NOTE ADULT - ASSESSMENT
89M Hx HTN, prostate cancer s/p XRT, and previous cystoscopy with bladder tumor removal on 10/28/2021 at [facility] presents with 2 week history of hematuria, malaise, and lethargy found to be in acute renal failure 2/2 heavy clot burden with persistent hyponatremia and hyperkalemia requiring initiation of urgent dialysis via R IJ shiley. Course c/b inability to tolerate HD and ?SVTs a/w hypotension with 2 RRTs.  89M Hx HTN, prostate cancer s/p XRT, and previous cystoscopy with bladder tumor removal on 10/2021 presents with 2 week history of hematuria, malaise, and lethargy found to be in acute renal failure 2/2 obstructive uropathy w/ heavy clot burden requiring initiation of urgent dialysis via R IJ shiley. Course c/b persistent inability to tolerate HD and new onset SVTs a/w hypotension with 2 RRTs. Had a 3rd RRT called this AM for hypotension likely 2/2 anemia requiring another pRBC. Prognosis poor at this time, family to come visit today for further GOC.

## 2022-01-27 NOTE — PROGRESS NOTE ADULT - PROBLEM SELECTOR PLAN 6
Diet: Regular  DVT: Holding i/s/o bleeding   Dispo: Pending hospital course, would need SILVIA  Code: DNR/DNI  Communication: SonArya 1/26 over phone Diet: Renal  DVT: Holding i/s/o bleeding   Dispo: Pending hospital course, would need SILVIA  Code: DNR/DNI  Communication: SonArya 1/26 over phone

## 2022-01-27 NOTE — PROGRESS NOTE ADULT - SUBJECTIVE AND OBJECTIVE BOX
Subjective  No complaints. Urine draining clear light pink with small dark clots/debris. H/H has been stable for past few days, unable to obtain CBC this AM.    Objective    Vital signs  T(F): , Max: 98.8 (01-27-22 @ 06:07)  HR: 83 (01-27-22 @ 06:07)  BP: 92/44 (01-27-22 @ 06:07)  SpO2: 95% (01-27-22 @ 06:07)  Wt(kg): --    Output     OUT:    Indwelling Catheter - Urethral (mL): 400 mL  Total OUT: 400 mL    Total NET: -400 mL          Gen: NAD  Abd: soft, nontender, nondistended  : mendez secured in place, draining clear light pink with small dark clots/debris (urine in bag is dark red, likely from lysis of clot from urease enzyme in urine)    Labs      01-27 @ 06:54    WBC --    / Hct --    / SCr 8.14     01-25 @ 11:06    WBC 11.61 / Hct 24.5  / SCr --           Culture - Blood (collected 01-22-22 @ 05:25)  Source: .Blood Blood-Venous  Final Report (01-27-22 @ 09:00):    No Growth Final    Culture - Blood (collected 01-22-22 @ 05:15)  Source: .Blood Blood-Peripheral  Final Report (01-27-22 @ 09:00):    No Growth Final    Culture - Nose (collected 01-21-22 @ 21:34)  Source: .Nose  Final Report (01-24-22 @ 09:46):    No staphylococcus aureus isolated.    "PCR is more Sensitive for identifying MRSA/MSSA."

## 2022-01-27 NOTE — PROVIDER CONTACT NOTE (CRITICAL VALUE NOTIFICATION) - TEST AND RESULT REPORTED:
Hgb 6.9 hct 21
Patietns potassium 6.5
patients Hemoglobin 6.7 and hematocrit 22.4
potassium: 6.2, hemoglobin: 6.5, Hematocrit: 20
Potassium: 6.3
blood gas lactate 4.5 and potassium 6.9
Potassium (non hemolyzed) 7.2  BUN 93  Cre 9.9
Hemoglobin 6.5/Hematocrit 21.1
Troponin 65
Potassium 6.8

## 2022-01-27 NOTE — PROGRESS NOTE ADULT - PROBLEM SELECTOR PLAN 1
had ~3 episodes of tachycardia to 160s initially only on HD with 1 ep a/w hypotension. 1/25 had SVTs to 160s a/w hypotension off HD for first time, given amiodarone 150mg IV.   - no prior known cardiac h/o. typically HR 70s-80s on tele w/ possible 1st degree AV block (mildly prolonged IN intervals)  - EP consulted, appreciate recs    - will start metoprolol 12.5mg bid   - replete K >4 and Mg >2  - TTE pending had ~3 episodes of tachycardia to 160s initially only on HD with 1 ep a/w hypotension. 1/25 had SVTs to 160s a/w hypotension off HD for first time, given amiodarone 150mg IV.   - no prior known cardiac h/o. typically HR 70s-80s on tele w/ possible 1st degree AV block (mildly prolonged FL intervals)  - EP consulted, appreciate recs  - TTE (1/26) EF 69%, wnl     - c/w metoprolol 12.5mg bid, BPs still too soft to uptitrate at this time   - replete K >4 and Mg >2

## 2022-01-27 NOTE — RAPID RESPONSE TEAM SUMMARY - NSSITUATIONBACKGROUNDRRT_GEN_ALL_CORE
89 year old male with a medical history significant for HTN, prostate ca, s/p XRT, and recent cystoscopy 10/28/21 for a bladder tumor, presenting to the ED with hematuria, malaise, lethargy and altered mental status x 2 weeks, admitted for acute renal failure iso obstructive uropathy. Course c/b anemia likely 2/2 gross hematuria. S/P CBI, continued on dialysis with progressive clinical decline and hemodynamic instability.    RRT called today for hypotension to the 60s. Upon arrival, pt appeared lethargic but responsive to pain. SBP 60s,  (sinus tach), RR 24. CBC w/ Hgb 6.7. 1 unit PRBC started. Son /HCP (Cruz Aguilar) called at RRT to discuss poor prognosis and low utility of pressor support at this time given clinical status. Son in agreement to hold off on pressor support. Requesting to visit patient with sister (Melvi Baron). Floor made aware to help arrange visit. Family requesting patient be kept comfortable and receive all non-invasive means of life-sustaining measures while visit is being arranged.     Plan  -F/u CMP/ABG/Post transfusion CBC  -PRN Dilaudid for pain/work of breathing  -Floor to arrange visit w/ family

## 2022-01-27 NOTE — PROVIDER CONTACT NOTE (OTHER) - ASSESSMENT
Pt is alert, oriented, no verbal complaint, VS 76/56, 
vitals signs otherwise stable. Patient denies any discomfort or pain. no acute distress noted. Patient was able to only tolerate about 15mins of hemodialysis treatment yesterday. today hemodialysis was stopped 45 mins into the session. HR down to 90s Sinus Rhythm.
patient was A&O1. RRT called for hypotension, checked BP several times. BP in the RRT flowsheet. patient got 1 L of LR and blood during the RRT
Called provider to ask if patient needed continuously bladder irrigation or as needed. Patient has an order for continuous bladder irrigation and as needed irrigation. Ask provider to clarify order and if intermittent irrigation is needed, please enter order. Inform provider that patient urine is not clear and red color.
Patient current indwelling cathter has been inserted since 1/20
Patient has generalized edema +2 on both extremities.
Patient AO x 3 disoriented to only time. Patient indwelling catheter was leaking blood tinged fluid. Urine upon assessment was blood tinged. BP 92/44 83 bpm 98.8 18 breaths/min 95 spo2. Urinary output from indwelling catheter 405 ml.
Patient denies any pain, chest pain or sob
Unable to draw AM Labs
patient asymptomatic. 100/52 89 bpm 98.3 18 breaths/min 95 spO2.
Patient AOx4  /83
Patient lying in bed, A&O x4, denies chest pain, SOB, lightheaded or blurry vision. VS per flow sheet.

## 2022-01-27 NOTE — PROGRESS NOTE ADULT - PROBLEM SELECTOR PLAN 3
previous cystoscopy with bladder tumor removal on 10/28/2021 at [facility]. Per daughter, biopsy results were negative, have not been able to obtain records   - pt. found to have large clot in bladder causing distention. Urology following, s/p CBI, stopped 1/22  - mendez placed by urology- does not recommend TOV this admission  - urology originally planned for cystoscopy/ureteral stents on 1/25 but pt became HD unstable 2/2 SVT with RRT on 1/25 AM. anesthesia now more risky, recommending IR for PCN under local anesthesia  - repeat renal US (1/26) w/o significant change from prior   - IR was consulted- procedure may also require general anesthesia on their end and pt is also declining PCN placement     - c/w intermittent mendez irrigation at least once per shift and PRN until urine clears  - f/u IR and uro discussion  - continue [facility] convo

## 2022-01-27 NOTE — PROGRESS NOTE ADULT - ASSESSMENT
89 year old male with a medical history significant for HTN, prostate ca, s/p XRT, and recent cystoscopy 10/28/21 for a bladder tumor, presenting to the ED with hematuria, malaise, lethargy and altered mental status x 2 weeks, in renal failure with bilateral hydro, obstructive uropathy, gross hematuria, catheter placement and manual irrigation with removal of ~200cc of clot, and started on CBI, anemic, receiving transfusion.     -Gross hematuria improved, now off CBI since 1/22/22 --> no need for CBI at this time as color clears easily with minimal manual irrigation  -Recommend manual irrigation at least q shift and prn for dark/opaque color or clots and if leakage occurs surrounding catheter -- monitor urine color, manually hand irrigate mendez PRN (see above) to clear urine --  use pierre (piston syringe) and inject 60 cc of sterile water into the mendez catheter through the drainage tube (do not irrigate via the side port) and draw back for irrigation, repeat until urine clears  -Continue mendez; would not recommend TOV on this admission, patient can follow up as outpatient for TOV and evaluation or urinary retention  -Document UOP in flowsheets at least q4h  -Monitor Cr and electrolytes  -Consider CVVH if patient is unable to tolerate HD?  -IR deferred PCN (patient refusing -- see IR chart note 1/26/22) and patient is not candidate for general anesthesia (see uro attending attestation 1/26/22)  -No further urologic investigations or interventions warranted at this time, please do not hesitate to contact urology should any additional urologic concerns or questions arise

## 2022-01-27 NOTE — CHART NOTE - NSCHARTNOTESELECT_GEN_ALL_CORE
Death Note/Event Note
Hematuria/Event Note
MAR Accept/Event Note
MICU Accept Note
POCUS
Urology OR Cancelled/Event Note
IR Procedure Note/Event Note
IR/Event Note
IR/Event Note
Nephrology/Event Note
Transfer Note
Urology - OR planning/Event Note
Urology - US reviewed/Event Note

## 2022-01-27 NOTE — PROGRESS NOTE ADULT - SUBJECTIVE AND OBJECTIVE BOX
Progress Note    01-20-22 (7d)  -----------------------------------------------------------------------------------------------------------  GARETH Reynaga PGY1  Pager# [phone]  After 7pm, please page night float pager #[phone]  ------------------------------------------------------------------------------------------------------------    Patient is a 89y old  Male who presents with a chief complaint of Hematuria, AMS (26 Jan 2022 13:14)      Subjective / Overnight Events :  - Pt went for HD overnight, only tolerated 1 hr before becoming hypotensive to 50s systolic, had 200cc put in. BP normalized afterwards without intervention   - AM CBC was unable to be drawn due to b/l UE edema from IVF infiltration during RRT 2 days ago.   - Pt had refused PCN placement by IR yesterday. Ongoing [facility] conversation with pt as he is AAOx4 and has full capacity to make his own decisions. Children, Arya and Melvi, are also involved, appear to respect his decisions     Additional ROS (if any):    MEDICATIONS  (STANDING):  chlorhexidine 2% Cloths 1 Application(s) Topical daily  influenza  Vaccine (HIGH DOSE) 0.7 milliLiter(s) IntraMuscular once  lactulose Syrup 10 Gram(s) Oral two times a day  metoprolol tartrate 12.5 milliGRAM(s) Oral every 12 hours    MEDICATIONS  (PRN):  acetaminophen     Tablet .. 650 milliGRAM(s) Oral every 6 hours PRN Temp greater or equal to 38C (100.4F), Mild Pain (1 - 3), Moderate Pain (4 - 6)      CAPILLARY BLOOD GLUCOSE        I&O's Summary    26 Jan 2022 07:01  -  27 Jan 2022 07:00  --------------------------------------------------------  IN: 1105 mL / OUT: 400 mL / NET: 705 mL        PHYSICAL EXAM:  Vital Signs Last 24 Hrs  T(C): 37.1 (27 Jan 2022 06:07), Max: 37.1 (27 Jan 2022 06:07)  T(F): 98.8 (27 Jan 2022 06:07), Max: 98.8 (27 Jan 2022 06:07)  HR: 83 (27 Jan 2022 06:07) (67 - 89)  BP: 92/44 (27 Jan 2022 06:07) (92/44 - 128/68)  BP(mean): --  RR: 18 (27 Jan 2022 06:07) (17 - 18)  SpO2: 95% (27 Jan 2022 06:07) (95% - 100%)    General: NAD, non-toxic appearing   HEENT: EOMi, no scleral icterus  CV: RRR, normal S1 and S2, no m/r/g  Lungs: normal respiratory effort. CTAB, no wheezes, rales, or rhonchi  Abd: soft, nontender, nondistended  Ext: b/l UE edema, no LE edema, 2+ peripheral pulses   Pysch: AAOx3, appropriate affect   Neuro: grossly non-focal, moving all extremities spontaneously   Skin: no rashes or lesions     LABS:                          7.8    11.61 )-----------( 246      ( 25 Jan 2022 11:06 )             24.5       WBC Trend: 11.61<--, 10.42<--, 10.44<--  Hb Trend: 7.8<--, 7.5<--, 7.1<--, 7.3<--, 7.2<--    01-25    133<L>  |  98  |  46<H>  ----------------------------<  76  5.0   |  24  |  8.07<H>    Ca    7.4<L>      25 Jan 2022 08:08  Phos  4.8     01-25  Mg     2.20     01-25                  RADIOLOGY & ADDITIONAL TESTS: Reviewed Progress Note    01-20-22 (7d)  -----------------------------------------------------------------------------------------------------------  GARETH Reynaga PGY1  Pager# [phone]  After 7pm, please page night float pager #[phone]  ------------------------------------------------------------------------------------------------------------    Patient is a 89y old  Male who presents with a chief complaint of Hematuria, AMS (26 Jan 2022 13:14)      Subjective / Overnight Events :  - Pt went for HD overnight ~11pm, only tolerated 1 hr before becoming hypotensive to 50s systolic, had 200cc put in. BP normalized afterwards without intervention   - AM CBC was unable to be drawn due to b/l UE edema from IVF infiltration during RRT 2 days ago. RN to keep trying.  - Pt was examined at bedside this AM and was found to be more lethargic and confused, last BP was soft at 92/44. Repeat BP around the same and pt given 1L LR bolus. FS was 74.     INTERVAL EVENT  - An hr later, RRT was called for hypotension w/ SBP in 60s. CBC successfully obtained earlier had returned hgb 6.7. Pt ordered for 1u pRBC. See RRT note for more details.     Additional ROS (if any):    MEDICATIONS  (STANDING):  chlorhexidine 2% Cloths 1 Application(s) Topical daily  influenza  Vaccine (HIGH DOSE) 0.7 milliLiter(s) IntraMuscular once  lactulose Syrup 10 Gram(s) Oral two times a day  metoprolol tartrate 12.5 milliGRAM(s) Oral every 12 hours    MEDICATIONS  (PRN):  acetaminophen     Tablet .. 650 milliGRAM(s) Oral every 6 hours PRN Temp greater or equal to 38C (100.4F), Mild Pain (1 - 3), Moderate Pain (4 - 6)      CAPILLARY BLOOD GLUCOSE        I&O's Summary    26 Jan 2022 07:01  -  27 Jan 2022 07:00  --------------------------------------------------------  IN: 1105 mL / OUT: 400 mL / NET: 705 mL        PHYSICAL EXAM:  Vital Signs Last 24 Hrs  T(C): 37.1 (27 Jan 2022 06:07), Max: 37.1 (27 Jan 2022 06:07)  T(F): 98.8 (27 Jan 2022 06:07), Max: 98.8 (27 Jan 2022 06:07)  HR: 83 (27 Jan 2022 06:07) (67 - 89)  BP: 92/44 (27 Jan 2022 06:07) (92/44 - 128/68)  BP(mean): --  RR: 18 (27 Jan 2022 06:07) (17 - 18)  SpO2: 95% (27 Jan 2022 06:07) (95% - 100%)    General: NAD, lethargic appearing  HEENT: EOMi, no scleral icterus  CV: RRR, normal S1 and S2, no m/r/g  Lungs: normal respiratory effort. CTAB, no wheezes, rales, or rhonchi  Abd: soft, nontender, nondistended  Ext: b/l UE edema, no LE edema, 2+ peripheral pulses   Pysch: AAOx1  Neuro: grossly non-focal, moving all extremities spontaneously but not consistently following commands  Skin: no rashes or lesions     LABS:                          7.8    11.61 )-----------( 246      ( 25 Jan 2022 11:06 )             24.5       WBC Trend: 11.61<--, 10.42<--, 10.44<--  Hb Trend: 7.8<--, 7.5<--, 7.1<--, 7.3<--, 7.2<--    01-25    133<L>  |  98  |  46<H>  ----------------------------<  76  5.0   |  24  |  8.07<H>    Ca    7.4<L>      25 Jan 2022 08:08  Phos  4.8     01-25  Mg     2.20     01-25                  RADIOLOGY & ADDITIONAL TESTS: Reviewed

## 2022-01-27 NOTE — PROVIDER CONTACT NOTE (CRITICAL VALUE NOTIFICATION) - NAME OF MD/NP/PA/DO NOTIFIED:
Jesus
Chino Fitzpatrick
Dr. Amanda Lee
Justin Caro, PAC
Dr. Child
HS4
Jesus, A
javier Lopes
Hs5 Chente Saleh
Nanette Bales

## 2022-01-27 NOTE — PROVIDER CONTACT NOTE (OTHER) - RECOMMENDATIONS
Provider made aware
Provider made aware
Provider made aware. Assessment of urethral catheter balloon
notify provider
Provider made aware, BP re-assessed manually
provider called/ RRT called
continue to monitor.
MD ordered to end treatment for now.
Provider made aware
Provider made aware
Tx terminated 1 hr early, Primary team made aware.
notify provider

## 2022-01-27 NOTE — PROVIDER CONTACT NOTE (OTHER) - SITUATION
patient -138
ST to 167 on tele during hemodialysis.
Patient noted tachycardic on telemetry monitoring, HR sustaining 140-150s. BP is also low with SBP within the 60-70s
Patient receiving dialysis  with systolic heart rate >150 and sustained.
Patient was hypotensive upon assessment
unable to get AM labs please reorder
RRT called for hypotension
After multiple attempts, and the CBC could not be retrieved only the BMP.
Called provider to ask if patient needed continuously bladder irrigation or as needed. Patient has an order for continuous bladder irrigation and as needed irrigation. Ask provider to clarify order
Patient was hypotensive upon assessment
blood tinged fluid appeared to be leaking out of patient's penis where urethral catheter is inserted. Patient was also hypotensive upon assessment by asymptomatic.

## 2022-01-27 NOTE — PROVIDER CONTACT NOTE (OTHER) - ACTION/TREATMENT ORDERED:
Ordered IVF lactated ringer bolus 1L
Provider made aware
IV tylenol 1000mg once
Provider made aware. Continue to monitor leakage. Provider ordered consult from urology.
Tx terminated 1 hr early, Primary team made aware. Rapid response alerted due consistent HR of 150's  Pt returned to unit with BP of 111/69 hR 115/130.
Patient treatment discontinued as ordered.
notify provider
Provider made aware
Provider made aware. Provider suggested not performing bladder irrigation because she will clarify with urology in day.
Provider made aware
RRT called.
stat BMP, continue to monitor

## 2022-01-27 NOTE — PROGRESS NOTE ADULT - REASON FOR ADMISSION
Hematuria, AMS

## 2022-01-27 NOTE — PROVIDER CONTACT NOTE (OTHER) - NAME OF MD/NP/PA/DO NOTIFIED:
Raeann Martinez
Dr. Blackwell
Nanette Magana
Raeann Martinez
Dr. Chino Fitzpatrick HS5
Team 5 Balaji Lopes
Dr. Blackwell
Raeann Martinez

## 2022-01-27 NOTE — DISCHARGE NOTE FOR THE EXPIRED PATIENT - HOSPITAL COURSE
89M Hx HTN, prostate cancer s/p XRT, and previous cystoscopy with bladder tumor removal on 10/2021 presents with 2 week history of hematuria, malaise, and lethargy found to be in acute renal failure 2/2 obstructive uropathy w/ heavy clot burden requiring initiation of urgent dialysis via R IJ shiley. Course c/b persistent inability to tolerate HD and new onset SVTs a/w hypotension with 2 RRTs. Had a 3rd RRT called this AM for hypotension likely 2/2 anemia requiring another pRBC. P   89M Hx HTN, prostate cancer s/p XRT, and previous cystoscopy with bladder tumor removal on 10/2021 presented with 2 week history of hematuria, malaise, and lethargy, was found to be in acute renal failure with hyperkalemia secondary to obstructive nephropathy likely due to heavy clot burden within bladder. He required a short MICU stay for initiation of urgent dialysis via a shiley placed in his right IJ. He was transferred back to medicine floors for continuation of HD, which was complicated by persistent inability to tolerate HD, sessions always terminated early secondary to tachycardia to 160s, once with associated hypotension. Urology was following him for the obstructive uropathy, patient had a mendez inserted and was placed on CBI shortly for clearance of his clots. He required 2 units of pRBC for hgb <7 secondary to his ongoing hematuria. He was initially planned for cystoscopy and ureteral stent placements by urology to relieve his obstruction, but patient had 2 RRTs called for SVTs to 160s with associated hypotension, and was deemed too risky for general anesthesia given his hemodynamic instability. IR was also consulted for possible PCN placement but patient refused. Morning of 1/27, patient became altered and hypotensive, found to have anemia with hgb of 6.7, another RRT was called, and BP initially stabilized after IVF's and 1 unit of pRBC were given. Shortly after, patient was found with asystole on telemetry monitor and pronounced dead at bedside.

## 2024-08-09 NOTE — ED ADULT NURSE NOTE - CCCP TRG CHIEF CMPLNT
Detail Level: Detailed
Size Of Lesion: 0.55 x 0.54 cm
bloody stools
Size Of Lesion: 0.65 x 0.5 cm
Size Of Lesion: 0.2 x 0.15 cm
Size Of Lesion: 0.35x0.3cm

## 2024-12-16 NOTE — ED ADULT NURSE NOTE - FINAL NURSING ELECTRONIC SIGNATURE
PHYSICAL EXAM: I have reviewed current vital signs.  GENERAL: NAD, well-nourished; well-developed.  HEAD:  Normocephalic, atraumatic.  EYES: Conjunctiva and sclera clear.  ENT: MMM, no erythema/exudates.  CHEST/LUNG: Clear to auscultation bilaterally; no wheezes, rales, or rhonchi.  HEART: Regular rate and rhythm, normal S1 and S2; no murmurs, rubs, or gallops.  ABDOMEN: Trace left lower quadrant tenderness to deep palpation.  Soft, nondistended.  EXTREMITIES:  2+ peripheral pulses; no clubbing, cyanosis, or edema.  PSYCH: Cooperative, appropriate, normal mood and affect.  NEUROLOGY: A&O x 3.  No focal neurological deficits.  SKIN: Warm and dry.
09-Sep-2019 16:46

## 2024-12-26 NOTE — PROGRESS NOTE ADULT - PROBLEM SELECTOR PROBLEM 1
without device  Has the patient had two or more falls in the past year or any fall with injury in the past year?: Yes    Active : No  Patient's  Info: B-I-L or oldest son's GF     Additional Comments: Patient states he uses a cane at home.    SUBJECTIVE: cooperative    PAIN: 0/10: no c/o pain during session    Vitals: Blood Pressure: 142/99  Oxygen: 99% on RA  Heart Rate: 78    COGNITION: Slow Processing, Decreased Problem Solving, and Decreased Safety Awareness    ADL:   Footwear Management: Dependent.  For adjusting slipper sock .  **Pt declined further ADLs at this time reports BADL completed earlier this am    IADL:   Not Tested    BALANCE:  Sitting Balance:  Stand By Assistance.    Standing Balance: Contact Guard Assistance.      BED MOBILITY:  Supine to Sit: Minimal Assistance      TRANSFERS:  Sit to Stand:  Contact Guard Assistance.   Stand to Sit: Contact Guard Assistance.      FUNCTIONAL MOBILITY:  Assistive Device: Rolling Walker  Assist Level:  Minimal Assistance-CGA  Distance: To and from bathroom + 40ft in hallway  Noted tremor, vcs for posture      ADDITIONAL ACTIVITIES:  Stood 4 min at sink with CGA during B scapula elevation, retraction, posterior shoulder circles x 10 reps. Completed to increase standing balance & endurance for sinkside grooming  AM-[facility] Inpatient Daily Activity Raw Score: 17  AM-[facility] Inpatient ADL T-Scale Score : 37.26  ADL Inpatient CMS 0-100% Score: 50.11    Modified [place] Scale:  Not Applicable    ASSESSMENT:     Activity Tolerance:  Patient tolerance of  treatment: Fair treatment tolerance       Plan: Times Per Week: 5x  Current Treatment Recommendations: Strengthening, Balance training, Functional mobility training, Endurance training, Safety education & training, Patient/Caregiver education & training, Cognitive reorientation, Equipment evaluation, education, & procurement, Self-Care / ADL    Education:  Learners: Patient  See above    Goals  Short Term Goals  Time  Frame for Short Term Goals: until discharge  Short Term Goal 1: Patient will safely complete various functional transfers with SBA to improve safety in living environment.  Short Term Goal 2: Patient will improve functional ambulation to/from bathroom with CGA and LRAD in prep for return to PLOF.  Short Term Goal 3: Patient will complete UB/LB dressing/bathing with CGA and use of LHAE prn and energy conservation techniques to improve ease in BADL routine.  Short Term Goal 4: Patient will tolerate standing 4-6 minutes with unilateral UE release reaching ouside base of support with SBA to complete sink side grooming.  Long Term Goals  Time Frame for Long Term Goals : None due to ELOS    Following session, patient left in safe position with all fall risk precautions in place.        Renal failure

## 2025-01-14 NOTE — CONSULT NOTE ADULT - ASSESSMENT
tried physical therapy was referred to Dr. Rosenthal TMJ specialist at .  Patient requested the referral to ENT due to the length of time before he could see the specialist at .  ENT recommended NSAIDs as needed for inflammation and pain.  Also cautioned against chewing gum or chewing large bites.  Recommended soft diet.      Return in about 6 months (around 7/14/2025).    Please note portions of this note were completed with a voicerecognition program.  Efforts were made to edit the dictations but occasionally words are mis-transcribed.  
89 year old male with a medical history significant for HTN, prostate ca, s/p XRT, and recent cystoscopy 10/28/21 for a bladder tumor, presenting to the ED with hematuria, malaise, lethargy and altered mental status x 2 weeks, in renal failure with bilateral hydro, obstructive uropathy, gross hematuria, catheter placement and manual irrigation with removal of ~200cc of clot, and started on CBI, anemic, receiving transfusion.   
Mr. Aguilar is a 89 year old M with PMH of HTN, prostate cancer s/p XRT, and previous cystoscopy with bladder tumor removal on 10/28/2021 at [facility] presented with 2 week history of hematuria, malaise, and lethargy. He was found to be in acute renal failure 2/2 heavy clot burden with persistent hyponatremia and hyperkalemia requiring initiation of urgent dialysis via R IJ shiley. EP was called to evaluate as patient had two episodes SVT with HR up to 160s associated with hypotension. He received one dose of Amiodarone 150 mg IV. Currently not on any AV andrew blockers and is in sinus rhythm with HR 60s-70s.   - Continue to monitor on telemetry  - Maintain K+~4.0 and Mg++~2.0  - Check TFTs  - Start metoprolol 12.5 mg BID if BP tolerates  - Continue management as per primary team  - Will d/w Dr. Palacios  
Pt. with renal failure and hyperkalemia